# Patient Record
Sex: MALE | Race: WHITE | Employment: OTHER | URBAN - NONMETROPOLITAN AREA
[De-identification: names, ages, dates, MRNs, and addresses within clinical notes are randomized per-mention and may not be internally consistent; named-entity substitution may affect disease eponyms.]

---

## 2017-02-23 ENCOUNTER — OFFICE VISIT (OUTPATIENT)
Age: 82
End: 2017-02-23

## 2017-02-23 VITALS
RESPIRATION RATE: 20 BRPM | WEIGHT: 262 LBS | BODY MASS INDEX: 34.72 KG/M2 | OXYGEN SATURATION: 96 % | HEIGHT: 73 IN | SYSTOLIC BLOOD PRESSURE: 130 MMHG | HEART RATE: 84 BPM | DIASTOLIC BLOOD PRESSURE: 80 MMHG | TEMPERATURE: 97.8 F

## 2017-02-23 DIAGNOSIS — R19.05 PERIUMBILICAL MASS: Primary | ICD-10-CM

## 2017-02-23 PROCEDURE — 99214 OFFICE O/P EST MOD 30 MIN: CPT | Performed by: SURGERY

## 2017-02-23 RX ORDER — PANTOPRAZOLE SODIUM 40 MG/1
40 TABLET, DELAYED RELEASE ORAL DAILY
COMMUNITY

## 2017-02-23 ASSESSMENT — ENCOUNTER SYMPTOMS
ABDOMINAL PAIN: 1
RECTAL PAIN: 0
WHEEZING: 0
STRIDOR: 0
EYE DISCHARGE: 0
ABDOMINAL DISTENTION: 1
PHOTOPHOBIA: 0
CHOKING: 0
DIARRHEA: 0
TROUBLE SWALLOWING: 0
APNEA: 0
BLOOD IN STOOL: 0
FACIAL SWELLING: 0
SORE THROAT: 0
CHEST TIGHTNESS: 0
VOICE CHANGE: 0
COUGH: 0
NAUSEA: 0
RHINORRHEA: 0
COLOR CHANGE: 0
EYE ITCHING: 0
CONSTIPATION: 0
SINUS PRESSURE: 0
BACK PAIN: 0
EYE PAIN: 0
SHORTNESS OF BREATH: 0
VOMITING: 0
EYE REDNESS: 0
ANAL BLEEDING: 0

## 2021-07-07 ENCOUNTER — HOSPITAL ENCOUNTER (INPATIENT)
Age: 86
LOS: 6 days | Discharge: HOME HEALTH | DRG: 571 | End: 2021-07-13
Attending: EMERGENCY MEDICINE | Admitting: INTERNAL MEDICINE
Payer: MEDICARE

## 2021-07-07 DIAGNOSIS — L02.211 ABDOMINAL WALL ABSCESS: Primary | ICD-10-CM

## 2021-07-07 DIAGNOSIS — I10 ESSENTIAL HYPERTENSION: ICD-10-CM

## 2021-07-07 DIAGNOSIS — E87.1 HYPONATREMIA: ICD-10-CM

## 2021-07-07 LAB
ANION GAP SERPL CALCULATED.3IONS-SCNC: 13 MEQ/L (ref 8–16)
BASOPHILS # BLD: 0.5 %
BASOPHILS ABSOLUTE: 0.1 THOU/MM3 (ref 0–0.1)
BUN BLDV-MCNC: 11 MG/DL (ref 7–22)
CALCIUM SERPL-MCNC: 9.3 MG/DL (ref 8.5–10.5)
CHLORIDE BLD-SCNC: 89 MEQ/L (ref 98–111)
CO2: 22 MEQ/L (ref 23–33)
CREAT SERPL-MCNC: 0.8 MG/DL (ref 0.4–1.2)
CREATININE URINE: 19.2 MG/DL
EOSINOPHIL # BLD: 3.2 %
EOSINOPHILS ABSOLUTE: 0.4 THOU/MM3 (ref 0–0.4)
ERYTHROCYTE [DISTWIDTH] IN BLOOD BY AUTOMATED COUNT: 13.2 % (ref 11.5–14.5)
ERYTHROCYTE [DISTWIDTH] IN BLOOD BY AUTOMATED COUNT: 44 FL (ref 35–45)
GFR SERPL CREATININE-BSD FRML MDRD: > 90 ML/MIN/1.73M2
GLUCOSE BLD-MCNC: 103 MG/DL (ref 70–108)
HCT VFR BLD CALC: 30.3 % (ref 42–52)
HEMOGLOBIN: 10.4 GM/DL (ref 14–18)
IMMATURE GRANS (ABS): 0.09 THOU/MM3 (ref 0–0.07)
IMMATURE GRANULOCYTES: 0.7 %
LYMPHOCYTES # BLD: 4.3 %
LYMPHOCYTES ABSOLUTE: 0.5 THOU/MM3 (ref 1–4.8)
MCH RBC QN AUTO: 31.3 PG (ref 26–33)
MCHC RBC AUTO-ENTMCNC: 34.3 GM/DL (ref 32.2–35.5)
MCV RBC AUTO: 91.3 FL (ref 80–94)
MONOCYTES # BLD: 11 %
MONOCYTES ABSOLUTE: 1.4 THOU/MM3 (ref 0.4–1.3)
NUCLEATED RED BLOOD CELLS: 0 /100 WBC
OSMOLALITY: 266 MOSMOL/KG (ref 275–295)
PLATELET # BLD: 248 THOU/MM3 (ref 130–400)
PMV BLD AUTO: 10.7 FL (ref 9.4–12.4)
POTASSIUM REFLEX MAGNESIUM: 4.8 MEQ/L (ref 3.5–5.2)
PRO-BNP: 2378 PG/ML (ref 0–1800)
RBC # BLD: 3.32 MILL/MM3 (ref 4.7–6.1)
SEG NEUTROPHILS: 80.3 %
SEGMENTED NEUTROPHILS ABSOLUTE COUNT: 10.2 THOU/MM3 (ref 1.8–7.7)
SODIUM BLD-SCNC: 124 MEQ/L (ref 135–145)
SODIUM BLD-SCNC: 127 MEQ/L (ref 135–145)
SODIUM URINE: 31 MEQ/L
TSH SERPL DL<=0.05 MIU/L-ACNC: 1.54 UIU/ML (ref 0.4–4.2)
WBC # BLD: 12.7 THOU/MM3 (ref 4.8–10.8)

## 2021-07-07 PROCEDURE — 84300 ASSAY OF URINE SODIUM: CPT

## 2021-07-07 PROCEDURE — 85025 COMPLETE CBC W/AUTO DIFF WBC: CPT

## 2021-07-07 PROCEDURE — 87077 CULTURE AEROBIC IDENTIFY: CPT

## 2021-07-07 PROCEDURE — 87075 CULTR BACTERIA EXCEPT BLOOD: CPT

## 2021-07-07 PROCEDURE — 83880 ASSAY OF NATRIURETIC PEPTIDE: CPT

## 2021-07-07 PROCEDURE — 87186 SC STD MICRODIL/AGAR DIL: CPT

## 2021-07-07 PROCEDURE — 83930 ASSAY OF BLOOD OSMOLALITY: CPT

## 2021-07-07 PROCEDURE — 84295 ASSAY OF SERUM SODIUM: CPT

## 2021-07-07 PROCEDURE — 87205 SMEAR GRAM STAIN: CPT

## 2021-07-07 PROCEDURE — 1200000003 HC TELEMETRY R&B

## 2021-07-07 PROCEDURE — 10060 I&D ABSCESS SIMPLE/SINGLE: CPT

## 2021-07-07 PROCEDURE — 80048 BASIC METABOLIC PNL TOTAL CA: CPT

## 2021-07-07 PROCEDURE — 83935 ASSAY OF URINE OSMOLALITY: CPT

## 2021-07-07 PROCEDURE — 87070 CULTURE OTHR SPECIMN AEROBIC: CPT

## 2021-07-07 PROCEDURE — 99282 EMERGENCY DEPT VISIT SF MDM: CPT

## 2021-07-07 PROCEDURE — 36415 COLL VENOUS BLD VENIPUNCTURE: CPT

## 2021-07-07 PROCEDURE — 87147 CULTURE TYPE IMMUNOLOGIC: CPT

## 2021-07-07 PROCEDURE — 84443 ASSAY THYROID STIM HORMONE: CPT

## 2021-07-07 PROCEDURE — 82570 ASSAY OF URINE CREATININE: CPT

## 2021-07-07 PROCEDURE — 99203 OFFICE O/P NEW LOW 30 MIN: CPT | Performed by: EMERGENCY MEDICINE

## 2021-07-07 PROCEDURE — 0H97XZZ DRAINAGE OF ABDOMEN SKIN, EXTERNAL APPROACH: ICD-10-PCS | Performed by: STUDENT IN AN ORGANIZED HEALTH CARE EDUCATION/TRAINING PROGRAM

## 2021-07-07 PROCEDURE — 99205 OFFICE O/P NEW HI 60 MIN: CPT

## 2021-07-07 RX ORDER — LOSARTAN POTASSIUM 50 MG/1
50 TABLET ORAL NIGHTLY
Status: DISCONTINUED | OUTPATIENT
Start: 2021-07-07 | End: 2021-07-13 | Stop reason: HOSPADM

## 2021-07-07 RX ORDER — ONDANSETRON 2 MG/ML
4 INJECTION INTRAMUSCULAR; INTRAVENOUS EVERY 6 HOURS PRN
Status: DISCONTINUED | OUTPATIENT
Start: 2021-07-07 | End: 2021-07-13 | Stop reason: HOSPADM

## 2021-07-07 RX ORDER — DOCUSATE SODIUM 100 MG/1
100 CAPSULE, LIQUID FILLED ORAL DAILY
Status: DISCONTINUED | OUTPATIENT
Start: 2021-07-07 | End: 2021-07-09 | Stop reason: DRUGHIGH

## 2021-07-07 RX ORDER — DILTIAZEM HYDROCHLORIDE 120 MG/1
120 CAPSULE, COATED, EXTENDED RELEASE ORAL DAILY
Status: DISCONTINUED | OUTPATIENT
Start: 2021-07-08 | End: 2021-07-13 | Stop reason: HOSPADM

## 2021-07-07 RX ORDER — BUDESONIDE AND FORMOTEROL FUMARATE DIHYDRATE 160; 4.5 UG/1; UG/1
2 AEROSOL RESPIRATORY (INHALATION) 2 TIMES DAILY
Status: DISCONTINUED | OUTPATIENT
Start: 2021-07-07 | End: 2021-07-13 | Stop reason: HOSPADM

## 2021-07-07 RX ORDER — SODIUM CHLORIDE 9 MG/ML
1000 INJECTION, SOLUTION INTRAVENOUS CONTINUOUS
Status: DISCONTINUED | OUTPATIENT
Start: 2021-07-07 | End: 2021-07-07

## 2021-07-07 RX ORDER — ACETAMINOPHEN 325 MG/1
650 TABLET ORAL EVERY 4 HOURS PRN
Status: DISCONTINUED | OUTPATIENT
Start: 2021-07-07 | End: 2021-07-13 | Stop reason: HOSPADM

## 2021-07-07 RX ORDER — LATANOPROST 50 UG/ML
1 SOLUTION/ DROPS OPHTHALMIC NIGHTLY
Status: DISCONTINUED | OUTPATIENT
Start: 2021-07-07 | End: 2021-07-13 | Stop reason: HOSPADM

## 2021-07-07 RX ORDER — IPRATROPIUM BROMIDE AND ALBUTEROL SULFATE 2.5; .5 MG/3ML; MG/3ML
1 SOLUTION RESPIRATORY (INHALATION) EVERY 4 HOURS PRN
Status: DISCONTINUED | OUTPATIENT
Start: 2021-07-07 | End: 2021-07-13 | Stop reason: HOSPADM

## 2021-07-07 RX ORDER — DIGOXIN 250 MCG
125 TABLET ORAL DAILY
Status: DISCONTINUED | OUTPATIENT
Start: 2021-07-08 | End: 2021-07-13 | Stop reason: HOSPADM

## 2021-07-07 RX ORDER — ALBUTEROL SULFATE 2.5 MG/3ML
2.5 SOLUTION RESPIRATORY (INHALATION) EVERY 6 HOURS PRN
Status: DISCONTINUED | OUTPATIENT
Start: 2021-07-07 | End: 2021-07-13 | Stop reason: HOSPADM

## 2021-07-07 RX ORDER — CLONIDINE HYDROCHLORIDE 0.2 MG/1
0.2 TABLET ORAL 3 TIMES DAILY
Status: DISCONTINUED | OUTPATIENT
Start: 2021-07-07 | End: 2021-07-08

## 2021-07-07 RX ORDER — LIDOCAINE HYDROCHLORIDE AND EPINEPHRINE 10; 10 MG/ML; UG/ML
INJECTION, SOLUTION INFILTRATION; PERINEURAL
Status: DISPENSED
Start: 2021-07-07 | End: 2021-07-08

## 2021-07-07 RX ORDER — SODIUM CHLORIDE 9 MG/ML
INJECTION, SOLUTION INTRAVENOUS CONTINUOUS
Status: DISCONTINUED | OUTPATIENT
Start: 2021-07-07 | End: 2021-07-07

## 2021-07-07 ASSESSMENT — ENCOUNTER SYMPTOMS
EYE REDNESS: 0
SORE THROAT: 0
COLOR CHANGE: 1
VOICE CHANGE: 0
TROUBLE SWALLOWING: 0
COUGH: 0
EYE ITCHING: 0
PHOTOPHOBIA: 0
VOMITING: 0
DIARRHEA: 0
WHEEZING: 0
STRIDOR: 0
ABDOMINAL PAIN: 0
SINUS PRESSURE: 0
EYE DISCHARGE: 0
SHORTNESS OF BREATH: 0
BACK PAIN: 0
NAUSEA: 0
EYE PAIN: 0

## 2021-07-07 ASSESSMENT — VISUAL ACUITY: OU: 1

## 2021-07-07 ASSESSMENT — PAIN DESCRIPTION - LOCATION: LOCATION: ABDOMEN

## 2021-07-07 NOTE — ED NOTES
Pt up in bed with blankets over bottom half. Patient updated on plan of care at this time and expresses no concerns regarding treatment. Patient VSS. Respirations are regular and unlabored, patient is alert and oriented x4. Patient bed rails up x2 and call light within reach. Will continue to monitor.        Gale Gutierrez RN  07/07/21 3530

## 2021-07-07 NOTE — ED PROVIDER NOTES
[facility] ENCOUNTER          Pt Name: Jo Prescott  MRN: [id number]  [place]way[place] 1935  Date of evaluation: 7/7/2021  Treating Resident Physician: Mode Morton MD  Supervising Physician: Stephanie Blanca       Chief Complaint   Patient presents with    Abscess     left lower abd. Started as an infected hair or something.  Wound Infection     History obtained from the patient. HISTORY OF PRESENT ILLNESS    The history is provided by the patient. No  was used. Estelita Palmer is a 82 y.o. male who presents to the emergency department for evaluation of a wound in the left lower abdomen since one week ago that has progressively enlarged. He reports pain 3/10 in severity located around the wound, purulent drainage, swelling, and redness. He denies any other new rashes. Denies subjective fever, c/p, SOB, abd pain, N/V, night sweats. He denies history of prior Staph infection. He is taking Xarelto and reports that he may have bumped his abdomen on his walker recently prior to the abscess formation. The patient has no other acute complaints at this time. REVIEW OF SYSTEMS   Review of Systems   Constitutional: Negative for chills, diaphoresis and fever. HENT: Negative for sore throat. Eyes: Negative for photophobia, pain, itching and visual disturbance. Respiratory: Negative for cough and shortness of breath. Cardiovascular: Negative for chest pain and leg swelling. Gastrointestinal: Negative for nausea and vomiting. Genitourinary: Negative for decreased urine volume, difficulty urinating, dysuria, hematuria and urgency. Musculoskeletal: Negative for arthralgias and neck pain. Skin: Positive for rash and wound. Neurological: Negative for dizziness, seizures, facial asymmetry, light-headedness and headaches. Psychiatric/Behavioral: Negative for agitation and confusion.          PAST MEDICAL AND SURGICAL HISTORY     Past Medical History:   Diagnosis Date    Arthritis     Asthma     Atrial fibrillation ([facility] [address]     [facility]     COPD (chronic obstructive pulmonary disease) ([facility] [address]     Encephalopathy acute 11/20/2013    Hyperlipidemia     Hypertension     Muscular deconditioning 12/23/2013    Pneumonia      Past Surgical History:   Procedure Laterality Date    ABDOMEN SURGERY      EKG 12-LEAD  8/5/2015         FRACTURE SURGERY  unsure    HERNIA REPAIR  2013    [place]    OTHER SURGICAL HISTORY  10-30-13    abdominal exploration, closure of evisceration with retention sutures-Dr. Aide Self     OTHER SURGICAL HISTORY  10-25-13    Placement of gastrostomy tube-Dr. Aide Self          MEDICATIONS     Current Facility-Administered Medications:     lidocaine-EPINEPHrine 1 %-1:564577 injection, , , ,     Current Outpatient Medications:     pantoprazole (PROTONIX) 40 MG tablet, Take 40 mg by mouth daily, Disp: , Rfl:     SPIRIVA HANDIHALER 18 MCG inhalation capsule, INHALE THE CONTENTS OF ONE CAPSULE BY MOUTH ONCE DAILY AS DIRECTED, Disp: 30 capsule, Rfl: 5    docusate sodium (COLACE) 100 MG capsule, TAKE ONE (1) CAPSULE BY MOUTH TWICE DAILY, Disp: 60 capsule, Rfl: 5    XARELTO 20 MG TABS tablet, TAKE ONE (1) TABLET BY MOUTH EACH EVENING WITH MEAL, Disp: 30 tablet, Rfl: 5    albuterol sulfate HFA (PROAIR HFA) 108 (90 BASE) MCG/ACT inhaler, INHALE 2 PUFFS BY MOUTH EVERY 4 HOURS AS NEEDED, Disp: 1 Inhaler, Rfl: 5    LANOXIN 125 MCG tablet, Take 1 tablet by mouth daily, Disp: 30 tablet, Rfl: 5    losartan (COZAAR) 50 MG tablet, TAKE 1 TABLET BY MOUTH NIGHTLY, Disp: 90 tablet, Rfl: 11    traZODone (DESYREL) 150 MG tablet, TAKE (1) TABLET BY MOUTH NIGHTLY AS NEEDED FOR SLEEP, Disp: 30 tablet, Rfl: 5    fluticasone (FLONASE) 50 MCG/ACT nasal spray, INSTILL 1 SPRAY INTRANASALLY DAILY, Disp: 16 g, Rfl: 11    clotrimazole (LOTRIMIN) 1 % cream, APPLY TOPICALLY AS DIRECTED, Disp: 45 g, Rfl: 5    ciclopirox (LOPROX) 0.77 % cream, APPLY TOPICALLY 2 TIMES DAILY, Disp: 15 g, Rfl: 11    spironolactone (ALDACTONE) 25 MG tablet, TAKE 1 TABLET BY MOUTH ONCE DAILY, Disp: 30 tablet, Rfl: 5    cloNIDine (CATAPRES) 0.2 MG tablet, Take 1 tablet by mouth three times daily, Disp: 180 tablet, Rfl: 3    SYMBICORT 160-4.5 MCG/ACT AERO, INHALE 2 (TWO) PUFFS BY MOUTH TWICE DAILY, Disp: 10.2 g, Rfl: 5    ipratropium-albuterol (DUONEB) 0.5-2.5 (3) MG/3ML SOLN nebulizer solution, INHALE THE CONTENTS OF 1 VIAL VIA NEBULIZER EVERY 6 HOURS, Disp: 360 mL, Rfl: 2    diltiazem (CARDIZEM CD) 120 MG ER capsule, Take 1 capsule by mouth daily, Disp: 90 capsule, Rfl: 3    furosemide (LASIX) 20 MG tablet, Take 1 tablet by mouth daily, Disp: 90 tablet, Rfl: 3    losartan (COZAAR) 50 MG tablet, Take 1 tablet by mouth nightly, Disp: 90 tablet, Rfl: 3    metoprolol (LOPRESSOR) 25 MG tablet, Take 1 tablet by mouth every 12 hours, Disp: 180 tablet, Rfl: 3    FLUoxetine (PROZAC) 10 MG capsule, TAKE ONE (1) CAPSULE BY MOUTH ONCE DAILY, Disp: 30 capsule, Rfl: 11    Lift Chair MISC, by Does not apply route, Disp: 1 each, Rfl: 0    AMITIZA 24 MCG capsule, TAKE ONE (1) CAPSULE BY MOUTH ONCE DAILY WITH FOOD, Disp: 30 capsule, Rfl: 11    Multiple Vitamins-Minerals (CERTAVITE SENIOR/ANTIOXIDANT PO), Take by mouth, Disp: , Rfl:     polyvinyl alcohol (LIQUIFILM TEARS) 1.4 % ophthalmic solution, 1 drop as needed, Disp: , Rfl:     latanoprost (XALATAN) 0.005 % ophthalmic solution, 1 drop nightly, Disp: , Rfl:     magnesium hydroxide (MILK OF MAGNESIA CONCENTRATE) 2400 MG/10ML SUSP, Take 2,400 mg by mouth once as needed, Disp: , Rfl:     acetaminophen 650 MG TABS, Take 650 mg by mouth every 4 hours as needed, Disp: 120 tablet, Rfl: 3    albuterol (PROVENTIL) (2.5 MG/3ML) 0.083% nebulizer solution, Take 3 mLs by nebulization every 6 hours as needed for Wheezing, Disp: 1 Package, Rfl: 3    guaiFENesin (ROBITUSSIN) 100 MG/5ML syrup, Take 200 mg by mouth 4 times daily as needed for Cough or Congestion. , Disp: , Rfl:     Nutritional Supplements (ENSURE COMPLETE) LIQD, Take 1 Can by mouth daily. , Disp: , Rfl:       SOCIAL HISTORY     Social History     Social History Narrative    Not on file     Social History     Tobacco Use    Smoking status: Former Smoker     Packs/day: 2.00     Years: 30.00     Pack years: 60.00     Types: Cigarettes     Start date: 12/17/1978    Smokeless tobacco: Never Used   Substance Use Topics    Alcohol use: No     Comment: 1 time a week    Drug use: No         ALLERGIES     Allergies   Allergen Reactions    Metolazone      Causes hyponatremia (with high urine sodium)    Thiazide-Type Diuretics      Causes hyponatremia (with high urine sodium)         FAMILY HISTORY     Family History   Problem Relation Age of Onset    Arthritis Father     Asthma Father     Heart Disease Father     High Blood Pressure Father     Cancer Mother         ovarian cancer    Diabetes Mother     Cancer Sister     Heart Disease Sister     Diabetes Brother     Cancer Sister     Heart Disease Sister     Heart Disease Brother     Diabetes Brother     Heart Disease Brother          PREVIOUS RECORDS   Previous records reviewed. PHYSICAL EXAM     ED Triage Vitals [07/07/21 1200]   BP Temp Temp Source Pulse Resp SpO2 Height Weight   (!) 146/62 97.6 °F (36.4 °C) Temporal 69 16 97 % 6' 1\" (1.854 m) 230 lb (104.3 kg)     Initial vital signs and nursing assessment reviewed and normal. Body mass index is 30.34 kg/m². Pulsoximetry is normal per my interpretation. Additional Vital Signs:  Vitals:    07/07/21 1200   BP: (!) 146/62   Pulse: 69   Resp: 16   Temp: 97.6 °F (36.4 °C)   SpO2: 97%       Physical Exam  Exam conducted with a chaperone present. Constitutional:       General: He is awake. Appearance: Normal appearance. He is well-developed.  He is not ill-appearing, toxic-appearing or diaphoretic. HENT:      Head: Atraumatic. Eyes:      General: Lids are normal. Vision grossly intact. Gaze aligned appropriately. Extraocular Movements: Extraocular movements intact. Conjunctiva/sclera: Conjunctivae normal.      Pupils: Pupils are equal, round, and reactive to light. Neck:      Trachea: Trachea normal.   Cardiovascular:      Rate and Rhythm: Normal rate and regular rhythm. Heart sounds: Heart sounds are distant. No murmur heard. No friction rub. No gallop. Pulmonary:      Effort: Pulmonary effort is normal.      Breath sounds: Normal breath sounds and air entry. Chest:      Chest wall: No mass, lacerations, deformity, swelling, tenderness, crepitus or edema. Breasts: Breasts are symmetrical.     Abdominal:      General: Abdomen is flat. A surgical scar is present. There is no distension. Palpations: Abdomen is soft. Tenderness: There is no abdominal tenderness. There is no guarding or rebound. Genitourinary:     Penis: Normal.    Musculoskeletal:      Cervical back: Full passive range of motion without pain, normal range of motion and neck supple. Right lower leg: No edema. Left lower leg: No edema. Feet:      Right foot:      Skin integrity: Skin integrity normal.      Toenail Condition: Right toenails are normal.      Left foot:      Skin integrity: Skin integrity normal.      Toenail Condition: Left toenails are normal.   Skin:     General: Skin is warm and dry. Findings: Abscess and bruising present. Comments: Ecchymoses to b/l forearms   Neurological:      Mental Status: He is alert and oriented to person, place, and time. Mental status is at baseline. GCS: GCS eye subscore is 4. GCS verbal subscore is 5. GCS motor subscore is 6. Cranial Nerves: Cranial nerves are intact.    Psychiatric:         Attention and Perception: Attention and perception normal.         Mood and Affect: Mood and affect normal. Speech: Speech normal.         Behavior: Behavior normal. Behavior is cooperative. Thought Content: Thought content normal.         Incision/Drainage    Date/Time: 7/7/2021 2:48 PM  Performed by: Muriel Hodgkins, MD  Authorized by: Coleen Graham DO     Consent:     Consent obtained:  Verbal    Consent given by:  Patient    Risks discussed:  Bleeding and pain    Alternatives discussed:  No treatment  Location:     Type:  Abscess    Size:  4cm x 5cm    Location:  Trunk    Trunk location:  Abdomen  Pre-procedure details:     Skin preparation:  Antiseptic wash  Anesthesia (see MAR for exact dosages): Anesthesia method:  Local infiltration    Local anesthetic:  Lidocaine 1% WITH epi  Procedure type:     Complexity:  Complex  Procedure details:     Needle aspiration: no      Incision types:  Single straight    Incision depth:  Dermal    Wound management:  Irrigated with saline and probed and deloculated    Drainage:  Bloody and purulent    Drainage amount: Moderate    Packing materials:  1/2 in gauze  Post-procedure details:     Patient tolerance of procedure: Tolerated well, no immediate complications          MEDICAL DECISION MAKING   Initial Assessment:   1. Cutaneous abdominal abscess. Plan:    Afebrile   WBC 12.7   POCUS confirmed dx   I&D performed    2.  Hyponatremia  Plan  Stable, pt mentating appropriately  Na 124 today, last Na 133 in 2017  Home meds: Prozac, Lasix (probable cause of hyponatremia)  Will admit to Dr. Charlene Moya        ED RESULTS   Laboratory results:  Labs Reviewed   BASIC METABOLIC PANEL W/ REFLEX TO MG FOR LOW K - Abnormal; Notable for the following components:       Result Value    Sodium 124 (*)     Chloride 89 (*)     CO2 22 (*)     All other components within normal limits   CBC WITH AUTO DIFFERENTIAL - Abnormal; Notable for the following components:    WBC 12.7 (*)     RBC 3.32 (*)     Hemoglobin 10.4 (*)     Hematocrit 30.3 (*)     Segs Absolute 10.2 (*)     Lymphocytes Absolute 0.5 (*)     Monocytes Absolute 1.4 (*)     Immature Grans (Abs) 0.09 (*)     All other components within normal limits   CULTURE, ANAEROBIC AND AEROBIC   ANION GAP   GLOMERULAR FILTRATION RATE, ESTIMATED       Radiologic studies results:  No orders to display       ED Medications administered this visit:   Medications   lidocaine-EPINEPHrine 1 %-1:587597 injection (has no administration in time range)         ED COURSE     ED Course as of Jul 07 1610   Wed Jul 07, 2021   1528 BMP showed hyponatremia of 124. Will to admit to Dr. Arnaldo Sal. Discussed with Dr. Arnaldo Sal and he is in agreement. [JT]      ED Course User Index  [JT] Christiane Ireland MD             MEDICATION CHANGES     Current Discharge Medication List            FINAL DISPOSITION     Final diagnoses:   Abdominal wall abscess   Hyponatremia     Condition: condition: stable  Dispo: Admit to hospital      This transcription was electronically signed. Parts of this transcriptions may have been dictated by use of voice recognition software and electronically transcribed, and parts may have been transcribed with the assistance of an ED scribe. The transcription may contain errors not detected in proofreading. Please refer to my supervising physician's documentation if my documentation differs.     Electronically Signed: Christiane Ireland MD, 07/07/21, 4:10 PM          Christiane Ireland MD  Resident  07/07/21 1612       Christiane Ireland MD  Resident  07/07/21 1731       Christiane Ireland MD  Resident  07/07/21 2403

## 2021-07-07 NOTE — ED NOTES
To [facility] with complaints of wanting a wound check. States it started about a week ago as an ingrown hair. States it has gotten worse over the past week. States that it is draining and redness is spreading. Large area of purple/redness with drainage from area. With large area of redness around area. Is painful.       Jean Pierre Rollins RN  07/07/21 6375

## 2021-07-07 NOTE — ED NOTES
Dr. Grimaldo Converse and student at the bedside performing US of abscess. Patient VSS. Respirations are regular and unlabored, patient is alert and oriented x4. Patient bed rails up x2 and call light within reach. Will continue to monitor.        Carmina Marcus RN  07/07/21 6944

## 2021-07-07 NOTE — ED PROVIDER NOTES
[id number]       Chief Complaint   Patient presents with    Abscess     left lower abd. Started as an infected hair or something.  Wound Infection       Nurses Notes reviewed and I agree except as noted in the HPI. HISTORY OF PRESENT ILLNESS   Dahlia Prescott is a 82 y.o. male who presents with 1 week history of redness pain swelling and purulent drainage from abscess of left lower abdominal wall. He rates pain at 4-10 in severity. No fever, vomiting, chest pain, shortness of breath, dizziness, syncope. No history of diabetes or MRSA. History of CHF, COPD, atrial fib, hypertension. REVIEW OF SYSTEMS     Review of Systems   Constitutional: Negative for appetite change, chills, fatigue, fever and unexpected weight change. HENT: Negative for congestion, ear discharge, ear pain, sinus pressure, sneezing, sore throat, trouble swallowing and voice change. Eyes: Negative for pain, discharge and redness. Respiratory: Negative for cough, shortness of breath, wheezing and stridor. Cardiovascular: Negative for chest pain and leg swelling. Gastrointestinal: Negative for abdominal pain, diarrhea, nausea and vomiting. Genitourinary: Negative for dysuria, frequency, hematuria and urgency. Musculoskeletal: Negative for arthralgias, back pain, myalgias and neck pain. Skin: Positive for color change. Negative for rash. Redness pain and swelling left lower abdominal wall   Neurological: Negative for dizziness, syncope, weakness and headaches. Hematological: Negative for adenopathy. Psychiatric/Behavioral: Negative for behavioral problems, confusion, sleep disturbance and suicidal ideas. The patient is not nervous/anxious. All other systems reviewed and are negative.       PAST MEDICAL HISTORY         Diagnosis Date    Arthritis     Asthma     Atrial fibrillation (HCC)     CHF (congestive heart failure) (HCC)     COPD (chronic obstructive pulmonary disease) ([facility] [address]     Encephalopathy acute 11/20/2013    Hyperlipidemia     Hypertension     Muscular deconditioning 12/23/2013    Pneumonia        SURGICAL HISTORY     Patient  has a past surgical history that includes fracture surgery (unsure); hernia repair (2013); other surgical history (10-30-13); other surgical history (10-25-13); Abdomen surgery; and EKG 12 Lead (8/5/2015). CURRENT MEDICATIONS       Previous Medications    ACETAMINOPHEN 650 MG TABS    Take 650 mg by mouth every 4 hours as needed    ALBUTEROL (PROVENTIL) (2.5 MG/3ML) 0.083% NEBULIZER SOLUTION    Take 3 mLs by nebulization every 6 hours as needed for Wheezing    ALBUTEROL SULFATE HFA (PROAIR HFA) 108 (90 BASE) MCG/ACT INHALER    INHALE 2 PUFFS BY MOUTH EVERY 4 HOURS AS NEEDED    AMITIZA 24 MCG CAPSULE    TAKE ONE (1) CAPSULE BY MOUTH ONCE DAILY WITH FOOD    CICLOPIROX (LOPROX) 0.77 % CREAM    APPLY TOPICALLY 2 TIMES DAILY    CLONIDINE (CATAPRES) 0.2 MG TABLET    Take 1 tablet by mouth three times daily    CLOTRIMAZOLE (LOTRIMIN) 1 % CREAM    APPLY TOPICALLY AS DIRECTED    DILTIAZEM (CARDIZEM CD) 120 MG ER CAPSULE    Take 1 capsule by mouth daily    DOCUSATE SODIUM (COLACE) 100 MG CAPSULE    TAKE ONE (1) CAPSULE BY MOUTH TWICE DAILY    FLUOXETINE (PROZAC) 10 MG CAPSULE    TAKE ONE (1) CAPSULE BY MOUTH ONCE DAILY    FLUTICASONE (FLONASE) 50 MCG/ACT NASAL SPRAY    INSTILL 1 SPRAY INTRANASALLY DAILY    FUROSEMIDE (LASIX) 20 MG TABLET    Take 1 tablet by mouth daily    GUAIFENESIN (ROBITUSSIN) 100 MG/5ML SYRUP    Take 200 mg by mouth 4 times daily as needed for Cough or Congestion.     IPRATROPIUM-ALBUTEROL (DUONEB) 0.5-2.5 (3) MG/3ML SOLN NEBULIZER SOLUTION    INHALE THE CONTENTS OF 1 VIAL VIA NEBULIZER EVERY 6 HOURS    LANOXIN 125 MCG TABLET    Take 1 tablet by mouth daily    LATANOPROST (XALATAN) 0.005 % OPHTHALMIC SOLUTION    1 drop nightly    LIFT CHAIR MISC    by Does not apply route    LOSARTAN (COZAAR) 50 MG TABLET    Take 1 tablet by mouth nightly    LOSARTAN (COZAAR) 50 MG TABLET    TAKE 1 TABLET BY MOUTH NIGHTLY    MAGNESIUM HYDROXIDE (MILK OF MAGNESIA CONCENTRATE) 2400 MG/10ML SUSP    Take 2,400 mg by mouth once as needed    METOPROLOL (LOPRESSOR) 25 MG TABLET    Take 1 tablet by mouth every 12 hours    MULTIPLE VITAMINS-MINERALS (CERTAVITE SENIOR/ANTIOXIDANT PO)    Take by mouth    NUTRITIONAL SUPPLEMENTS (ENSURE COMPLETE) LIQD    Take 1 Can by mouth daily. PANTOPRAZOLE (PROTONIX) 40 MG TABLET    Take 40 mg by mouth daily    POLYVINYL ALCOHOL (LIQUIFILM TEARS) 1.4 % OPHTHALMIC SOLUTION    1 drop as needed    SPIRIVA HANDIHALER 18 MCG INHALATION CAPSULE    INHALE THE CONTENTS OF ONE CAPSULE BY MOUTH ONCE DAILY AS DIRECTED    SPIRONOLACTONE (ALDACTONE) 25 MG TABLET    TAKE 1 TABLET BY MOUTH ONCE DAILY    SYMBICORT 160-4.5 MCG/ACT AERO    INHALE 2 (TWO) PUFFS BY MOUTH TWICE DAILY    TRAZODONE (DESYREL) 150 MG TABLET    TAKE (1) TABLET BY MOUTH NIGHTLY AS NEEDED FOR SLEEP    XARELTO 20 MG TABS TABLET    TAKE ONE (1) TABLET BY MOUTH EACH EVENING WITH MEAL       ALLERGIES     Patient is is allergic to metolazone and thiazide-type diuretics. FAMILY HISTORY     Patient'sfamily history includes Arthritis in his father; Asthma in his father; Cancer in his mother, sister, and sister; Diabetes in his brother, brother, and mother; Heart Disease in his brother, brother, father, sister, and sister; High Blood Pressure in his father. SOCIAL HISTORY     Patient  reports that he has quit smoking. His smoking use included cigarettes. He started smoking about 42 years ago. He has a 60.00 pack-year smoking history. He has never used smokeless tobacco. He reports that he does not drink alcohol and does not use drugs. PHYSICAL EXAM     ED TRIAGE VITALS  BP: (!) 146/62, Temp: 97.6 °F (36.4 °C), Pulse: 69, Resp: 16, SpO2: 97 %  Physical Exam  Vitals and nursing note reviewed.    Constitutional:       Appearance: He is well-developed. Comments: Moist membranes, normal airway   HENT:      Head: Normocephalic and atraumatic. Right Ear: External ear normal.      Left Ear: External ear normal.      Nose: Nose normal.      Mouth/Throat:      Pharynx: No oropharyngeal exudate. Comments: Oropharynx normal  Eyes:      General: No scleral icterus. Right eye: No discharge. Left eye: No discharge. Extraocular Movements:      Right eye: Normal extraocular motion. Left eye: Normal extraocular motion. Conjunctiva/sclera: Conjunctivae normal.      Pupils: Pupils are equal, round, and reactive to light. Comments: Conjunctiva clear   Neck:      Thyroid: No thyromegaly. Vascular: No JVD. Comments: No meningismus  Cardiovascular:      Rate and Rhythm: Normal rate and regular rhythm. Pulses: Normal pulses. Heart sounds: Normal heart sounds, S1 normal and S2 normal. No murmur heard. No friction rub. No gallop. Pulmonary:      Effort: Pulmonary effort is normal. No tachypnea or respiratory distress. Breath sounds: Normal breath sounds. No stridor. No decreased breath sounds, wheezing, rhonchi or rales. Comments: Sounds equal  Chest:      Chest wall: No tenderness. Abdominal:      General: Bowel sounds are normal. There is no distension. Palpations: Abdomen is soft. There is no mass. Tenderness: There is no abdominal tenderness. There is no guarding or rebound. Comments: Extensive erythema, tenderness, swelling left lower abdominal wall with open ulcer draining purulent material   Musculoskeletal:         General: No tenderness. Normal range of motion. Cervical back: Normal range of motion. Right lower leg: Normal.      Left lower leg: Normal.   Lymphadenopathy:      Cervical: Cervical adenopathy present. Right cervical: Superficial cervical adenopathy present. No deep cervical adenopathy.      Left cervical: Superficial cervical adenopathy present. No deep cervical adenopathy. Skin:     General: Skin is warm and dry. Findings: No erythema or rash. Comments: Extensive cellulitis and abscess left lower abdominal wall   Neurological:      Mental Status: He is alert and oriented to person, place, and time. Cranial Nerves: No cranial nerve deficit. Motor: No abnormal muscle tone. Coordination: Coordination normal.      Deep Tendon Reflexes: Reflexes are normal and symmetric. Reflexes normal.      Comments: Appropriate, no focal finding   Psychiatric:         Behavior: Behavior normal.         Thought Content: Thought content normal.         Judgment: Judgment normal.         DIAGNOSTIC RESULTS   Labs: No results found for this visit on 07/07/21. IMAGING:  No orders to display     URGENT CARE COURSE:     Vitals:    07/07/21 1200   BP: (!) 146/62   Pulse: 69   Resp: 16   Temp: 97.6 °F (36.4 °C)   TempSrc: Temporal   SpO2: 97%   Weight: 230 lb (104.3 kg)   Height: 6' 1\" (1.854 m)       Medications - No data to display  PROCEDURES:  None  FINALIMPRESSION      1. Abdominal wall abscess    2. Abdominal wall cellulitis        DISPOSITION/PLAN   DISPOSITION Decision To Transfer 07/07/2021 12:21:21 PM  Patient transferred to [facility] ED per patient and caregiver request.  Patient stable for private vehicle transfer with caregiver to drive.   Patient accepted in transfer by Ginny Calzada [facility] ED charge nurse at [facility]:  to [facility] ED      to [facility] ED    DISCHARGE MEDICATIONS:  New Prescriptions    No medications on file     Current Discharge Medication List          MD Kiran Gonzalez MD  07/07/21 [id number]

## 2021-07-07 NOTE — ED NOTES
Patient lying in bed with blankets watching tv at this time. Patient respirations are regular and unlabored. Patient appears to be in no current distress. Patient VSS. Call light is within reach. Patient expresses no needs at this time.        Eloy Sanchez RN  07/07/21 8875

## 2021-07-07 NOTE — ED NOTES
Patient expresses concerns regarding admission at this time. Dr. Bhavna Smith notified of concerns. Patient VSS. Respirations are regular and unlabored, patient is alert and oriented x4. Patient bed rails up x2 and call light within reach. Will continue to monitor.      Jamar Rabago RN  07/07/21 4720

## 2021-07-07 NOTE — ACP (ADVANCE CARE PLANNING)
Advance Care Planning     Advance Care Planning Activator (Inpatient)  Conversation Note      Date of ACP Conversation: 7/7/2021     Conversation Conducted with: Patient with Decision Making Capacity    ACP Activator: Hector Wilson RN    Care Preferences    Ventilation: \"If you were in your present state of health and suddenly became very ill and were unable to breathe on your own, what would your preference be about the use of a ventilator (breathing machine) if it were available to you? \"      Would the patient desire the use of ventilator (breathing machine)?: no    \"If your health worsens and it becomes clear that your chance of recovery is unlikely, what would your preference be about the use of a ventilator (breathing machine) if it were available to you? \"     Would the patient desire the use of ventilator (breathing machine)?: No      Resuscitation  \"CPR works best to restart the heart when there is a sudden event, like a heart attack, in someone who is otherwise healthy. Unfortunately, CPR does not typically restart the heart for people who have serious health conditions or who are very sick. \"    \"In the event your heart stopped as a result of an underlying serious health condition, would you want attempts to be made to restart your heart (answer \"yes\" for attempt to resuscitate) or would you prefer a natural death (answer \"no\" for do not attempt to resuscitate)? \" no       [x] Yes   [] No   Educated Patient / Kiesha Calix regarding differences between Advance Directives and portable DNR orders.     Length of ACP Conversation in minutes:  25  Conversation Outcomes:  [x] ACP discussion completed  [] Existing advance directive reviewed with patient; no changes to patient's previously recorded wishes  [] New Advance Directive completed  [] Portable Do Not Rescitate prepared for Provider review and signature  [] POLST/POST/MOLST/MOST prepared for Provider review and signature      Follow-up plan:    [] Schedule follow-up conversation to continue planning  [] Referred individual to Provider for additional questions/concerns   [] Advised patient/agent/surrogate to review completed ACP document and update if needed with changes in condition, patient preferences or care setting    [x] This note routed to one or more involved healthcare providers     Britton Tan is in room with his son Pancho Carlos. [facility]. Pt also hyponatremic on this admission. Pt is from [facility], states his wife lives on the other side at [facility]. Pt has a Living Will on file from [address] [place] [address], with son Pancho Carlos as his first contact. I educated both Britton Tan and Pancho Carlos on the difference between a Living Will & an Advanced Directive. Britton Tan states he is fine with keeping his Living Will, & does not feel like he needs to fill out an AD. Pt did state that he is a [facility], and has the portable paper hanging on his apartment door. Son Pancho Carlos also confirms this. I called admitting physician Dr. Karyn Babcock, and received a telephone order for [facility] here in the hospital. Educated to bring copy back to hospital should he return so we file it in chart here also. No other needs identified.

## 2021-07-08 LAB
ANION GAP SERPL CALCULATED.3IONS-SCNC: 9 MEQ/L (ref 8–16)
BUN BLDV-MCNC: 10 MG/DL (ref 7–22)
CALCIUM SERPL-MCNC: 9.1 MG/DL (ref 8.5–10.5)
CHLORIDE BLD-SCNC: 93 MEQ/L (ref 98–111)
CO2: 26 MEQ/L (ref 23–33)
CREAT SERPL-MCNC: 0.8 MG/DL (ref 0.4–1.2)
ERYTHROCYTE [DISTWIDTH] IN BLOOD BY AUTOMATED COUNT: 13.2 % (ref 11.5–14.5)
ERYTHROCYTE [DISTWIDTH] IN BLOOD BY AUTOMATED COUNT: 44.1 FL (ref 35–45)
GFR SERPL CREATININE-BSD FRML MDRD: > 90 ML/MIN/1.73M2
GLUCOSE BLD-MCNC: 134 MG/DL (ref 70–108)
HCT VFR BLD CALC: 29.6 % (ref 42–52)
HEMOGLOBIN: 10.1 GM/DL (ref 14–18)
MCH RBC QN AUTO: 31.3 PG (ref 26–33)
MCHC RBC AUTO-ENTMCNC: 34.1 GM/DL (ref 32.2–35.5)
MCV RBC AUTO: 91.6 FL (ref 80–94)
OSMOLALITY URINE: 136 MOSMOL/KG (ref 250–750)
PLATELET # BLD: 236 THOU/MM3 (ref 130–400)
PMV BLD AUTO: 9.9 FL (ref 9.4–12.4)
POTASSIUM SERPL-SCNC: 4.4 MEQ/L (ref 3.5–5.2)
RBC # BLD: 3.23 MILL/MM3 (ref 4.7–6.1)
SODIUM BLD-SCNC: 128 MEQ/L (ref 135–145)
WBC # BLD: 11.3 THOU/MM3 (ref 4.8–10.8)

## 2021-07-08 PROCEDURE — 2580000003 HC RX 258: Performed by: INTERNAL MEDICINE

## 2021-07-08 PROCEDURE — 6360000002 HC RX W HCPCS: Performed by: INTERNAL MEDICINE

## 2021-07-08 PROCEDURE — 6370000000 HC RX 637 (ALT 250 FOR IP): Performed by: INTERNAL MEDICINE

## 2021-07-08 PROCEDURE — 85027 COMPLETE CBC AUTOMATED: CPT

## 2021-07-08 PROCEDURE — 80048 BASIC METABOLIC PNL TOTAL CA: CPT

## 2021-07-08 PROCEDURE — 36415 COLL VENOUS BLD VENIPUNCTURE: CPT

## 2021-07-08 PROCEDURE — 94640 AIRWAY INHALATION TREATMENT: CPT

## 2021-07-08 PROCEDURE — 1200000003 HC TELEMETRY R&B

## 2021-07-08 PROCEDURE — 94760 N-INVAS EAR/PLS OXIMETRY 1: CPT

## 2021-07-08 RX ORDER — TRIAMCINOLONE ACETONIDE 1 MG/G
1 CREAM TOPICAL EVERY 12 HOURS PRN
COMMUNITY

## 2021-07-08 RX ORDER — CANDESARTAN 16 MG/1
16 TABLET ORAL 2 TIMES DAILY
COMMUNITY

## 2021-07-08 RX ORDER — UBIDECARENONE 75 MG
1000 CAPSULE ORAL DAILY
COMMUNITY

## 2021-07-08 RX ORDER — POLYETHYLENE GLYCOL 3350 17 G/17G
17 POWDER, FOR SOLUTION ORAL DAILY PRN
Status: ON HOLD | COMMUNITY
End: 2022-03-08 | Stop reason: HOSPADM

## 2021-07-08 RX ORDER — CLONIDINE HYDROCHLORIDE 0.2 MG/1
0.2 TABLET ORAL 2 TIMES DAILY
Status: DISCONTINUED | OUTPATIENT
Start: 2021-07-08 | End: 2021-07-13 | Stop reason: HOSPADM

## 2021-07-08 RX ORDER — MULTIVIT-MIN/IRON/FOLIC ACID/K 18-600-40
2000 CAPSULE ORAL DAILY
COMMUNITY

## 2021-07-08 RX ORDER — PANTOPRAZOLE SODIUM 40 MG/1
40 TABLET, DELAYED RELEASE ORAL DAILY
Status: DISCONTINUED | OUTPATIENT
Start: 2021-07-08 | End: 2021-07-13 | Stop reason: HOSPADM

## 2021-07-08 RX ORDER — METOPROLOL TARTRATE 50 MG/1
50 TABLET, FILM COATED ORAL EVERY 12 HOURS
Status: DISCONTINUED | OUTPATIENT
Start: 2021-07-08 | End: 2021-07-13 | Stop reason: HOSPADM

## 2021-07-08 RX ORDER — DOCUSATE SODIUM 50 MG/5ML
50 LIQUID ORAL EVERY 8 HOURS PRN
Status: DISCONTINUED | OUTPATIENT
Start: 2021-07-08 | End: 2021-07-13 | Stop reason: HOSPADM

## 2021-07-08 RX ADMIN — ACETAMINOPHEN 650 MG: 325 TABLET ORAL at 18:50

## 2021-07-08 RX ADMIN — LATANOPROST 1 DROP: 50 SOLUTION OPHTHALMIC at 00:49

## 2021-07-08 RX ADMIN — BUDESONIDE AND FORMOTEROL FUMARATE DIHYDRATE 2 PUFF: 160; 4.5 AEROSOL RESPIRATORY (INHALATION) at 07:27

## 2021-07-08 RX ADMIN — DOCUSATE SODIUM 100 MG: 100 CAPSULE, LIQUID FILLED ORAL at 09:57

## 2021-07-08 RX ADMIN — CLONIDINE HYDROCHLORIDE 0.2 MG: 0.2 TABLET ORAL at 21:46

## 2021-07-08 RX ADMIN — DILTIAZEM HYDROCHLORIDE 120 MG: 120 CAPSULE, EXTENDED RELEASE ORAL at 00:49

## 2021-07-08 RX ADMIN — CEFTRIAXONE SODIUM 1000 MG: 1 INJECTION, POWDER, FOR SOLUTION INTRAMUSCULAR; INTRAVENOUS at 02:09

## 2021-07-08 RX ADMIN — PANTOPRAZOLE SODIUM 40 MG: 40 TABLET, DELAYED RELEASE ORAL at 21:46

## 2021-07-08 RX ADMIN — BUDESONIDE AND FORMOTEROL FUMARATE DIHYDRATE 2 PUFF: 160; 4.5 AEROSOL RESPIRATORY (INHALATION) at 17:37

## 2021-07-08 RX ADMIN — MAGNESIUM HYDROXIDE 30 ML: 2400 SUSPENSION ORAL at 21:48

## 2021-07-08 RX ADMIN — RIVAROXABAN 20 MG: 20 TABLET, FILM COATED ORAL at 18:50

## 2021-07-08 RX ADMIN — METOPROLOL TARTRATE 50 MG: 50 TABLET, FILM COATED ORAL at 21:46

## 2021-07-08 RX ADMIN — DOCUSATE SODIUM 100 MG: 100 CAPSULE, LIQUID FILLED ORAL at 00:52

## 2021-07-08 RX ADMIN — CLONIDINE HYDROCHLORIDE 0.2 MG: 0.2 TABLET ORAL at 09:57

## 2021-07-08 RX ADMIN — CLONIDINE HYDROCHLORIDE 0.2 MG: 0.2 TABLET ORAL at 00:49

## 2021-07-08 RX ADMIN — LOSARTAN POTASSIUM 50 MG: 50 TABLET, FILM COATED ORAL at 21:46

## 2021-07-08 RX ADMIN — LATANOPROST 1 DROP: 50 SOLUTION OPHTHALMIC at 21:42

## 2021-07-08 RX ADMIN — CLONIDINE HYDROCHLORIDE 0.2 MG: 0.2 TABLET ORAL at 14:04

## 2021-07-08 RX ADMIN — LOSARTAN POTASSIUM 50 MG: 50 TABLET, FILM COATED ORAL at 00:49

## 2021-07-08 RX ADMIN — DIGOXIN 125 MCG: 250 TABLET ORAL at 09:58

## 2021-07-08 RX ADMIN — RIVAROXABAN 20 MG: 20 TABLET, FILM COATED ORAL at 02:09

## 2021-07-08 RX ADMIN — CEFTRIAXONE SODIUM 1000 MG: 1 INJECTION, POWDER, FOR SOLUTION INTRAMUSCULAR; INTRAVENOUS at 18:50

## 2021-07-08 ASSESSMENT — PAIN SCALES - GENERAL
PAINLEVEL_OUTOF10: 0
PAINLEVEL_OUTOF10: 4

## 2021-07-08 NOTE — H&P
Internal Medicine  History and Physical    Patient:  Enmanuel Prescott  MRN: [id number]      History Obtained From:  patient  PCP: Cloton Mccrary MD    CHIEF COMPLAINT:  abd pain/ wound x 1 week    HISTORY OF PRESENT ILLNESS:   The patient is a 82 y.o. male who presents with with a painful swelling in the left upper abdomen. Lesion started as a small pimple about a week ago. Subsequently got worse. No associated fever or chills. With increasing abdominal pain he came to emergency room last night. ER evaluation showed abscess involving the left upper abdominal wall. I&D performed. Started on antibiotics. Was also found to be hyponatremic with sodium of 124. Denies dizziness. No nausea no vomiting. Past Medical History:        Diagnosis Date    Arthritis     Asthma     [facility]rial fibrillation ([facility] [address]     CHF (congestive heart failure) (HCC)     [facility]) [facility])     Encephalopathy acute 11/20/2013    Hyperlipidemia     Hypertension     Muscular deconditioning 12/23/2013    Pneumonia        Past Surgical History:        Procedure Laterality Date    ABDOMEN SURGERY      EKG 12-LEAD  8/5/2015         FRACTURE SURGERY  unsure    [facility]ERN[facility]PAIR  2013    [facility]    OTHER SURGICAL HISTORY  10-30-13    abdominal exploration, closure of evisceration with retention sutures-Dr. Trevor Ray     OTHER SURGICAL HISTORY  10-25-13    Placement of gastrostomy tube-Dr. Trevor Ray        Medications Prior to Admission:    Prior to Admission medications    Medication Sig Start Date End Date Taking?  Authorizing Provider   Marco A Tolentino 18 MCG inhalation capsule INHALE THE CONTENTS OF ONE CAPSULE BY MOUTH ONCE DAILY AS DIRECTED 12/15/16  Yes Antoinette Hodgkins, APRN - CNP   docusate sodium (COLACE) 100 MG capsule TAKE ONE (1) CAPSULE BY MOUTH TWICE DAILY  Patient taking differently: Take 50 mg by mouth every 8 hours as needed for Constipation  10/20/16  Yes [facility] Rosy Clrak MD   XARELTO 20 MG TABS tablet TAKE ONE (1) TABLET BY MOUTH EACH EVENING WITH MEAL 10/20/16  Yes Pb Wren MD   albuterol sulfate HFA (PROAIR HFA) 108 (90 BASE) MCG/ACT inhaler INHALE 2 PUFFS BY MOUTH EVERY 4 HOURS AS NEEDED 8/30/16  Yes TREVA Woodward CNP   LANOXIN 125 MCG tablet Take 1 tablet by mouth daily 8/30/16  Yes TREVA Woodward CNP   traZODone (DESYREL) 150 MG tablet TAKE (1) TABLET BY MOUTH NIGHTLY AS NEEDED FOR SLEEP  Patient taking differently: Take 150 mg by mouth nightly Indications: Depression  7/27/16  Yes Pb Wren MD   fluticasone Lisseth Fillers) 50 MCG/ACT nasal spray INSTILL 1 SPRAY INTRANASALLY DAILY  Patient taking differently: 2 times daily  7/26/16  Yes Pb Wren MD   clotrimazole (LOTRIMIN) 1 % cream APPLY TOPICALLY AS DIRECTED 7/20/16  Yes Pb Wren MD   spironolactone (ALDACTONE) 25 MG tablet TAKE 1 TABLET BY MOUTH ONCE DAILY 4/19/16  Yes Pb Wren MD   cloNIDine (CATAPRES) 0.2 MG tablet Take 1 tablet by mouth three times daily  Patient taking differently: Take 0.2 mg by mouth 2 times daily  4/19/16  Yes Pb Wren MD   SYMBICORT 160-4.5 MCG/ACT AERO INHALE 2 (TWO) PUFFS BY MOUTH TWICE DAILY 3/1/16  Yes Thom Montilla MD   diltiazem (CARDIZEM CD) 120 MG ER capsule Take 1 capsule by mouth daily 1/19/16  Yes Pb Wren MD   furosemide (LASIX) 20 MG tablet Take 1 tablet by mouth daily 1/19/16  Yes Pb Wren MD   metoprolol (LOPRESSOR) 25 MG tablet Take 1 tablet by mouth every 12 hours  Patient taking differently: Take 50 mg by mouth every 12 hours  1/19/16  Yes Pb Wren MD   FLUoxetine (PROZAC) 10 MG capsule TAKE ONE (1) CAPSULE BY MOUTH ONCE DAILY 1/18/16  Yes Pb Wren MD   AMITIZA 24 MCG capsule TAKE ONE (1) CAPSULE BY MOUTH ONCE DAILY WITH FOOD 1/7/16  Yes Queen Keas Fanta Barber MD   Multiple Vitamins-Minerals (CERTAVITE SENIOR/ANTIOXIDANT PO) Take by mouth   Yes Historical Provider, MD   polyvinyl alcohol (LIQUIFILM TEARS) 1.4 % ophthalmic solution 1 drop as needed   Yes Historical Provider, MD   latanoprost (XALATAN) 0.005 % ophthalmic solution 1 drop nightly   Yes Historical Provider, MD   magnesium hydroxide (MILK OF MAGNESIA CONCENTRATE) 2400 MG/10ML SUSP Take 2,400 mg by mouth once as needed   Yes Historical Provider, MD   acetaminophen 650 MG TABS Take 650 mg by mouth every 4 hours as needed 7/5/15  Yes Laura Pablo MD   albuterol (PROVENTIL) (2.5 MG/3ML) 0.083% nebulizer solution Take 3 mLs by nebulization every 6 hours as needed for Wheezing  Patient taking differently: Take 2.5 mg by nebulization 2 times daily  7/5/15  Yes Laura Pablo MD   guaiFENesin (ROBITUSSIN) 100 MG/5ML syrup Take 200 mg by mouth 4 times daily as needed for Cough or Congestion. Yes Historical Provider, MD   pantoprazole (PROTONIX) 40 MG tablet Take 40 mg by mouth daily    Historical Provider, MD   Lift Chair MISC by Does not apply route 1/11/16   Adan Monaco MD   Nutritional Supplements (ENSURE COMPLETE) LIQD Take 1 Can by mouth daily. Historical Provider, MD       Allergies:  Metolazone and Thiazide-type diuretics    Social History:   TOBACCO:   reports that he has quit smoking. His smoking use included cigarettes. He started smoking about 42 years ago. He has a 60.00 pack-year smoking history. He has never used smokeless tobacco.  ETOH:   reports no history of alcohol use.       Family History:       Problem Relation Age of Onset    Arthritis Father     Asthma Father     Heart Disease Father     High Blood Pressure Father     Cancer Mother         ovarian cancer    Diabetes Mother     Cancer Sister     Heart Disease Sister     Diabetes Brother     Cancer Sister     Heart Disease Sister     Heart Disease Brother     Diabetes Brother     Heart Disease Brother REVIEW OF SYSTEMS:  CONSTITUTIONAL:  positive for  fatigue and malaise  negative for  fevers and chills  EYES:  negative for  irritation, redness and icterus  HEENT:  negative for  sore mouth, sore throat and hoarseness  RESPIRATORY:  negative for  dry cough, dyspnea and wheezing  CARDIOVASCULAR:  negative for  chest pain, dyspnea, palpitations, orthopnea  GASTROINTESTINAL:  positive for abdominal pain  negative for nausea, vomiting, abdominal mass and abdominal distention  GENITOURINARY:  negative for frequency and dysuria  HEMATOLOGIC/LYMPHATIC:  negative for easy bruising, bleeding and lymphadenopathy  ENDOCRINE:  negative for heat intolerance and cold intolerance  MUSCULOSKELETAL:  negative for  myalgias and arthralgias  NEUROLOGICAL:  positive for weakness  negative for headaches, dizziness and seizures  BEHAVIOR/PSYCH:  negative for increased agitation and anxiety    Physical Exam:    Vitals: /77   Pulse 65   Temp 97.8 °F (36.6 °C) (Oral)   Resp 20   Ht 6' 1\" (1.854 m)   Wt 233 lb 7.5 oz (105.9 kg)   SpO2 96%   BMI 30.80 kg/m²   CONSTITUTIONAL:  awake, alert, cooperative, no apparent distress, and appears stated age  EYES:  extra-ocular muscles intact  ENT:  normocepalic, without obvious abnormality  NECK:  supple, symmetrical, trachea midline  BACK:  symmetric  LUNGS:  No increased work of breathing, good air exchange, clear to auscultation bilaterally, no crackles or wheezing  CARDIOVASCULAR:  normal S1 and S2 and no edema  ABDOMEN:  normal bowel sounds, soft, distended, tenderness noted in the left upper quadrant, wick to an ant abd wall abscess cavity and surrounding erythema  MUSCULOSKELETAL:  there is no redness, warmth, or swelling of the joints  NEUROLOGIC:  Awake, alert, oriented to name, place and time. Cranial nerves II-XII are grossly intact. Motor is 5 out of 5 bilaterally. Cerebellar finger to nose, heel to shin intact. Sensory is intact.   Babinski down going, Romberg negative, and gait is normal.  SKIN:  no swelling      CBC:   Recent Labs     07/07/21  1316 07/08/21  0937   WBC 12.7* 11.3*   HGB 10.4* 10.1*    236     BMP:    Recent Labs     07/07/21  1316 07/07/21 2022 07/08/21  0937   * 127* 128*   K 4.8  --  4.4   CL 89*  --  93*   CO2 22*  --  26   BUN 11  --  10   CREATININE 0.8  --  0.8   GLUCOSE 103  --  134*     --  07/08/21 1338    Specimen Source: Abdomen     Gram Stain Result Few segmented neutrophils observed. No epithelial cells observed. Moderate gram positive cocci in pairs and clusters. Organism Staphylococcus (coagulase positive)Abnormal     Aerobic Culture heavy growth       Ref. Range 7/7/2021 22:20   Creatinine, Urine Latest Units: mg/dl 19.2   Osmolality, Ur Latest Ref Range: 250 - 750 mosmol/kg 136 (L)   Sodium, Ur Latest Units: meq/l 31       Assessment and Plan   1. Abscess left abd wall s/p I/D  2. Cellulitis abd wall  3. Hyponatremia  4. HTN  5. A fib  6. COPD    Cont wound care dly  Cont IV rocephin  Hyponatremia w/up, hold prozac, restrict free water 1L per day  Resume other home meds  Am labs    Patient Active Problem List   Diagnosis Code    COPD (chronic obstructive pulmonary disease) with acute bronchitis ([facility]) J44.0, J20.9    Encephalopathy acute G93.40    Muscular deconditioning R29.898    Constipation, chronic K59.09    Dermatitis L30.9    COPD (chronic obstructive pulmonary disease) ([facility] [address] J44.9    COPD with exacerbation ([facility] [address] J44.1    Obesity (BMI 30-39. 9) E66.9    COPD with exacerbation ([facility]) J44.1    Acute bronchitis J20.9    Hypertension I10    Atrial fibrillation ([facility]) I48.91    SSS (sick sinus syndrome) ([facility]) I49.5    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[facility]) I49.5    Atrial fibrillation, chronic ([facility]) I48.20    S/P cardiac pacemaker procedure Z95.0    NSTEMI (non-ST elevated myocardial infarction) ([facility]) I21.4    Respiratory failure with hypercapnia ([facility]) J96.92    Pneumonia J18.9    [facility] I50.1    Respiratory distress R06.03    COPD exacerbation ([facility]) J44.1    CHF (congestive heart failure) ([facility]) F83.3    Systolic CHF, acute on chronic ([facility]) I50.23    Hyperglycemia R73.9    Hyponatremia E87.1       Abraham Banda MD, MD  Admitting Internist

## 2021-07-08 NOTE — CARE COORDINATION
7/8/21, 7:32 AM EDT  DISCHARGE PLANNING EVALUATION:    Anibal Prescott       Admitted: 7/7/2021/ [address] day: 1   Location: [facility]16/016-A Reason for admit: Hyponatremia [E87.1]   PMH:  has a past medical history of Arthritis, Asthma, Atrial fibrillation ([facility] [address] CHF (congestive heart failure) ([facility] [address] COPD (chronic obstructive pulmonary disease) ([facility] [address] Encephalopathy acute, Hyperlipidemia, Hypertension, Muscular deconditioning, and Pneumonia. Procedure:7/7 I&D in ED   Barriers to Discharge:  Presented to ED for eval of abdominal wound. Drainage, swelling, redness. Reports bumping abdomen on walker prior. Internal med following. Na+ 124 upon arrival, currently 127. BNP 2,378. IV rocephin. Nebs. Inhaler. Wound culture pending. PCP: Mesha Isbell MD  Readmission Risk Score: 11%    Patient Goals/Plan/Treatment Preferences: [facility] SW consulted. Transportation/Food Security/Housekeeping Addressed:  No issues identified.

## 2021-07-08 NOTE — FLOWSHEET NOTE
8769   Call placed to Dr Berta Akers for diet and activity order    Received order for regular diet and patient can be up with assistance

## 2021-07-08 NOTE — CARE COORDINATION
DISCHARGE/PLANNING EVALUATION  7/8/21, 11:50 AM EDT    Reason for Referral:  From assisted living   Mental Status:  Alert, oriented   Decision Making:  Makes own decisions   Family/Social/Home Environment:  Vonna Gosselin lives in assisted living at [facility]. He has a medicaid waiver for his assisted living costs. He plans to return to assisted living at discharge. He has family support, including his son. Current Services including food security, transportation and housekeeping:  Assisted living assists with meals, housekeeping, can arrange transportation   Current Equipment:   Payment Source: [employer], medicaid   Concerns or Barriers to Discharge:  Plans return to assisted living  Post acute provider list with quality measures, geographic area and applicable managed care information provided. Questions regarding selection process answered: declined     Teach Back Method used with patient  regarding care plan and discharge plan  Patient  verbalizes understanding of the plan of care and contributes to goal setting. Patient goals, treatment preferences and discharge plan:  Spoke with Vonna Gosselin about discharge plan. He plans to return to assisted living at [facility] when ready for discharge.       Electronically signed by JESICA Arrieta on 7/8/2021 at 11:50 AM

## 2021-07-08 NOTE — PROGRESS NOTES
2130    Patient arrived from ED    Awake and alert    Denies pain  Nausea  Shortness of breath at               This time     Patients son in to visit    2145    Oriented to call light and surroundings     Patient asking for snack will have to Dr Arnaldo Sal as thers              Is no diet ordered

## 2021-07-09 LAB
ANION GAP SERPL CALCULATED.3IONS-SCNC: 12 MEQ/L (ref 8–16)
BUN BLDV-MCNC: 10 MG/DL (ref 7–22)
CALCIUM SERPL-MCNC: 9.6 MG/DL (ref 8.5–10.5)
CHLORIDE BLD-SCNC: 90 MEQ/L (ref 98–111)
CO2: 27 MEQ/L (ref 23–33)
CREAT SERPL-MCNC: 0.8 MG/DL (ref 0.4–1.2)
ERYTHROCYTE [DISTWIDTH] IN BLOOD BY AUTOMATED COUNT: 13.2 % (ref 11.5–14.5)
ERYTHROCYTE [DISTWIDTH] IN BLOOD BY AUTOMATED COUNT: 44 FL (ref 35–45)
GFR SERPL CREATININE-BSD FRML MDRD: > 90 ML/MIN/1.73M2
GLUCOSE BLD-MCNC: 120 MG/DL (ref 70–108)
HCT VFR BLD CALC: 32 % (ref 42–52)
HEMOGLOBIN: 10.7 GM/DL (ref 14–18)
MCH RBC QN AUTO: 30.9 PG (ref 26–33)
MCHC RBC AUTO-ENTMCNC: 33.4 GM/DL (ref 32.2–35.5)
MCV RBC AUTO: 92.5 FL (ref 80–94)
PLATELET # BLD: 270 THOU/MM3 (ref 130–400)
PMV BLD AUTO: 10.1 FL (ref 9.4–12.4)
POTASSIUM SERPL-SCNC: 4.5 MEQ/L (ref 3.5–5.2)
RBC # BLD: 3.46 MILL/MM3 (ref 4.7–6.1)
SODIUM BLD-SCNC: 129 MEQ/L (ref 135–145)
WBC # BLD: 11.8 THOU/MM3 (ref 4.8–10.8)

## 2021-07-09 PROCEDURE — 94640 AIRWAY INHALATION TREATMENT: CPT

## 2021-07-09 PROCEDURE — 80048 BASIC METABOLIC PNL TOTAL CA: CPT

## 2021-07-09 PROCEDURE — 36415 COLL VENOUS BLD VENIPUNCTURE: CPT

## 2021-07-09 PROCEDURE — 94760 N-INVAS EAR/PLS OXIMETRY 1: CPT

## 2021-07-09 PROCEDURE — 2500000003 HC RX 250 WO HCPCS: Performed by: INTERNAL MEDICINE

## 2021-07-09 PROCEDURE — 1200000003 HC TELEMETRY R&B

## 2021-07-09 PROCEDURE — 6370000000 HC RX 637 (ALT 250 FOR IP): Performed by: INTERNAL MEDICINE

## 2021-07-09 PROCEDURE — 85027 COMPLETE CBC AUTOMATED: CPT

## 2021-07-09 PROCEDURE — 99221 1ST HOSP IP/OBS SF/LOW 40: CPT | Performed by: SURGERY

## 2021-07-09 RX ORDER — BISACODYL 10 MG
10 SUPPOSITORY, RECTAL RECTAL DAILY PRN
Status: DISCONTINUED | OUTPATIENT
Start: 2021-07-09 | End: 2021-07-13 | Stop reason: HOSPADM

## 2021-07-09 RX ORDER — CLINDAMYCIN PHOSPHATE 600 MG/50ML
600 INJECTION INTRAVENOUS EVERY 8 HOURS
Status: DISCONTINUED | OUTPATIENT
Start: 2021-07-09 | End: 2021-07-13 | Stop reason: HOSPADM

## 2021-07-09 RX ORDER — DOCUSATE SODIUM 100 MG/1
100 CAPSULE, LIQUID FILLED ORAL 2 TIMES DAILY
Status: DISCONTINUED | OUTPATIENT
Start: 2021-07-09 | End: 2021-07-13 | Stop reason: HOSPADM

## 2021-07-09 RX ADMIN — DOCUSATE SODIUM 100 MG: 100 CAPSULE, LIQUID FILLED ORAL at 21:09

## 2021-07-09 RX ADMIN — CLINDAMYCIN PHOSPHATE 600 MG: 600 INJECTION, SOLUTION INTRAVENOUS at 17:51

## 2021-07-09 RX ADMIN — CLONIDINE HYDROCHLORIDE 0.2 MG: 0.2 TABLET ORAL at 08:11

## 2021-07-09 RX ADMIN — BUDESONIDE AND FORMOTEROL FUMARATE DIHYDRATE 2 PUFF: 160; 4.5 AEROSOL RESPIRATORY (INHALATION) at 17:31

## 2021-07-09 RX ADMIN — DILTIAZEM HYDROCHLORIDE 120 MG: 120 CAPSULE, EXTENDED RELEASE ORAL at 08:11

## 2021-07-09 RX ADMIN — LOSARTAN POTASSIUM 50 MG: 50 TABLET, FILM COATED ORAL at 21:09

## 2021-07-09 RX ADMIN — BISACODYL 10 MG: 10 SUPPOSITORY RECTAL at 15:41

## 2021-07-09 RX ADMIN — ACETAMINOPHEN 650 MG: 325 TABLET ORAL at 10:45

## 2021-07-09 RX ADMIN — METOPROLOL TARTRATE 50 MG: 50 TABLET, FILM COATED ORAL at 17:04

## 2021-07-09 RX ADMIN — METOPROLOL TARTRATE 50 MG: 50 TABLET, FILM COATED ORAL at 05:55

## 2021-07-09 RX ADMIN — PANTOPRAZOLE SODIUM 40 MG: 40 TABLET, DELAYED RELEASE ORAL at 08:14

## 2021-07-09 RX ADMIN — CLONIDINE HYDROCHLORIDE 0.2 MG: 0.2 TABLET ORAL at 21:09

## 2021-07-09 RX ADMIN — DOCUSATE SODIUM 100 MG: 100 CAPSULE, LIQUID FILLED ORAL at 08:14

## 2021-07-09 RX ADMIN — CLINDAMYCIN PHOSPHATE 600 MG: 600 INJECTION, SOLUTION INTRAVENOUS at 11:19

## 2021-07-09 RX ADMIN — MAGNESIUM HYDROXIDE 30 ML: 2400 SUSPENSION ORAL at 11:26

## 2021-07-09 RX ADMIN — LATANOPROST 1 DROP: 50 SOLUTION OPHTHALMIC at 21:10

## 2021-07-09 RX ADMIN — ACETAMINOPHEN 650 MG: 325 TABLET ORAL at 14:46

## 2021-07-09 RX ADMIN — BUDESONIDE AND FORMOTEROL FUMARATE DIHYDRATE 2 PUFF: 160; 4.5 AEROSOL RESPIRATORY (INHALATION) at 07:54

## 2021-07-09 RX ADMIN — DIGOXIN 125 MCG: 250 TABLET ORAL at 08:12

## 2021-07-09 ASSESSMENT — PAIN DESCRIPTION - LOCATION
LOCATION: ABDOMEN

## 2021-07-09 ASSESSMENT — PAIN SCALES - GENERAL
PAINLEVEL_OUTOF10: 4
PAINLEVEL_OUTOF10: 6
PAINLEVEL_OUTOF10: 0
PAINLEVEL_OUTOF10: 6
PAINLEVEL_OUTOF10: 3
PAINLEVEL_OUTOF10: 3

## 2021-07-09 NOTE — PROGRESS NOTES
INTERNAL MEDICINE Progress Note  7/9/2021 1:18 PM  Subjective:   Admit Date: 7/7/2021  PCP: Fabrizio Pina MD  Interval History: seen,   drainage from abd wound+, pain is better    Objective:   Vitals: BP (!) 115/57   Pulse 65   Temp 97.9 °F (36.6 °C) (Oral)   Resp 18   Ht 6' 1\" (1.854 m)   Wt 233 lb 7.5 oz (105.9 kg)   SpO2 97%   BMI 30.80 kg/m²   General appearance: alert and cooperative with exam  HEENT: Head: atraumatic  Neck: no adenopathy, no carotid bruit and no JVD  Lungs: clear to auscultation bilaterally  Heart: S1, S2 normal  Abdomen: normal findings: bowel sounds normal and symmetric and abnormal findings:  distended and left sided abscess-deroofed with wick  Extremities: no edema, redness or tenderness in the calves or thighs  Neurologic: Mental status: Alert, oriented, thought content appropriate      Medications:   Scheduled Meds:   clindamycin (CLEOCIN) IV  600 mg Intravenous Q8H    docusate sodium  100 mg Oral BID    cloNIDine  0.2 mg Oral BID    metoprolol tartrate  50 mg Oral Q12H    pantoprazole  40 mg Oral Daily    dilTIAZem  120 mg Oral Daily    latanoprost  1 drop Both Eyes Nightly    digoxin  125 mcg Oral Daily    losartan  50 mg Oral Nightly    budesonide-formoterol  2 puff Inhalation BID    rivaroxaban  20 mg Oral Daily     Continuous Infusions:    Lab Results:   CBC:   Recent Labs     07/07/21  1316 07/08/21  0937 07/09/21  0823   WBC 12.7* 11.3* 11.8*   HGB 10.4* 10.1* 10.7*    236 270     BMP:    Recent Labs     07/07/21  1316 07/07/21  1316 07/07/21 2022 07/08/21  0937 07/09/21  0823   *   < > 127* 128* 129*   K 4.8  --   --  4.4 4.5   CL 89*  --   --  93* 90*   CO2 22*  --   --  26 27   BUN 11  --   --  10 10   CREATININE 0.8  --   --  0.8 0.8   GLUCOSE 103  --   --  134* 120*    < > = values in this interval not displayed.      Magnesium:    Lab Results   Component Value Date    MG 1.9 11/13/2016     TSH:    Lab Results   Component Value Date    TSH 1.540 07/07/2021     Organism Abnormal  07/07/2021  4:30  [facility]   Staphylococcus aureus    Aerobic Culture 07/07/2021  4:30  [facility]   heavy growth This is a MRSA (Methicillin Resistant Staphylococcus aureus)isolate. Isolates of MRSA (ORSA) Methicillin (Oxacillin) Resistant Staphylococcus aureus (coagulase positive) require patient be placed in CONTACT isolation. Methicillin(Oxacillin)resistant strains of staphylococci (MRSA)or(MRSE)should be considered resistant to all classes of cephalosporins, penems and beta-lactams. In the treatment of gram positive infections, GENTAMICIN should be CONSIDERED a SYNERGYSTIC agent ONLY. Performed by: [address] [facility]  Source: abdomen       Site: swab left lower          Current Antibiotics: not stated   Susceptibility  Staphylococcus aureus (1)  Antibiotic Interpretation ROSA Status    gentamicin Sensitive <=0.5 mcg/mL Final    ciprofloxacin Resistant >=8 mcg/mL Final    oxacillin Resistant >=4 mcg/mL Final    ICR (D test) Negative Neg  mcg/mL Final     (Dtest) ICR- inducible clinda resistance    If +, then inducible erm gene       present. Clindamycin may be       effective in some patients.       trimethoprim-sulfamethoxazole Sensitive <=10 mcg/mL Final    clindamycin Sensitive <=0.25 mcg/mL Final    tetracycline Sensitive <=1 mcg/mL Final            Assessment and Plan:   1. Abscess left abd wall s/p I/D  2. Cellulitis abd wall  3. Hyponatremia  4. HTN  5. A fib  6.  COPD     Cont Gen surgery for exc debridement  consult ID service for antibiotic / wound care  Cont wound care dly  Cont IV clinda  Hyponatremia better, hold prozac, restrict free water 1L per day  Am labs    Fabrizio Pina MD, MD

## 2021-07-09 NOTE — PLAN OF CARE
Problem: Falls - Risk of:  Goal: Will remain free from falls  Description: Will remain free from falls  Outcome: Ongoing    No noted falls. Problem: DISCHARGE BARRIERS  Goal: Patient's continuum of care needs are met  Outcome: Ongoing    From [address] Renae WOODWARD Med list received today and med reconciliation complete. Dressing change to abdominal abscess with Dr. Esmer Collins.

## 2021-07-09 NOTE — CARE COORDINATION
7/9/21, 12:23 PM EDT    [address] day: 2  Location: [facility]16/016 Reason for admit: Hyponatremia [E87.1]   Procedure:   7/7 I&D in ED  Barriers to Discharge: Internal med following. Wound care. General surgery consult. Na+ 129. WBC 11.8. IV cleocin.    PCP: Monserrat Grove MD  Readmission Risk Score: 13%  Patient Goals/Plan/Treatment Preferences: From [facility]

## 2021-07-09 NOTE — PLAN OF CARE
Pt remains free from falls and reported injuries so far this shift. A&O X4. Utilizes call light to relay needs to staff as instructed. Call light in reach. Bed alarm activated.

## 2021-07-09 NOTE — DISCHARGE INSTR - COC
Continuity of Care Form    Patient Name: Trae Cordero   :  1935  MRN:  [id number]    Admit date:  2021  Discharge date:  2021    Code Status Order: DNR-CC   Advance Directives:     Admitting Physician:  Abraham Banda MD  PCP: Abraham Banda MD    Discharging Nurse: Jose Cruz RN  [address] Unit/Room#: 7K-16/016-A  Discharging Unit Phone Number: [phone]    Emergency Contact:   Extended Emergency Contact Information  Primary Emergency Contact: Juan Prescott   [address] Phone: [phone]  Relation: Child    Past Surgical History:  Past Surgical History:   Procedure Laterality Date    ABDOMEN SURGERY      EKG 12-LEAD  2015         FRACTURE SURGERY  unsure    [facility]    OTHER SURGICAL HISTORY  10-30-13    abdominal exploration, closure of evisceration with retention sutures-Dr. Nick Chopra     OTHER SURGICAL HISTORY  10-25-13    Placement of gastrostomy tube-Dr. Nick Chorpa        Immunization History:   Immunization History   Administered Date(s) Administered    Influenza Virus Vaccine 10/10/2013, 2014, 10/14/2015    Pneumococcal Conjugate 13-valent Afia Mink) 10/14/2015       Active Problems:  Patient Active Problem List   Diagnosis Code    COPD (chronic obstructive pulmonary disease) [facility] J44.0, J20.9    Encephalopathy acute G93.40    Muscular deconditioning R29.898    Constipation, chronic K59.09    Dermatitis L30.9    COPD (chronic obstructive pulmonary disease) [facility] J44.9    COPD with exacerbation ([facility] J44.1    Obesity (BMI 30-39. 9) E66.9    COPD with exacerbation ([facility]) J44.1    Acute bronchitis J20.9    Hypertension I10    Atrial fibrillation ([facility]) I48.91    SSS (sick sinus syndrome) ([facility]) I49.5    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[facility]) I49.5    Atrial fibrillation, chronic ([facility]) I48.20    S/P cardiac pacemaker procedure Z95.0    NSTEMI (non-ST elevated myocardial infarction) ([facility]) I21.4  Respiratory failure with hypercapnia ([facility]) J96.92    Pneumonia J18.9    Pulmonary edema cardiac cause ([facility]) I50.1    Respiratory distress R06.03    COPD exacerbation ([facility]) J44.1    CHF (congestive heart failure) ([facility]) N86.6    Systolic CHF, acute on chronic ([facility]) I50.23    Hyperglycemia R73.9    Hyponatremia E87.1       Isolation/Infection:   Isolation          Contact  Contact        Patient Infection Status     Infection Onset Added Last Indicated Last Indicated By Review Planned Expiration Resolved Resolved By    MRSA  10/31/13 07/07/21 Culture, Anaerobic and Aerobic        Abdomen 7/2021          Nurse Assessment:  Last Vital Signs: BP (!) 140/80   Pulse 78   Temp 98.1 °F (36.7 °C) (Oral)   Resp 16   Ht 6' 1\" (1.854 m)   Wt 233 lb 7.5 oz (105.9 kg)   SpO2 99%   BMI 30.80 kg/m²     Last documented pain score (0-10 scale): Pain Level: 3  Last Weight:   Wt Readings from Last 1 Encounters:   07/07/21 233 lb 7.5 oz (105.9 kg)     Mental Status:  stable    IV Access:  none    Nursing Mobility/ADLs:  Walking   stable  Transfer  sba assist  Bathing  some assistance  Dressing  some assistance  Toileting  some assistance  Feeding  independent  Med Admin  independent  Med Delivery  independenyt    Wound Care Documentation and Therapy:        Elimination:  Continence:   · Bowel: {YES   · Bladder: {YES   Urinary Catheter:   Colostomy/Ileostomy/Ileal Conduit       Date of Last BM: 7/12/2021    Intake/Output Summary (Last 24 hours) at 7/9/2021 1815  Last data filed at 7/9/2021 1710  Gross per 24 hour   Intake 1420 ml   Output 2300 ml   Net -880 ml     I/O last 3 completed shifts:   In: 900 [P.O.:900]  Out: 2300 [Urine:2300]    Safety Concerns:     stable    Impairments/Disabilities:          Nutrition Therapy:  Current Nutrition Therapy:   General diet     Routes of Feeding: oral  Liquids: {Slp liquid thickness:38165}  Daily Fluid Restriction: none  Last Modified Barium Swallow with Video (Video Swallowing Test):     Treatments at the Time of Hospital Discharge:   Respiratory Treatments: yes  Oxygen Therapy:  none  Ventilator:        Rehab Therapies:   Weight Bearing Status/Restrictions: none  Other Medical Equipment (for information only, NOT a DME order): Other Treatments: ***    Patient's personal belongings (please select all that are sent with patient):  Glasses, pants shirt socks footwear undergarments    RN SIGNATURE:  Electronically signed by Lucie Montes De Oca RN on 7/13/2021 at 12:24 PM-sign  CASE MANAGEMENT/SOCIAL WORK SECTION    Inpatient Status Date: ***    Readmission Risk Assessment Score:  Readmission Risk              Risk of Unplanned Readmission:  13           Discharging to Facility/ Agency   · Name:   · Address:  · Phone:  · Fax:    Dialysis Facility (if applicable)   · Name:  · Address:  · Dialysis Schedule:  · Phone:  · Fax:    / signature: {Esignature:[id number]}    PHYSICIAN SECTION    Prognosis: {Prognosis:5705804605}    Condition at Discharge: [address] [place] [address] Patient Condition:504264968}    Rehab Potential (if transferring to Rehab): {Prognosis:8013033870}    Recommended Labs or Other Treatments After Discharge: ***    Physician Certification: I certify the above information and transfer of Dany Dubose  is necessary for the continuing treatment of the diagnosis listed and that he requires {Admit to Appropriate Level of Care:88939} for {GREATER/LESS:735236479} 30 days.      Update Admission H&P: {CHP DME Changes in ZMBAS:049142556}    PHYSICIAN SIGNATURE:  {Esignature:[id number]}

## 2021-07-09 NOTE — CONSULTS
CONSULTATION NOTE :ID       Patient - Orquidea Montalvo,  Age - 82 y.o.    - 1935      Room Number - 7K-16/016-A   N -  [id number]   [facility]  Date of Admission -  2021 11:58 AM  Patient's PCP: Angelo Rhodes MD     Requesting Physician: Angelo Rhodes MD    REASON FOR CONSULTATION   Abdominal wall abscess  CHIEF COMPLAINT   Redness and swelling on his left lower abdomen of 4 days duration. HISTORY OF PRESENT ILLNESS       This is a very pleasant 82 y.o. male who was admitted to the hospital with a chief complaints of pain and swelling on his left lower abdomen. It started as a boil 4 days ago. It started to get bigger for which reason he was brought to the hospital.  He had incision and drainage of the wound. The culture is growing MRSA. He has been started on IV antibiotics. Has history of COPD congestive heart failure history of sick sinus syndrome requiring pacemaker placement. Currently getting IV clindamycin.   He has history of atrial fibrillation for which he is on Xarelto    PAST MEDICAL  HISTORY       Past Medical History:   Diagnosis Date    Arthritis     Asthma     Atrial fibrillation ([address]     [facility]est[facility]     COPD (chronic obstructive [facility] disease) [facility]     Encephalopathy acute 2013    Hyperlipidemia     Hypertension     Muscular deconditioning 2013    Pneumonia        PAST SURGICAL HISTORY     Past Surgical History:   Procedure Laterality Date    ABDOMEN SURGERY      EKG 12-LEAD  2015         FRACTURE SURGERY  unsure    [facility]    OTHER SURGICAL HISTORY  10-30-13    abdominal exploration, closure of evisceration with retention sutures-Dr. Aide Self     OTHER SURGICAL HISTORY  10-25-13    Placement of gastrostomy tube-Dr. Aide Self          MEDICATIONS:       Scheduled Meds:   clindamycin (CLEOCIN) IV  600 mg Intravenous Q8H    docusate sodium  100 mg Oral BID    cloNIDine  0.2 mg Oral BID    metoprolol tartrate  50 mg Oral Q12H    pantoprazole  40 mg Oral Daily    dilTIAZem  120 mg Oral Daily    latanoprost  1 drop Both Eyes Nightly    digoxin  125 mcg Oral Daily    losartan  50 mg Oral Nightly    budesonide-formoterol  2 puff Inhalation BID    [Held by provider] rivaroxaban  20 mg Oral Daily     Continuous Infusions:  PRN Meds:bisacodyl, docusate sodium, magnesium hydroxide, acetaminophen, ondansetron, albuterol, ipratropium-albuterol  Allergies:   ALLERGIES:    Metolazone and Thiazide-type diuretics        SOCIAL HISTORY:     TOBACCO:   reports that he has quit smoking. His smoking use included cigarettes. He started smoking about 42 years ago. He has a 60.00 pack-year smoking history. He has never used smokeless tobacco.     ETOH:   reports no history of alcohol use. Patient currently lives with family       FAMILY HISTORY:         Problem Relation Age of Onset    Arthritis Father     Asthma Father     Heart Disease Father     High Blood Pressure Father     Cancer Mother         ovarian cancer    Diabetes Mother     Cancer Sister     Heart Disease Sister     Diabetes Brother     Cancer Sister     Heart Disease Sister     Heart Disease Brother     Diabetes Brother     Heart Disease Brother        REVIEW OF SYSTEMS:     Constitutional: no fever, no night sweats, no fatigue, no weight loss. Head: no head ache , no head injury, no migranes. Eye: no eye discharge, blurring of vision, no double vision,no eye pain. Ears: no hearing difficulty, no tinnitus  Mouth/throat: no ulceration, dental caries , dysphagia, no hoarseness and voice change  Respiratory: + Cough no chest pain, + shortness of breath,no wheezing  CVS: no palpitation, no chest pain,   GI: no abdominal pain, no nausea , no vomiting, no constipation,no diarrhea.   LAURA: no dysuria, frequency and urgency, no hematuria, no kidney stones  Musculoskeletal: no joint pain, swelling obstructive pulmonary disease) ([facility]) J44.9    COPD with exacerbation ([facility]) J44.1    Obesity (BMI 30-39. 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    Atrial fibrillation, chronic ([facility]) I48.20    S/P cardiac pacemaker procedure Z95.0    NSTEMI (non-ST elevated myocardial infarction) ([facility]) I21.4    Respiratory failure with hypercapnia ([facility]) J96.92    Pneumonia J18.9    Pulmonary edema cardiac cause ([facility]) I50.1    Respiratory distress R06.03    COPD exacerbation ([facility]) J44.1    CHF (congestive heart failure) ([facility]) S65.1    Systolic CHF, acute on chronic ([facility]) I50.23    Hyperglycemia R73.9    Hyponatremia E87.1           Impression and Recommendation:   Abdominal wall abscess with cellulitis related to MRSA infection: The wound was debrided at ED CT he has induration and redness. Patient reports less pain since he had the incision and drainage. We will continue current antibiotic we will transition him to oral on discharge. We will continue packing of the wound twice a day and as needed. Infection Control:   Contact isolation  Discharge Planning (Coordination of out patient care:   Home with oral antibiotic  Thank you Chaya May MD for allowing me to participate in this patient's care.     Unk Sandhoff, MD, MD,FACP 7/9/2021 3:00 PM

## 2021-07-09 NOTE — CARE COORDINATION
7/9/21, 1:04 PM EDT    DISCHARGE PLANNING EVALUATION      Call made to [address] [place] [address] to update, waiting call back from [facility] staff.

## 2021-07-09 NOTE — CONSULTS
[facility]  General Surgery Consult Note  Vladimir Wolfe MD MD FACS    Pt Name: Alice Prescott  MRN: [id number]  YOB: 1935  Date of evaluation: 7/9/2021  Primary Care Physician: Live Lewis MD  Patient evaluated at the request of Dr Benji Heller    Reason for evaluation: Abdominal Wall Wound with Necrosis  IMPRESSIONS:   1. Left lower abdominal wall cellulitis with abscess and necrosis  2. Hyponatremia  3. Hypertension  4. Atrial fibrillation  5. COPD  6. Obesity (BMI 30)  7. does not have any pertinent problems on file. PLANS:   1. IV antibiotics. Infectious disease consulted. Follow cultures and infectious disease recommendations. 2. Packing changes. 3. Patient with necrosis and is in need of excisional debridement of the wound in hopes to get appropriate healing. Patient on Xarelto. Hold Xarelto for 24 hours and most likely proceed with excisional debridement Blake morning if okay with admitting and consultants. Risks, benefits and alternatives discussed in detail with patient. All questions answered. He agrees to proceed with debridement. 4. N.p.o. at midnight tomorrow  5. Hyponatremia treatment per admitting  6. Home medications other than Xarelto  7. A.m. labs  8. Further care per admitting  SUBJECTIVE:   Chief complaint: Abdominal Wall Wound with Necrosis    History of present illness: Ifrah Culp is an 82-year-old male who was asked to see in consultation secondary to a left lower quadrant abdominal wall wound. He states it started about a week ago as a small pimple. Gradually grew in size. Became more tender. More swollen and red. Came to the emergency department due to the pain. Found to have an abscess. I&D performed. Currently has packing in place. Started on antibiotics. Infectious disease now following. Also found to have hyponatremia and being treated by admitting. Patient denies any trauma to the area. Has necrosis around the center of the wound.   Denies any current nausea or vomiting. Currently eating a regular diet for dinner and tolerating. Passing flatus. He states he has had normal bowel function. No known fever, chills or sweats per patient. Denies any trauma to the area. He states the pain is slightly better after it has been drained. Awaiting final cultures. Preliminary cultures show MRSA. Past Medical History:      Diagnosis Date    Arthritis     Asthma     [facility]     [facility]     COPD (chronic obstructive pulmonary disease) [facility]     Encephalopathy acute 11/20/2013    Hyperlipidemia     Hypertension     Muscular deconditioning 12/23/2013    Pneumonia      Past Surgical History:      Procedure Laterality Date    ABDOMEN SURGERY      EKG 12-LEAD  8/5/2015         FRACTURE SURGERY  unsure    [facility]    OTHER SURGICAL HISTORY  10-30-13    abdominal exploration, closure of evisceration with retention sutures-Dr. Margarita Ortega     OTHER SURGICAL HISTORY  10-25-13    Placement of gastrostomy tube-Dr. Margarita Ortega      Medications:  Prior to Admission medications    Medication Sig Start Date End Date Taking? Authorizing Provider   candesartan (ATACAND) 16 MG tablet Take 16 mg by mouth 2 times daily   Yes Historical Provider, MD   vitamin B-12 (CYANOCOBALAMIN) 100 MCG tablet Take 1,000 mcg by mouth daily   Yes Historical Provider, MD   polyethylene glycol (GLYCOLAX) 17 g packet Take 17 g by mouth daily as needed for Constipation   Yes Historical Provider, MD   triamcinolone (KENALOG) 0.1 % cream Apply 1 each topically every 12 hours as needed (rash) Apply topically 2 times daily.    Yes Historical Provider, MD   Cholecalciferol (VITAMIN D) 50 MCG (2000 UT) CAPS capsule Take 2,000 Units by mouth daily   Yes Historical Provider, MD Melina Dallas 18 MCG inhalation capsule INHALE THE CONTENTS OF ONE CAPSULE BY MOUTH ONCE DAILY AS DIRECTED 12/15/16  Yes Nancy Batista TREVA Colmenares CNP   docusate sodium (COLACE) 100 MG capsule TAKE ONE (1) CAPSULE BY MOUTH TWICE DAILY  Patient taking differently: Take 50 mg by mouth every 8 hours as needed for Constipation  10/20/16  Yes Josie Baxter MD   XARELTO 20 MG TABS tablet TAKE ONE (1) TABLET BY MOUTH EACH EVENING WITH MEAL 10/20/16  Yes Josie Baxter MD   albuterol sulfate HFA (PROAIR HFA) 108 (90 BASE) MCG/ACT inhaler INHALE 2 PUFFS BY MOUTH EVERY 4 HOURS AS NEEDED 8/30/16  Yes TREVA Arizmendi CNP   LANOXIN 125 MCG tablet Take 1 tablet by mouth daily 8/30/16  Yes TREVA Arizmendi CNP   traZODone (DESYREL) 150 MG tablet TAKE (1) TABLET BY MOUTH NIGHTLY AS NEEDED FOR SLEEP  Patient taking differently: Take 150 mg by mouth nightly Indications: Depression  7/27/16  Yes Josie Baxter MD   fluticasone (FLONASE) 50 MCG/ACT nasal spray INSTILL 1 SPRAY INTRANASALLY DAILY  Patient taking differently: 2 times daily  7/26/16  Yes Josie Baxter MD   clotrimazole (LOTRIMIN) 1 % cream APPLY TOPICALLY AS DIRECTED 7/20/16  Yes Josie Baxter MD   spironolactone (ALDACTONE) 25 MG tablet TAKE 1 TABLET BY MOUTH ONCE DAILY 4/19/16  Yes Josie Baxter MD   cloNIDine (CATAPRES) 0.2 MG tablet Take 1 tablet by mouth three times daily  Patient taking differently: Take 0.2 mg by mouth 2 times daily  4/19/16  Yes Josie Baxter MD   SYMBICORT 160-4.5 MCG/ACT AERO INHALE 2 (TWO) PUFFS BY MOUTH TWICE DAILY 3/1/16  Yes Rajat Godiwn MD   diltiazem (CARDIZEM CD) 120 MG ER capsule Take 1 capsule by mouth daily 1/19/16  Yes Josie Baxter MD   furosemide (LASIX) 20 MG tablet Take 1 tablet by mouth daily 1/19/16  Yes Josie Baxter MD   metoprolol (LOPRESSOR) 25 MG tablet Take 1 tablet by mouth every 12 hours  Patient taking differently: Take 50 mg by mouth every 12 hours  1/19/16  Yes Josie Baxter MD FLUoxetine (PROZAC) 10 MG capsule TAKE ONE (1) CAPSULE BY MOUTH ONCE DAILY 1/18/16  Yes Jen Dietz MD   AMITIZA 24 MCG capsule TAKE ONE (1) CAPSULE BY MOUTH ONCE DAILY WITH FOOD 1/7/16  Yes Jen Dietz MD   Multiple Vitamins-Minerals (CERTAVITE SENIOR/ANTIOXIDANT PO) Take by mouth   Yes Historical Provider, MD   polyvinyl alcohol (LIQUIFILM TEARS) 1.4 % ophthalmic solution 1 drop as needed   Yes Historical Provider, MD   latanoprost (XALATAN) 0.005 % ophthalmic solution 1 drop nightly   Yes Historical Provider, MD   magnesium hydroxide (MILK OF MAGNESIA CONCENTRATE) 2400 MG/10ML SUSP Take 2,400 mg by mouth once as needed   Yes Historical Provider, MD   acetaminophen 650 MG TABS Take 650 mg by mouth every 4 hours as needed 7/5/15  Yes Arline Cruz MD   albuterol (PROVENTIL) (2.5 MG/3ML) 0.083% nebulizer solution Take 3 mLs by nebulization every 6 hours as needed for Wheezing  Patient taking differently: Take 2.5 mg by nebulization 2 times daily  7/5/15  Yes Arline Cruz MD   guaiFENesin (ROBITUSSIN) 100 MG/5ML syrup Take 200 mg by mouth 4 times daily as needed for Cough or Congestion. Yes Historical Provider, MD   pantoprazole (PROTONIX) 40 MG tablet Take 40 mg by mouth daily    Historical Provider, MD   Lift Chair MISC by Does not apply route 1/11/16   Jen Dietz MD   Nutritional Supplements (ENSURE COMPLETE) LIQD Take 1 Can by mouth daily.     Historical Provider, MD    Scheduled Meds:   clindamycin (CLEOCIN) IV  600 mg Intravenous Q8H    docusate sodium  100 mg Oral BID    cloNIDine  0.2 mg Oral BID    metoprolol tartrate  50 mg Oral Q12H    pantoprazole  40 mg Oral Daily    dilTIAZem  120 mg Oral Daily    latanoprost  1 drop Both Eyes Nightly    digoxin  125 mcg Oral Daily    losartan  50 mg Oral Nightly    budesonide-formoterol  2 puff Inhalation BID    [Held by provider] rivaroxaban  20 mg Oral Daily     Continuous Infusions:  PRN Meds:.bisacodyl, Temp  Av °F (36.7 °C)  Min: 97.9 °F (36.6 °C)  Max: 98.1 °F (36.7 °C)  BP Range (72J): Systolic (13TAM), YNR:396 , Min:115 , XMM:909     Diastolic (99NYO), QJQ:42, Min:53, Max:80    Pulse Range (24h): Pulse  Av.3  Min: 61  Max: 78  Respiration Range (24h): Resp  Av.3  Min: 16  Max: 18  Current Pulse Ox (24h):  SpO2: 98 %  Pulse Ox Range (24h):  SpO2  Av %  Min: 96 %  Max: 98 %  Oxygen Amount and Delivery:    CONSTITUTIONAL: Alert and oriented times 3, no acute distress and cooperative to examination with proper mood and affect. SKIN: Skin color, texture, turgor normal. No rashes or lesions. LYMPH: no cervical nodes, no inguinal nodes  HEENT: Head is normocephalic, atraumatic. EOMI, PERRLA. NECK: Supple, symmetrical, trachea midline, no adenopathy, thyroid symmetric, not enlarged and no tenderness, skin normal.  CHEST/LUNGS: chest symmetric with normal A/P diameter, normal respiratory rate and rhythm, lungs clear to auscultation without wheezes, rales or rhonchi. No accessory muscle use. Scars None   CARDIOVASCULAR: Heart sounds are normal.  Regular rate and rhythm. Normal S1 and S2.  ABDOMEN: Normal shape. Normal bowel sounds. Soft, nondistended, no masses or organomegaly. no evidence of incarcerated/strangulated hernia. Percussion: Normal without hepatosplenomegally. Tenderness: absent. Abdominal wall wound left lower quadrant. Packing. Some necrosis. Erythema and induration. No crepitance. RECTAL: deferred, not clinically indicated  NEUROLOGIC: There are no focalizing motor or sensory deficits. CN II-XII are grossly intact. EXTREMITIES: no cyanosis, no clubbing and no edema.   LABS:     Recent Labs     21  1316 21  1316 21  0937 21  0823   WBC 12.7*  --   --  11.3* 11.8*   HGB 10.4*  --   --  10.1* 10.7*   HCT 30.3*  --   --  29.6* 32.0*     --   --  236 270   *   < > 127* 128* 129*   K 4.8  --   --  4.4 4.5   CL 89*  --   --  93*

## 2021-07-09 NOTE — PROGRESS NOTES
Wound ostomy present to room for consult regarding abd wound/abscess. Patient in bed upon arrival. Assessment and photo to follow. Patient was admitted with subcutaneous abdominal abscess. Incision and drainage of abscess was performed in ER. Old dressing removed from left left abdomen. Patient noted to have incisional wound with evidence of necrosis, induration, and undermining. Wound appears in need of excisional debridement by general surgery. Wound packed with Mes[place], covered with ABD pad, secured with tape. Staff to change twice daily. Patient in bed, call light in reach. Additional supplies in room. Spoke with primary RN to review findings and recommendation for general surgery consult. Recommend staff to follow dressing recommendations of general surgeon once wound is debrided. Will continue to follow. Call with concerns. Wound type: Nonhealing left abdominal wound  Wound size: 0.5cm x 2.4cm x 1.8cm  Undermining or Tunneling: Undermining from 12-12 of 1.2cm  Wound assessment/color: YAMILA due to depth  Drainage amount:  Moderate  Drainage description: Tan/purulent  Odor: Mild  Margins: Unattached  Peggy wound: Slough, indurated, erythema  Exposed structure: YAMILA

## 2021-07-09 NOTE — PLAN OF CARE
Care plan reviewed with patient and   Problem: Falls - Risk of:  Goal: Will remain free from falls  Description: Will remain free from falls  7/9/2021 1356 by Douglas Arizmendi RN  Outcome: Met This Shift  Note: USES CALL LIGHT  7/9/2021 0056 by Blanca Dumont RN  Outcome: Ongoing  Goal: Absence of physical injury  Description: Absence of physical injury  7/9/2021 1356 by Douglas Arizmendi RN  Outcome: Met This Shift  Note: NO INJURIES  7/9/2021 0056 by Blanca Dumont RN  Outcome: Ongoing     Problem: DISCHARGE BARRIERS  Goal: Patient's continuum of care needs are met  Outcome: Met This Shift  Note: TO BE DETERMINED    verbalize understanding of the plan of care and contribute to goal setting.

## 2021-07-09 NOTE — PROGRESS NOTES
Milk of magnesium given to facilitate a  bm  As pt. C/o constipation. Alevyns changed time two to left arm and one applied to small open pink area to left buttock along with protective alevyn to coccyx. Bilateral heel alevyns changed also by unit manager. Ostomy in and changed dressing to abdomen and otherwise dressing dry and intact. Per then notif. Via text to Dr. Rachel Peck of their recommendations of consult for general surgeon.

## 2021-07-10 LAB
ANION GAP SERPL CALCULATED.3IONS-SCNC: 12 MEQ/L (ref 8–16)
BUN BLDV-MCNC: 13 MG/DL (ref 7–22)
CALCIUM SERPL-MCNC: 9.3 MG/DL (ref 8.5–10.5)
CHLORIDE BLD-SCNC: 92 MEQ/L (ref 98–111)
CO2: 23 MEQ/L (ref 23–33)
CREAT SERPL-MCNC: 0.7 MG/DL (ref 0.4–1.2)
ERYTHROCYTE [DISTWIDTH] IN BLOOD BY AUTOMATED COUNT: 13.2 % (ref 11.5–14.5)
ERYTHROCYTE [DISTWIDTH] IN BLOOD BY AUTOMATED COUNT: 44.3 FL (ref 35–45)
GFR SERPL CREATININE-BSD FRML MDRD: > 90 ML/MIN/1.73M2
GLUCOSE BLD-MCNC: 93 MG/DL (ref 70–108)
HCT VFR BLD CALC: 31.5 % (ref 42–52)
HEMOGLOBIN: 10.5 GM/DL (ref 14–18)
MCH RBC QN AUTO: 30.8 PG (ref 26–33)
MCHC RBC AUTO-ENTMCNC: 33.3 GM/DL (ref 32.2–35.5)
MCV RBC AUTO: 92.4 FL (ref 80–94)
PLATELET # BLD: 260 THOU/MM3 (ref 130–400)
PMV BLD AUTO: 10.3 FL (ref 9.4–12.4)
POTASSIUM SERPL-SCNC: 4.6 MEQ/L (ref 3.5–5.2)
RBC # BLD: 3.41 MILL/MM3 (ref 4.7–6.1)
SODIUM BLD-SCNC: 127 MEQ/L (ref 135–145)
WBC # BLD: 11.6 THOU/MM3 (ref 4.8–10.8)

## 2021-07-10 PROCEDURE — 94640 AIRWAY INHALATION TREATMENT: CPT

## 2021-07-10 PROCEDURE — 6370000000 HC RX 637 (ALT 250 FOR IP): Performed by: INTERNAL MEDICINE

## 2021-07-10 PROCEDURE — 2500000003 HC RX 250 WO HCPCS: Performed by: INTERNAL MEDICINE

## 2021-07-10 PROCEDURE — 85027 COMPLETE CBC AUTOMATED: CPT

## 2021-07-10 PROCEDURE — 80048 BASIC METABOLIC PNL TOTAL CA: CPT

## 2021-07-10 PROCEDURE — 1200000003 HC TELEMETRY R&B

## 2021-07-10 PROCEDURE — 36415 COLL VENOUS BLD VENIPUNCTURE: CPT

## 2021-07-10 PROCEDURE — 99232 SBSQ HOSP IP/OBS MODERATE 35: CPT | Performed by: SURGERY

## 2021-07-10 RX ORDER — FLUTICASONE PROPIONATE 50 MCG
1 SPRAY, SUSPENSION (ML) NASAL DAILY PRN
Status: DISCONTINUED | OUTPATIENT
Start: 2021-07-10 | End: 2021-07-13 | Stop reason: HOSPADM

## 2021-07-10 RX ADMIN — CLINDAMYCIN PHOSPHATE 600 MG: 600 INJECTION, SOLUTION INTRAVENOUS at 20:55

## 2021-07-10 RX ADMIN — CLINDAMYCIN PHOSPHATE 600 MG: 600 INJECTION, SOLUTION INTRAVENOUS at 03:37

## 2021-07-10 RX ADMIN — DILTIAZEM HYDROCHLORIDE 120 MG: 120 CAPSULE, EXTENDED RELEASE ORAL at 09:25

## 2021-07-10 RX ADMIN — LOSARTAN POTASSIUM 50 MG: 50 TABLET, FILM COATED ORAL at 20:56

## 2021-07-10 RX ADMIN — DIGOXIN 125 MCG: 250 TABLET ORAL at 09:25

## 2021-07-10 RX ADMIN — ACETAMINOPHEN 650 MG: 325 TABLET ORAL at 08:21

## 2021-07-10 RX ADMIN — DOCUSATE SODIUM 100 MG: 100 CAPSULE, LIQUID FILLED ORAL at 20:55

## 2021-07-10 RX ADMIN — CLONIDINE HYDROCHLORIDE 0.2 MG: 0.2 TABLET ORAL at 20:56

## 2021-07-10 RX ADMIN — CLINDAMYCIN PHOSPHATE 600 MG: 600 INJECTION, SOLUTION INTRAVENOUS at 12:31

## 2021-07-10 RX ADMIN — CLONIDINE HYDROCHLORIDE 0.2 MG: 0.2 TABLET ORAL at 09:26

## 2021-07-10 RX ADMIN — ACETAMINOPHEN 650 MG: 325 TABLET ORAL at 23:01

## 2021-07-10 RX ADMIN — METOPROLOL TARTRATE 50 MG: 50 TABLET, FILM COATED ORAL at 16:34

## 2021-07-10 RX ADMIN — METOPROLOL TARTRATE 50 MG: 50 TABLET, FILM COATED ORAL at 06:13

## 2021-07-10 RX ADMIN — DOCUSATE SODIUM 100 MG: 100 CAPSULE, LIQUID FILLED ORAL at 08:21

## 2021-07-10 RX ADMIN — LATANOPROST 1 DROP: 50 SOLUTION OPHTHALMIC at 20:56

## 2021-07-10 RX ADMIN — MAGNESIUM HYDROXIDE 30 ML: 2400 SUSPENSION ORAL at 09:26

## 2021-07-10 RX ADMIN — BUDESONIDE AND FORMOTEROL FUMARATE DIHYDRATE 2 PUFF: 160; 4.5 AEROSOL RESPIRATORY (INHALATION) at 18:08

## 2021-07-10 RX ADMIN — BUDESONIDE AND FORMOTEROL FUMARATE DIHYDRATE 2 PUFF: 160; 4.5 AEROSOL RESPIRATORY (INHALATION) at 07:53

## 2021-07-10 RX ADMIN — BISACODYL 10 MG: 10 SUPPOSITORY RECTAL at 20:55

## 2021-07-10 RX ADMIN — PANTOPRAZOLE SODIUM 40 MG: 40 TABLET, DELAYED RELEASE ORAL at 08:21

## 2021-07-10 ASSESSMENT — PAIN DESCRIPTION - PROGRESSION: CLINICAL_PROGRESSION: GRADUALLY IMPROVING

## 2021-07-10 ASSESSMENT — PAIN DESCRIPTION - ONSET: ONSET: ON-GOING

## 2021-07-10 ASSESSMENT — PAIN SCALES - GENERAL
PAINLEVEL_OUTOF10: 0
PAINLEVEL_OUTOF10: 0
PAINLEVEL_OUTOF10: 5
PAINLEVEL_OUTOF10: 0
PAINLEVEL_OUTOF10: 2
PAINLEVEL_OUTOF10: 5
PAINLEVEL_OUTOF10: 3

## 2021-07-10 ASSESSMENT — PAIN DESCRIPTION - PAIN TYPE: TYPE: ACUTE PAIN

## 2021-07-10 ASSESSMENT — PAIN DESCRIPTION - LOCATION: LOCATION: HEAD

## 2021-07-10 ASSESSMENT — PAIN DESCRIPTION - FREQUENCY: FREQUENCY: INTERMITTENT

## 2021-07-10 ASSESSMENT — PAIN DESCRIPTION - ORIENTATION: ORIENTATION: ANTERIOR

## 2021-07-10 ASSESSMENT — PAIN DESCRIPTION - DESCRIPTORS: DESCRIPTORS: ACHING;HEADACHE

## 2021-07-10 NOTE — FLOWSHEET NOTE
[facility]. [facility] PROGRESS NOTE      Patient: Pina Meade Music  Room #: 2D-80/399-V            YOB: 1935  Age: 82 y.o. Gender: male            Admit Date & Time: 7/7/2021 11:58 AM    Assessment:  Patient reported several current health challenges of significance  Patient related stories of past accidents and difficulties. Patient is a strong  and enjoys storytelling greatly    Interventions:   mostly listened and engaged in positive feedback. Outcomes:  Prayer offered and well received. Affirmation given for visit. Plan:    1. Continue to visit. Electronically signed by Darryl Joseph on 7/10/2021 at [address]  [phone]               07/10/21 0910   Encounter Summary   Services provided to: Patient;Patient and family together   Referral/Consult From: [address] [facility] Children;Family members   Continue Visiting   (07/10/2021 P F)   Complexity of Encounter Moderate   Length of Encounter 30 minutes   Spiritual Assessment Completed Yes   Routine   Type Initial   Assessment Approachable;Grieving; Anxious; Hopeful;Coping   Intervention Explored feelings, thoughts, concerns;Nurtured hope;Prayer;Rice;Sustaining presence/ Ministry of presence   Outcome Acceptance;Expressed gratitude;Expressed feelings of dolly, peace, and/or awe;Engaged in conversation;Encouraged;Receptive

## 2021-07-10 NOTE — PROGRESS NOTES
[address] Author MD Cristopher  Daily Progress Note    Pt Name: Arnaldo León Record Number: [id number]  Date of Birth 1935   Today's Date: 7/10/2021    Hospital day # 3     ASSESSMENT   Left abdominal wall abscess with necrosis  Hyponatremia  Hypertension  Atrial fibrillation  COPD  Obesity (BMI 30)   has a past medical history of Arthritis, Asthma, Atrial fibrillation ([facility] [address] CHF (congestive heart failure) ([facility] [address] COPD (chronic obstructive pulmonary disease) ([facility] [address] Encephalopathy acute, Hyperlipidemia, Hypertension, Muscular deconditioning, and Pneumonia. PLAN   IV antibiotics per infectious disease. Wound care today with packing changes. Holding blood thinners and planning excisional debridement of the necrotic wound in operating room tomorrow. Risks, benefits and alternatives discussed in detail with patient. All questions answered. He agrees and wishes to proceed. Consent. NPO at midnight. IV fluid hydration after midnight per recommendations of admitting due to fluid restriction status. A.m. labs  Continue medical management per admitting. SUBJECTIVE   Chief complaint: Left abdominal wall open wound pain    Stable overnight. Chart reviewed. Updated by nursing staff. Afebrile. Vital signs stable. Packing wound. Moderate amount of pain in and around the wound. No crepitance. Decreasing drainage. Necrosis in the middle of the wound. Has tolerated diet so far. Denies chest pain or shortness of breath. No new urinary complaints. Holding Xarelto for debridement tomorrow. Asymptomatic from hyponatremia. No fever, chills or sweats. Patient in agreement and wishing to proceed with debridement tomorrow.   CURRENT MEDICATIONS   Scheduled Meds:   clindamycin (CLEOCIN) IV  600 mg Intravenous Q8H    docusate sodium  100 mg Oral BID    cloNIDine  0.2 mg Oral BID    metoprolol tartrate  50 mg Oral Q12H    pantoprazole  40 mg Oral Daily    dilTIAZem  120 mg Oral Daily    latanoprost  1 drop Both Eyes Nightly    digoxin  125 mcg Oral Daily    losartan  50 mg Oral Nightly    budesonide-formoterol  2 puff Inhalation BID    [Held by provider] rivaroxaban  20 mg Oral Daily     Continuous Infusions:  PRN Meds:.bisacodyl, docusate sodium, magnesium hydroxide, acetaminophen, ondansetron, albuterol, ipratropium-albuterol  OBJECTIVE   CURRENT VITALS:  height is 6' 1\" (1.854 m) and weight is 233 lb 7.5 oz (105.9 kg). His oral temperature is 97.8 °F (36.6 °C). His blood pressure is 133/64 and his pulse is 62. His respiration is 16 and oxygen saturation is 98%. Temperature Range (24h):Temp: 97.8 °F (36.6 °C) Temp  Av °F (36.7 °C)  Min: 97.8 °F (36.6 °C)  Max: 98.1 °F (36.7 °C)  BP Range (77O): Systolic (27LYU), PWR:500 , Min:124 , HZX:360     Diastolic (02VRY), GEQ:13, Min:61, Max:80    Pulse Range (24h): Pulse  Av.5  Min: 62  Max: 78  Respiration Range (24h): Resp  Av.5  Min: 16  Max: 18  Current Pulse Ox (24h):  SpO2: 98 %  Pulse Ox Range (24h):  SpO2  Av %  Min: 97 %  Max: 99 %  Oxygen Amount and Delivery:    Incentive Spirometry Tx:            GENERAL: alert, cooperative, no distress  SKIN: Left abdominal wall open necrotic wound. Packing in place. No crepitance. HEENT: Head is normocephalic, atraumatic. EOMI, PERRLA. NECK: Supple, symmetrical, trachea midline, no adenopathy, thyroid symmetric, not enlarged and no tenderness, skin normal.  LUNGS: clear to ausculation, without wheezes, rales or rhonci  HEART: normal rate and regular rhythm  ABDOMEN: soft, moderate tenderness around necrotic open wound, non-distended, bowel sounds present - no generalized guarding or peritoneal signs. Packing currently in place and left-sided abdominal wound with dressing overlying it. No bleeding. Minimal drainage. Necrosis unchanged. No crepitance. NEUROLOGIC: There are no focalizing motor or sensory deficits. CN II-XII are grossly intact. EXTREMITIES: no cyanosis, no clubbing and no edema. In: 1375.2 [P.O.:1220; I.V.:155.2]  Out: 600 [Urine:600]  Date 07/10/21 0000 - 07/10/21 2359   Shift 0891-7085 4019-8623 8773-2281 24 Hour Total   INTAKE   P.O.(mL/kg/hr) 200(0.2)   200   I. V.(mL/kg) 77.6(0.7)   77.6(0.7)   Shift Total(mL/kg) 277.6(2.6)   277.6(2.6)   OUTPUT   Urine(mL/kg/hr) 600(0.7)   600   Shift Total(mL/kg) 600(5.7)   600(5.7)   Weight (kg) 105.9 105.9 105.9 105.9     LABS     Recent Labs     07/08/21  0937 07/09/21  0823 07/10/21  0713   WBC 11.3* 11.8* 11.6*   HGB 10.1* 10.7* 10.5*   HCT 29.6* 32.0* 31.5*    270 260   * 129* 127*   K 4.4 4.5 4.6   CL 93* 90* 92*   CO2 26 27 23   BUN 10 10 13   CREATININE 0.8 0.8 0.7   CALCIUM 9.1 9.6 9.3      No results for input(s): PTT, INR in the last 72 hours. Invalid input(s): PT  No results for input(s): AST, ALT, BILITOT, BILIDIR, AMYLASE, LIPASE, LDH, LACTA in the last 72 hours. No results for input(s): TROPONINT in the last 72 hours.     RADIOLOGY   none    Electronically signed by Louisa Rodriguez MD on 7/10/2021 at 8:35 AM

## 2021-07-10 NOTE — PLAN OF CARE
Problem: Falls - Risk of:  Goal: Will remain free from falls  Description: Will remain free from falls  Outcome: Ongoing  Note: No falls noted this shift, patient uses call light appropriately and waits for staff prior to transferring self. Patient able to voice needs appropriately. Gait is steady with walker / gait belt and staff assistance. Patient is limited assistance with most ADLs. Goal: Absence of physical injury  Description: Absence of physical injury  Outcome: Ongoing  Note: No new physical injury noted this shift so far. Problem: Impaired respiratory status  Goal: Clear lung sounds  7/10/2021 0757 by Jayme Garcia RCP  Outcome: Ongoing  Note:   Pt continues on MDI's for maintenance of COPD and pt does MDI's at home. Problem: DISCHARGE BARRIERS  Goal: Patient's continuum of care needs are met  Outcome: Ongoing  Note: Patient's continuum of care needs continue to be met. Problem: Infection - Methicillin-Resistant Staphylococcus Aureus Infection:  Goal: Absence of methicillin-resistant Staphylococcus aureus infection  Description: Absence of methicillin-resistant Staphylococcus aureus infection  Outcome: Ongoing  Note: No tachycardia, hypotension, nor fever noted this shift. Wound is red, swollen, and has purulent drainage. Problem: Pain:  Description: Pain management should include both nonpharmacologic and pharmacologic interventions. Goal: Pain level will decrease  Description: Pain level will decrease  Outcome: Ongoing  Note: Patient verbalizes pain is able to be controlled with prn pain medications, pain goal of 0 / 10 is achieved. Receiving PO PRN Tylenol. Rest / repositioning / ice is also effective for pain control. Care plan reviewed with patient and family. Patient and family verbalize understanding of the plan of care and contribute to goal setting.

## 2021-07-10 NOTE — PLAN OF CARE
Problem: Impaired respiratory status  Goal: Clear lung sounds  Outcome: Ongoing  Note:   Pt continues on MDI's for maintenance of COPD and pt does MDI's at home.

## 2021-07-10 NOTE — PROGRESS NOTES
INTERNAL MEDICINE Progress Note  7/10/2021 2:46 PM  Subjective:   Admit Date: 7/7/2021  PCP: Mitch Vazquez MD  Interval History: seen,   drainage from abd wound+,  abd pain+    Objective:   Vitals: BP (!) 120/55   Pulse 57   Temp 97.8 °F (36.6 °C) (Oral)   Resp 18   Ht 6' 1\" (1.854 m)   Wt 233 lb 7.5 oz (105.9 kg)   SpO2 95%   BMI 30.80 kg/m²   General appearance: alert and cooperative with exam  HEENT:  atraumatic  Neck:  no JVD  Lungs: clear to auscultation bilaterally  Heart: S1, S2 normal  Abdomen: normal findings: bowel sounds normal and symmetric and abnormal findings:  distended and left sided abscess-deroofed with wick  Extremities: no edema,   Neurologic: alert, oriented x3, thought content appropriate      Medications:   Scheduled Meds:   clindamycin (CLEOCIN) IV  600 mg Intravenous Q8H    docusate sodium  100 mg Oral BID    cloNIDine  0.2 mg Oral BID    metoprolol tartrate  50 mg Oral Q12H    pantoprazole  40 mg Oral Daily    dilTIAZem  120 mg Oral Daily    latanoprost  1 drop Both Eyes Nightly    digoxin  125 mcg Oral Daily    losartan  50 mg Oral Nightly    budesonide-formoterol  2 puff Inhalation BID    [Held by provider] rivaroxaban  20 mg Oral Daily     Continuous Infusions:    Lab Results:   CBC:   Recent Labs     07/08/21  0937 07/09/21  0823 07/10/21  0713   WBC 11.3* 11.8* 11.6*   HGB 10.1* 10.7* 10.5*    270 260     BMP:    Recent Labs     07/08/21  0937 07/09/21  0823 07/10/21  0713   * 129* 127*   K 4.4 4.5 4.6   CL 93* 90* 92*   CO2 26 27 23   BUN 10 10 13   CREATININE 0.8 0.8 0.7   GLUCOSE 134* 120* 93     Magnesium:    Lab Results   Component Value Date    MG 1.9 11/13/2016     TSH:    Lab Results   Component Value Date    TSH 1.540 07/07/2021     Organism Abnormal  07/07/2021  4:30  [facility]   Staphylococcus aureus    Aerobic Culture 07/07/2021  4:30  [facility]   heavy growth This is a MRSA (Methicillin Resistant Staphylococcus aureus)isolate. Isolates of MRSA (ORSA) Methicillin (Oxacillin) Resistant Staphylococcus aureus (coagulase positive) require patient be placed in CONTACT isolation. Methicillin(Oxacillin)resistant strains of staphylococci (MRSA)or(MRSE)should be considered resistant to all classes of cephalosporins, penems and beta-lactams. In the treatment of gram positive infections, GENTAMICIN should be CONSIDERED a SYNERGYSTIC agent ONLY. Performed by: [facility]  Source: abdomen       Site: swab left lower          Current Antibiotics: not stated   Susceptibility  Staphylococcus aureus (1)  Antibiotic Interpretation ROSA Status    gentamicin Sensitive <=0.5 mcg/mL Final    ciprofloxacin Resistant >=8 mcg/mL Final    oxacillin Resistant >=4 mcg/mL Final    ICR (D test) Negative Neg  mcg/mL Final     (Dtest) ICR- inducible clinda resistance    If +, then inducible erm gene       present. Clindamycin may be       effective in some patients.       trimethoprim-sulfamethoxazole Sensitive <=10 mcg/mL Final    clindamycin Sensitive <=0.25 mcg/mL Final    tetracycline Sensitive <=1 mcg/mL Final        Assessment and Plan:   1. Abscess left abd wall s/p I/D  2. Cellulitis abd wall  3. Hyponatremia  4. HTN  5. A fib  6.  COPD     Cont wound care, stable for OR tomorrow am  Cont IV clinda  Hyponatremia- hold prozac, restrict free water 1L per day  Am labs    Swetha Martins MD, MD

## 2021-07-11 ENCOUNTER — ANESTHESIA (OUTPATIENT)
Dept: OPERATING ROOM | Age: 86
DRG: 571 | End: 2021-07-11
Payer: MEDICARE

## 2021-07-11 ENCOUNTER — ANESTHESIA EVENT (OUTPATIENT)
Dept: OPERATING ROOM | Age: 86
DRG: 571 | End: 2021-07-11
Payer: MEDICARE

## 2021-07-11 VITALS — SYSTOLIC BLOOD PRESSURE: 101 MMHG | DIASTOLIC BLOOD PRESSURE: 53 MMHG | OXYGEN SATURATION: 98 %

## 2021-07-11 LAB
ANION GAP SERPL CALCULATED.3IONS-SCNC: 10 MEQ/L (ref 8–16)
BUN BLDV-MCNC: 16 MG/DL (ref 7–22)
CALCIUM SERPL-MCNC: 9.1 MG/DL (ref 8.5–10.5)
CHLORIDE BLD-SCNC: 93 MEQ/L (ref 98–111)
CO2: 21 MEQ/L (ref 23–33)
CREAT SERPL-MCNC: 0.7 MG/DL (ref 0.4–1.2)
ERYTHROCYTE [DISTWIDTH] IN BLOOD BY AUTOMATED COUNT: 13.3 % (ref 11.5–14.5)
ERYTHROCYTE [DISTWIDTH] IN BLOOD BY AUTOMATED COUNT: 47.4 FL (ref 35–45)
GFR SERPL CREATININE-BSD FRML MDRD: > 90 ML/MIN/1.73M2
GLUCOSE BLD-MCNC: 106 MG/DL (ref 70–108)
HCT VFR BLD CALC: 32 % (ref 42–52)
HEMOGLOBIN: 10.3 GM/DL (ref 14–18)
MCH RBC QN AUTO: 31.4 PG (ref 26–33)
MCHC RBC AUTO-ENTMCNC: 32.2 GM/DL (ref 32.2–35.5)
MCV RBC AUTO: 97.6 FL (ref 80–94)
PLATELET # BLD: 233 THOU/MM3 (ref 130–400)
PMV BLD AUTO: 10.4 FL (ref 9.4–12.4)
POTASSIUM SERPL-SCNC: 5 MEQ/L (ref 3.5–5.2)
RBC # BLD: 3.28 MILL/MM3 (ref 4.7–6.1)
SODIUM BLD-SCNC: 124 MEQ/L (ref 135–145)
WBC # BLD: 9.9 THOU/MM3 (ref 4.8–10.8)

## 2021-07-11 PROCEDURE — 7100000001 HC PACU RECOVERY - ADDTL 15 MIN: Performed by: SURGERY

## 2021-07-11 PROCEDURE — 6370000000 HC RX 637 (ALT 250 FOR IP): Performed by: SURGERY

## 2021-07-11 PROCEDURE — 2709999900 HC NON-CHARGEABLE SUPPLY: Performed by: SURGERY

## 2021-07-11 PROCEDURE — 6360000002 HC RX W HCPCS: Performed by: NURSE ANESTHETIST, CERTIFIED REGISTERED

## 2021-07-11 PROCEDURE — 87205 SMEAR GRAM STAIN: CPT

## 2021-07-11 PROCEDURE — 11005 DBRDMT SKIN ABDOMINAL WALL: CPT | Performed by: SURGERY

## 2021-07-11 PROCEDURE — 80048 BASIC METABOLIC PNL TOTAL CA: CPT

## 2021-07-11 PROCEDURE — 36415 COLL VENOUS BLD VENIPUNCTURE: CPT

## 2021-07-11 PROCEDURE — 85027 COMPLETE CBC AUTOMATED: CPT

## 2021-07-11 PROCEDURE — 1200000003 HC TELEMETRY R&B

## 2021-07-11 PROCEDURE — 7100000000 HC PACU RECOVERY - FIRST 15 MIN: Performed by: SURGERY

## 2021-07-11 PROCEDURE — 87070 CULTURE OTHR SPECIMN AEROBIC: CPT

## 2021-07-11 PROCEDURE — 87147 CULTURE TYPE IMMUNOLOGIC: CPT

## 2021-07-11 PROCEDURE — 2500000003 HC RX 250 WO HCPCS: Performed by: SURGERY

## 2021-07-11 PROCEDURE — 3700000001 HC ADD 15 MINUTES (ANESTHESIA): Performed by: SURGERY

## 2021-07-11 PROCEDURE — 3600000013 HC SURGERY LEVEL 3 ADDTL 15MIN: Performed by: SURGERY

## 2021-07-11 PROCEDURE — 3700000000 HC ANESTHESIA ATTENDED CARE: Performed by: SURGERY

## 2021-07-11 PROCEDURE — 87077 CULTURE AEROBIC IDENTIFY: CPT

## 2021-07-11 PROCEDURE — 87075 CULTR BACTERIA EXCEPT BLOOD: CPT

## 2021-07-11 PROCEDURE — 94640 AIRWAY INHALATION TREATMENT: CPT

## 2021-07-11 PROCEDURE — 6370000000 HC RX 637 (ALT 250 FOR IP): Performed by: INTERNAL MEDICINE

## 2021-07-11 PROCEDURE — 87186 SC STD MICRODIL/AGAR DIL: CPT

## 2021-07-11 PROCEDURE — 2580000003 HC RX 258: Performed by: SURGERY

## 2021-07-11 PROCEDURE — 2580000003 HC RX 258: Performed by: NURSE ANESTHETIST, CERTIFIED REGISTERED

## 2021-07-11 PROCEDURE — 0JB80ZZ EXCISION OF ABDOMEN SUBCUTANEOUS TISSUE AND FASCIA, OPEN APPROACH: ICD-10-PCS | Performed by: SURGERY

## 2021-07-11 PROCEDURE — 2500000003 HC RX 250 WO HCPCS: Performed by: INTERNAL MEDICINE

## 2021-07-11 PROCEDURE — 3600000003 HC SURGERY LEVEL 3 BASE: Performed by: SURGERY

## 2021-07-11 RX ORDER — ONDANSETRON 2 MG/ML
4 INJECTION INTRAMUSCULAR; INTRAVENOUS
Status: DISCONTINUED | OUTPATIENT
Start: 2021-07-11 | End: 2021-07-11 | Stop reason: HOSPADM

## 2021-07-11 RX ORDER — DEXAMETHASONE SODIUM PHOSPHATE 10 MG/ML
INJECTION, EMULSION INTRAMUSCULAR; INTRAVENOUS PRN
Status: DISCONTINUED | OUTPATIENT
Start: 2021-07-11 | End: 2021-07-11 | Stop reason: SDUPTHER

## 2021-07-11 RX ORDER — ONDANSETRON 2 MG/ML
INJECTION INTRAMUSCULAR; INTRAVENOUS PRN
Status: DISCONTINUED | OUTPATIENT
Start: 2021-07-11 | End: 2021-07-11 | Stop reason: SDUPTHER

## 2021-07-11 RX ORDER — MORPHINE SULFATE 4 MG/ML
4 INJECTION, SOLUTION INTRAMUSCULAR; INTRAVENOUS
Status: DISCONTINUED | OUTPATIENT
Start: 2021-07-11 | End: 2021-07-13 | Stop reason: HOSPADM

## 2021-07-11 RX ORDER — MEPERIDINE HYDROCHLORIDE 25 MG/ML
12.5 INJECTION INTRAMUSCULAR; INTRAVENOUS; SUBCUTANEOUS EVERY 5 MIN PRN
Status: DISCONTINUED | OUTPATIENT
Start: 2021-07-11 | End: 2021-07-11 | Stop reason: HOSPADM

## 2021-07-11 RX ORDER — HYDROCODONE BITARTRATE AND ACETAMINOPHEN 5; 325 MG/1; MG/1
1 TABLET ORAL EVERY 4 HOURS PRN
Status: DISCONTINUED | OUTPATIENT
Start: 2021-07-11 | End: 2021-07-13 | Stop reason: HOSPADM

## 2021-07-11 RX ORDER — PROPOFOL 10 MG/ML
INJECTION, EMULSION INTRAVENOUS PRN
Status: DISCONTINUED | OUTPATIENT
Start: 2021-07-11 | End: 2021-07-11 | Stop reason: SDUPTHER

## 2021-07-11 RX ORDER — POLYETHYLENE GLYCOL 3350 17 G/17G
17 POWDER, FOR SOLUTION ORAL DAILY
Status: DISCONTINUED | OUTPATIENT
Start: 2021-07-11 | End: 2021-07-13 | Stop reason: HOSPADM

## 2021-07-11 RX ORDER — AMMONIUM LACTATE 12 G/100G
LOTION TOPICAL PRN
Status: DISCONTINUED | OUTPATIENT
Start: 2021-07-11 | End: 2021-07-13 | Stop reason: HOSPADM

## 2021-07-11 RX ORDER — SODIUM PHOSPHATE,MONO-DIBASIC 19G-7G/118
1 ENEMA (ML) RECTAL DAILY PRN
Status: DISCONTINUED | OUTPATIENT
Start: 2021-07-11 | End: 2021-07-13 | Stop reason: HOSPADM

## 2021-07-11 RX ORDER — HYDROCODONE BITARTRATE AND ACETAMINOPHEN 5; 325 MG/1; MG/1
2 TABLET ORAL EVERY 4 HOURS PRN
Status: DISCONTINUED | OUTPATIENT
Start: 2021-07-11 | End: 2021-07-13 | Stop reason: HOSPADM

## 2021-07-11 RX ORDER — FENTANYL CITRATE 50 UG/ML
INJECTION, SOLUTION INTRAMUSCULAR; INTRAVENOUS PRN
Status: DISCONTINUED | OUTPATIENT
Start: 2021-07-11 | End: 2021-07-11 | Stop reason: SDUPTHER

## 2021-07-11 RX ORDER — SODIUM CHLORIDE 9 MG/ML
25 INJECTION, SOLUTION INTRAVENOUS PRN
Status: DISCONTINUED | OUTPATIENT
Start: 2021-07-11 | End: 2021-07-13 | Stop reason: HOSPADM

## 2021-07-11 RX ORDER — SODIUM CHLORIDE 0.9 % (FLUSH) 0.9 %
5-40 SYRINGE (ML) INJECTION PRN
Status: DISCONTINUED | OUTPATIENT
Start: 2021-07-11 | End: 2021-07-13 | Stop reason: HOSPADM

## 2021-07-11 RX ORDER — ONDANSETRON 2 MG/ML
4 INJECTION INTRAMUSCULAR; INTRAVENOUS EVERY 6 HOURS PRN
Status: DISCONTINUED | OUTPATIENT
Start: 2021-07-11 | End: 2021-07-13 | Stop reason: HOSPADM

## 2021-07-11 RX ORDER — LABETALOL 20 MG/4 ML (5 MG/ML) INTRAVENOUS SYRINGE
5 EVERY 10 MIN PRN
Status: DISCONTINUED | OUTPATIENT
Start: 2021-07-11 | End: 2021-07-11 | Stop reason: HOSPADM

## 2021-07-11 RX ORDER — TOLVAPTAN 15 MG/1
15 TABLET ORAL DAILY
Status: COMPLETED | OUTPATIENT
Start: 2021-07-11 | End: 2021-07-12

## 2021-07-11 RX ORDER — SODIUM CHLORIDE 0.9 % (FLUSH) 0.9 %
5-40 SYRINGE (ML) INJECTION EVERY 12 HOURS SCHEDULED
Status: DISCONTINUED | OUTPATIENT
Start: 2021-07-11 | End: 2021-07-13 | Stop reason: HOSPADM

## 2021-07-11 RX ORDER — MORPHINE SULFATE 2 MG/ML
2 INJECTION, SOLUTION INTRAMUSCULAR; INTRAVENOUS
Status: DISCONTINUED | OUTPATIENT
Start: 2021-07-11 | End: 2021-07-13 | Stop reason: HOSPADM

## 2021-07-11 RX ORDER — ONDANSETRON 4 MG/1
4 TABLET, ORALLY DISINTEGRATING ORAL EVERY 8 HOURS PRN
Status: DISCONTINUED | OUTPATIENT
Start: 2021-07-11 | End: 2021-07-13 | Stop reason: HOSPADM

## 2021-07-11 RX ORDER — SODIUM CHLORIDE 9 MG/ML
INJECTION, SOLUTION INTRAVENOUS CONTINUOUS
Status: DISCONTINUED | OUTPATIENT
Start: 2021-07-11 | End: 2021-07-12

## 2021-07-11 RX ORDER — FENTANYL CITRATE 50 UG/ML
50 INJECTION, SOLUTION INTRAMUSCULAR; INTRAVENOUS EVERY 5 MIN PRN
Status: DISCONTINUED | OUTPATIENT
Start: 2021-07-11 | End: 2021-07-11 | Stop reason: HOSPADM

## 2021-07-11 RX ORDER — SODIUM CHLORIDE 9 MG/ML
INJECTION, SOLUTION INTRAVENOUS CONTINUOUS PRN
Status: DISCONTINUED | OUTPATIENT
Start: 2021-07-11 | End: 2021-07-11 | Stop reason: SDUPTHER

## 2021-07-11 RX ADMIN — HYDROCODONE BITARTRATE AND ACETAMINOPHEN 1 TABLET: 5; 325 TABLET ORAL at 16:46

## 2021-07-11 RX ADMIN — CLONIDINE HYDROCHLORIDE 0.2 MG: 0.2 TABLET ORAL at 10:20

## 2021-07-11 RX ADMIN — SODIUM CHLORIDE, PRESERVATIVE FREE 10 ML: 5 INJECTION INTRAVENOUS at 16:47

## 2021-07-11 RX ADMIN — CLINDAMYCIN PHOSPHATE 600 MG: 600 INJECTION, SOLUTION INTRAVENOUS at 10:21

## 2021-07-11 RX ADMIN — METOPROLOL TARTRATE 50 MG: 50 TABLET, FILM COATED ORAL at 05:51

## 2021-07-11 RX ADMIN — POLYETHYLENE GLYCOL 3350 17 G: 17 POWDER, FOR SOLUTION ORAL at 18:34

## 2021-07-11 RX ADMIN — LATANOPROST 1 DROP: 50 SOLUTION OPHTHALMIC at 20:10

## 2021-07-11 RX ADMIN — DILTIAZEM HYDROCHLORIDE 120 MG: 120 CAPSULE, EXTENDED RELEASE ORAL at 10:21

## 2021-07-11 RX ADMIN — CLONIDINE HYDROCHLORIDE 0.2 MG: 0.2 TABLET ORAL at 20:09

## 2021-07-11 RX ADMIN — CLINDAMYCIN PHOSPHATE 600 MG: 600 INJECTION, SOLUTION INTRAVENOUS at 20:09

## 2021-07-11 RX ADMIN — FLUTICASONE PROPIONATE 1 SPRAY: 50 SPRAY, METERED NASAL at 20:10

## 2021-07-11 RX ADMIN — MAGNESIUM HYDROXIDE 30 ML: 2400 SUSPENSION ORAL at 18:36

## 2021-07-11 RX ADMIN — DOCUSATE SODIUM 100 MG: 100 CAPSULE, LIQUID FILLED ORAL at 20:12

## 2021-07-11 RX ADMIN — BISACODYL 10 MG: 10 SUPPOSITORY RECTAL at 20:09

## 2021-07-11 RX ADMIN — PROPOFOL 130 MG: 10 INJECTION, EMULSION INTRAVENOUS at 07:39

## 2021-07-11 RX ADMIN — Medication 15 MG: at 16:54

## 2021-07-11 RX ADMIN — CLINDAMYCIN PHOSPHATE 600 MG: 600 INJECTION, SOLUTION INTRAVENOUS at 03:30

## 2021-07-11 RX ADMIN — HYDROCODONE BITARTRATE AND ACETAMINOPHEN 1 TABLET: 5; 325 TABLET ORAL at 23:19

## 2021-07-11 RX ADMIN — TRAZODONE HYDROCHLORIDE 150 MG: 100 TABLET ORAL at 23:19

## 2021-07-11 RX ADMIN — PANTOPRAZOLE SODIUM 40 MG: 40 TABLET, DELAYED RELEASE ORAL at 10:21

## 2021-07-11 RX ADMIN — SODIUM CHLORIDE: 9 INJECTION, SOLUTION INTRAVENOUS at 16:48

## 2021-07-11 RX ADMIN — ONDANSETRON HYDROCHLORIDE 4 MG: 4 INJECTION, SOLUTION INTRAMUSCULAR; INTRAVENOUS at 07:48

## 2021-07-11 RX ADMIN — SODIUM CHLORIDE: 9 INJECTION, SOLUTION INTRAVENOUS at 07:36

## 2021-07-11 RX ADMIN — LOSARTAN POTASSIUM 50 MG: 50 TABLET, FILM COATED ORAL at 20:09

## 2021-07-11 RX ADMIN — FENTANYL CITRATE 100 MCG: 50 INJECTION, SOLUTION INTRAMUSCULAR; INTRAVENOUS at 07:39

## 2021-07-11 RX ADMIN — DIGOXIN 125 MCG: 250 TABLET ORAL at 10:15

## 2021-07-11 RX ADMIN — TRAZODONE HYDROCHLORIDE 150 MG: 100 TABLET ORAL at 01:59

## 2021-07-11 RX ADMIN — METOPROLOL TARTRATE 50 MG: 50 TABLET, FILM COATED ORAL at 16:52

## 2021-07-11 RX ADMIN — DEXAMETHASONE SODIUM PHOSPHATE 10 MG: 10 INJECTION, EMULSION INTRAMUSCULAR; INTRAVENOUS at 07:48

## 2021-07-11 RX ADMIN — DOCUSATE SODIUM 100 MG: 100 CAPSULE, LIQUID FILLED ORAL at 10:20

## 2021-07-11 RX ADMIN — BUDESONIDE AND FORMOTEROL FUMARATE DIHYDRATE 2 PUFF: 160; 4.5 AEROSOL RESPIRATORY (INHALATION) at 17:07

## 2021-07-11 ASSESSMENT — PULMONARY FUNCTION TESTS
PIF_VALUE: 13
PIF_VALUE: 13
PIF_VALUE: 1
PIF_VALUE: 14
PIF_VALUE: 1
PIF_VALUE: 13
PIF_VALUE: 14
PIF_VALUE: 16
PIF_VALUE: 13
PIF_VALUE: 1
PIF_VALUE: 1
PIF_VALUE: 15
PIF_VALUE: 14
PIF_VALUE: 13
PIF_VALUE: 1
PIF_VALUE: 15
PIF_VALUE: 13
PIF_VALUE: 17
PIF_VALUE: 13
PIF_VALUE: 1
PIF_VALUE: 13
PIF_VALUE: 13

## 2021-07-11 ASSESSMENT — PAIN SCALES - GENERAL
PAINLEVEL_OUTOF10: 4
PAINLEVEL_OUTOF10: 5
PAINLEVEL_OUTOF10: 2
PAINLEVEL_OUTOF10: 0

## 2021-07-11 NOTE — PLAN OF CARE
I have reviewed the patient's plan of care and based on this review, the patient treatment plan has been updated. Problem: Falls - Risk of:  Goal: Will remain free from falls  Description: Will remain free from falls  Outcome: Ongoing  Goal: Absence of physical injury  Description: Absence of physical injury  Outcome: Ongoing  Note: Patient verbalizes understanding of fall precautions, uses call light appropriately. Problem: DISCHARGE BARRIERS  Goal: Patient's continuum of care needs are met  Outcome: Ongoing  Note: Working toward goal of discharge to home. Problem: Infection - Methicillin-Resistant Staphylococcus Aureus Infection:  Goal: Absence of methicillin-resistant Staphylococcus aureus infection  Description: Absence of methicillin-resistant Staphylococcus aureus infection  Outcome: Ongoing     Problem: Pain:  Goal: Pain level will decrease  Description: Pain level will decrease  Outcome: Ongoing  Note: Patient satisfied with pain control.

## 2021-07-11 NOTE — ANESTHESIA PRE PROCEDURE
Department of Anesthesiology  Preprocedure Note       Name:  Orquidea Montalvo   Age:  82 y.o.  :  1935                                          MRN:  [id number]         Date:  2021      Surgeon: Carmen Sutton):  Iban Remy MD    Procedure: Procedure(s):  LEFT ABDOMEN WALL WOUND DEBRIDEMENT AND REPAIR    Medications prior to admission:   Prior to Admission medications    Medication Sig Start Date End Date Taking? Authorizing Provider   candesartan (ATACAND) 16 MG tablet Take 16 mg by mouth 2 times daily   Yes Historical Provider, MD   vitamin B-12 (CYANOCOBALAMIN) 100 MCG tablet Take 1,000 mcg by mouth daily   Yes Historical Provider, MD   polyethylene glycol (GLYCOLAX) 17 g packet Take 17 g by mouth daily as needed for Constipation   Yes Historical Provider, MD   triamcinolone (KENALOG) 0.1 % cream Apply 1 each topically every 12 hours as needed (rash) Apply topically 2 times daily.    Yes Historical Provider, MD   Cholecalciferol (VITAMIN D) 50 MCG (2000 UT) CAPS capsule Take 2,000 Units by mouth daily   Yes Historical Provider, MD Pozo Niprenan 18 MCG inhalation capsule INHALE THE CONTENTS OF ONE CAPSULE BY MOUTH ONCE DAILY AS DIRECTED 12/15/16  Yes TREVA Tai CNP   docusate sodium (COLACE) 100 MG capsule TAKE ONE (1) CAPSULE BY MOUTH TWICE DAILY  Patient taking differently: Take 50 mg by mouth every 8 hours as needed for Constipation  10/20/16  Yes Berta Bonilla MD   XARELTO 20 MG TABS tablet TAKE ONE (1) TABLET BY MOUTH EACH EVENING WITH MEAL 10/20/16  Yes Berta Bonilla MD   albuterol sulfate HFA (PROAIR HFA) 108 (90 BASE) MCG/ACT inhaler INHALE 2 PUFFS BY MOUTH EVERY 4 HOURS AS NEEDED 16  Yes TREVA Tai CNP   LANOXIN 125 MCG tablet Take 1 tablet by mouth daily 16  Yes TREVA Tai CNP   traZODone (DESYREL) 150 MG tablet TAKE (1) TABLET BY MOUTH NIGHTLY AS NEEDED FOR SLEEP  Patient taking acetaminophen (TYLENOL) tablet 650 mg  650 mg Oral Q4H PRN Fabrizio Pina MD   650 mg at 07/10/21 2301    ondansetron (ZOFRAN) injection 4 mg  4 mg Intravenous Q6H PRN Fabrizio Pina MD        albuterol (PROVENTIL) nebulizer solution 2.5 mg  2.5 mg Nebulization Q6H PRN Fabrizio Pina MD        dilTIAZem (CARDIZEM CD) extended release capsule 120 mg  120 mg Oral Daily Ramin Asif MD   120 mg at 07/10/21 0925    ipratropium-albuterol (DUONEB) nebulizer solution 1 ampule  1 ampule Inhalation Q4H PRN Fabrizio Pina MD        latanoprost (XALATAN) 0.005 % ophthalmic solution 1 drop  1 drop Both Eyes Nightly Fabrizio Pina MD   1 drop at 07/10/21 2056    digoxin (LANOXIN) tablet 125 mcg  125 mcg Oral Daily Fabrizio Pina MD   125 mcg at 07/10/21 0925    losartan (COZAAR) tablet 50 mg  50 mg Oral Nightly Fabrizio Pina MD   50 mg at 07/10/21 2056    budesonide-formoterol (SYMBICORT) 160-4.5 MCG/ACT inhaler 2 puff  2 puff Inhalation BID Fabrizio Pina MD   2 puff at 07/10/21 1808    [Held by provider] rivaroxaban (XARELTO) tablet 20 mg  20 mg Oral Daily Fabrizio Pina MD   20 mg at 07/08/21 1850       Allergies: Allergies   Allergen Reactions    Metolazone      Causes hyponatremia (with high urine sodium)    Thiazide-Type Diuretics      Causes hyponatremia (with high urine sodium)       Problem List:    Patient Active Problem List   Diagnosis Code    COPD (chronic obstructive pulmonary disease) with acute bronchitis (HCC) J44.0, J20.9    Encephalopathy acute G93.40    Muscular deconditioning R29.898    Constipation, chronic K59.09    Dermatitis L30.9    COPD (chronic obstructive pulmonary disease) ([facility] J44.9    COPD with exacerbation ([facility] J44.1    Obesity (BMI 30-39. 9) E66.9    COPD with exacerbation (HCC) J44.1    Acute bronchitis J20.9    Hypertension I10    Atrial fibrillation (HCC) I48.91    SSS (sick sinus syndrome) ([facility]) I49.5    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[facility]) I49.5    Atrial fibrillation, chronic ([facility]) I48.20    S/P cardiac pacemaker procedure Z95.0    NSTEMI (non-ST elevated myocardial infarction) ([facility]) I21.4    Respiratory failure with hypercapnia ([facility]) J96.92    Pneumonia J18.9    Pulmonary edema cardiac cause ([facility]) I50.1    Respiratory distress R06.03    COPD exacerbation ([facility]) J44.1    CHF (congestive heart failure) ([facility]) H50.9    Systolic CHF, acute on chronic ([facility]) I50.23    Hyperglycemia R73.9    Hyponatremia E87.1       Past Medical History:        Diagnosis Date    Arthritis     Asthma     Atrial fibrillation ([facility])     CHF (congestive heart failure) ([facility])     COPD (chronic obstructive pulmonary disease) ([facility])     Encephalopathy acute 11/20/2013    Hyperlipidemia     Hypertension     Muscular deconditioning 12/23/2013    Pneumonia        Past Surgical History:        Procedure Laterality Date    ABDOMEN SURGERY      EKG 12-LEAD  8/5/2015         FRACTURE SURGERY  unsure    [facility]    OTHER SURGICAL HISTORY  10-30-13    abdominal exploration, closure of evisceration with retention sutures-Dr. Lora Boss     OTHER SURGICAL HISTORY  10-25-13    Placement of gastrostomy tube-Dr. Lora Boss        Social History:    Social History     Tobacco Use    Smoking status: Former Smoker     Packs/day: 2.00     Years: 30.00     Pack years: 60.00     Types: Cigarettes     Start date: 12/17/1978    Smokeless tobacco: Never Used   Substance Use Topics    Alcohol use: No     Comment: 1 time a week                                Counseling given: Not Answered      Vital Signs (Current):   Vitals:    07/10/21 1300 07/10/21 1634 07/10/21 2045 07/11/21 0551   BP: (!) 120/55 109/69 (!) 151/58 131/60   Pulse: 57 60 69 66   Resp: 18 18 18 16   Temp: 97.8 °F (36.6 °C) 97.9 °F (36.6 °C) 97.5 °F (36.4 °C) 97.6 °F (36.4 °C)   TempSrc: Oral Oral Oral Oral   SpO2: 95% 97% 98% 98%   Weight:       Height: BP Readings from Last 3 Encounters:   07/11/21 131/60   02/23/17 130/80   11/13/16 (!) 150/100       NPO Status:                                                                                 BMI:   Wt Readings from Last 3 Encounters:   07/07/21 233 lb 7.5 oz (105.9 kg)   02/23/17 262 lb (118.8 kg)   11/13/16 260 lb (117.9 kg)     Body mass index is 30.8 kg/m². CBC:   Lab Results   Component Value Date    WBC 9.9 07/11/2021    RBC 3.28 07/11/2021    HGB 10.3 07/11/2021    HCT 32.0 07/11/2021    MCV 97.6 07/11/2021    RDW 14.3 01/27/2017     07/11/2021       CMP:   Lab Results   Component Value Date     07/10/2021    K 4.6 07/10/2021    K 4.8 07/07/2021    CL 92 07/10/2021    CO2 23 07/10/2021    BUN 13 07/10/2021    CREATININE 0.7 07/10/2021    LABGLOM >90 07/10/2021    GLUCOSE 93 07/10/2021    PROT 7.0 01/27/2017    CALCIUM 9.3 07/10/2021    BILITOT 0.3 01/27/2017    ALKPHOS 52 01/27/2017    AST 25 01/27/2017    ALT 25 01/27/2017       POC Tests: No results for input(s): POCGLU, POCNA, POCK, POCCL, POCBUN, POCHEMO, POCHCT in the last 72 hours.     Coags:   Lab Results   Component Value Date    INR 1.06 11/05/2013       HCG (If Applicable): No results found for: PREGTESTUR, PREGSERUM, HCG, HCGQUANT     ABGs: No results found for: PHART, PO2ART, UHF9WZH, ROZ4NIF, BEART, M7WABYXS     Type & Screen (If Applicable):  No results found for: LABABO, LABRH    Drug/Infectious Status (If Applicable):  No results found for: HIV, HEPCAB    COVID-19 Screening (If Applicable): No results found for: COVID19        Anesthesia Evaluation  Patient summary reviewed and Nursing notes reviewed no history of anesthetic complications:   Airway: Mallampati: III  TM distance: >3 FB   Neck ROM: full  Mouth opening: > = 3 FB Dental:    (+) edentulous      Pulmonary:   (+) pneumonia: resolved,  COPD:  decreased breath sounds,  asthma:                            Cardiovascular:  Exercise tolerance: poor (<4 METS),   (+) hypertension:, pacemaker: pacemaker, past MI: > 6 months, dysrhythmias: atrial fibrillation, CHF: systolic,       ECG reviewed                        Neuro/Psych:   (+) neuromuscular disease:,             GI/Hepatic/Renal: Neg GI/Hepatic/Renal ROS            Endo/Other: Negative Endo/Other ROS             Pt had no PAT visit       Abdominal:             Vascular: negative vascular ROS. Other Findings:             Anesthesia Plan      general     ASA 4       Induction: intravenous. Anesthetic plan and risks discussed with patient and child/children. Plan discussed with CRNA.                   Parish Montiel DO   7/11/2021

## 2021-07-11 NOTE — ANESTHESIA POSTPROCEDURE EVALUATION
Department of Anesthesiology  Postprocedure Note    Patient: Lucille Prescott  MRN: [id number]  YOB: 1935  Date of evaluation: 7/11/2021  Time:  12:53 PM     Procedure Summary     Date: 07/11/21 Room / Location: [place] [address] / [facility]    Anesthesia Start: 0736 Anesthesia Stop: 1384    Procedure: LEFT ABDOMEN WALL WOUND DEBRIDEMENT AND REPAIR (N/A Abdomen) Diagnosis: (ABD wound)    Surgeons: Yaya Caal MD Responsible Provider: Puma Roman DO    Anesthesia Type: general ASA Status: 4          Anesthesia Type: general    Елена Phase I: Елена Score: 10    Елена Phase II:      Last vitals: Reviewed and per EMR flowsheets.        Anesthesia Post Evaluation    Patient location during evaluation: PACU  Patient participation: complete - patient participated  Level of consciousness: awake and alert  Pain score: 2  Airway patency: patent  Nausea & Vomiting: no nausea and no vomiting  Complications: no  Cardiovascular status: hemodynamically stable and blood pressure returned to baseline  Respiratory status: spontaneous ventilation, room air and acceptable  Hydration status: stable

## 2021-07-11 NOTE — PROGRESS NOTES
INTERNAL MEDICINE Progress Note  7/11/2021 1:08 PM  Subjective:   Admit Date: 7/7/2021  PCP: Abraham Banda MD  Interval History: seen,   Had excisional debridement of abd wound today  Post op abd pain+    Objective:   Vitals: /88   Pulse 59   Temp 99.1 °F (37.3 °C) (Temporal)   Resp 18   Ht 6' 1\" (1.854 m)   Wt 233 lb 7.5 oz (105.9 kg)   SpO2 98%   BMI 30.80 kg/m²   General appearance: alert and cooperative with exam  HEENT:  atraumatic  Neck:  no JVD  Lungs: clear to auscultation bilaterally  Heart: S1, S2 normal  Abdomen: soft, distended, BS+, dressing to left side abd  Extremities: no edema,   Neurologic: alert, oriented x3, thought content appropriate      Medications:   Scheduled Meds:   sodium chloride flush  5-40 mL Intravenous 2 times per day    [START ON 7/12/2021] enoxaparin  40 mg Subcutaneous Daily    clindamycin (CLEOCIN) IV  600 mg Intravenous Q8H    docusate sodium  100 mg Oral BID    cloNIDine  0.2 mg Oral BID    metoprolol tartrate  50 mg Oral Q12H    pantoprazole  40 mg Oral Daily    dilTIAZem  120 mg Oral Daily    latanoprost  1 drop Both Eyes Nightly    digoxin  125 mcg Oral Daily    losartan  50 mg Oral Nightly    budesonide-formoterol  2 puff Inhalation BID    [Held by provider] rivaroxaban  20 mg Oral Daily     Continuous Infusions:   sodium chloride      sodium chloride         Lab Results:   CBC:   Recent Labs     07/09/21  0823 07/10/21  0713 07/11/21  0627   WBC 11.8* 11.6* 9.9   HGB 10.7* 10.5* 10.3*    260 233     BMP:    Recent Labs     07/09/21  0823 07/10/21  0713 07/11/21  0627   * 127* 124*   K 4.5 4.6 5.0   CL 90* 92* 93*   CO2 27 23 21*   BUN 10 13 16   CREATININE 0.8 0.7 0.7   GLUCOSE 120* 93 106     Magnesium:    Lab Results   Component Value Date    MG 1.9 11/13/2016     TSH:    Lab Results   Component Value Date    TSH 1.540 07/07/2021     Organism Abnormal  07/07/2021  4:30 PM  - [facility]   Staphylococcus aureus Aerobic Culture 07/07/2021  4:30  [facility]   heavy growth This is a MRSA (Methicillin Resistant Staphylococcus aureus)isolate. Isolates of MRSA (ORSA) Methicillin (Oxacillin) Resistant Staphylococcus aureus (coagulase positive) require patient be placed in CONTACT isolation. Methicillin(Oxacillin)resistant strains of staphylococci (MRSA)or(MRSE)should be considered resistant to all classes of cephalosporins, penems and beta-lactams. In the treatment of gram positive infections, GENTAMICIN should be CONSIDERED a SYNERGYSTIC agent ONLY. Performed by: [facility]  Source: abdomen       Site: swab left lower          Current Antibiotics: not stated   Susceptibility  Staphylococcus aureus (1)  Antibiotic Interpretation ROSA Status    gentamicin Sensitive <=0.5 mcg/mL Final    ciprofloxacin Resistant >=8 mcg/mL Final    oxacillin Resistant >=4 mcg/mL Final    ICR (D test) Negative Neg  mcg/mL Final     (Dtest) ICR- inducible clinda resistance    If +, then inducible erm gene       present. Clindamycin may be       effective in some patients.       trimethoprim-sulfamethoxazole Sensitive <=10 mcg/mL Final    clindamycin Sensitive <=0.25 mcg/mL Final    tetracycline Sensitive <=1 mcg/mL Final        Assessment and Plan:   1. Abscess left abd wall s/p I/D  2. Cellulitis abd wall  3. Hyponatremia-persistent despite fluid restiction  4. HTN  5. A fib  6.  COPD     Cont wound care,  Cont IV clindamycin  Hyponatremia- hold prozac, restrict free water 1L per day, Asad Candelario MD, MD

## 2021-07-11 NOTE — BRIEF OP NOTE
Brief Postoperative Note      Patient: Jonatan Prescott  YOB: 1935  MRN: [id number]    Date of Procedure: 7/11/2021    Pre-Op Diagnosis: Left lower quadrant abdominal wall open necrotic wound    Post-Op Diagnosis: Same       Procedure: Excisional debridement skin and subcutaneous tissue left lower quadrant abdominal wall open necrotic wound (5 x 5 x 2 cm)    Surgeon(s):  Michelle Estrada MD    Assistant:  * No surgical staff found *    Anesthesia: General    Estimated Blood Loss (mL): 13MK    Complications: None    Specimens:   ID Type Source Tests Collected by Time Destination   1 : Abdominal Wall Abcess Tissue Abdomen CULTURE, ANAEROBIC AND AEROBIC (Canceled) Michelle Estrada MD 7/11/2021 0754        Implants:  * No implants in log *      Drains: * No LDAs found *    Findings: as above - see op note for details    Electronically signed by Michelle Estrada MD on 7/11/2021 at 8:14 AM

## 2021-07-11 NOTE — OP NOTE
[address] [place] [address]                                OPERATIVE REPORT    PATIENT NAME: Nasima Baig                    :        1935  MED REC NO:   [id number]                           ROOM:       0016  ACCOUNT NO:   [de-identified]                           ADMIT DATE: 2021  PROVIDER:     KATIE Avendaño Bile:  2021    PREOPERATIVE DIAGNOSIS:  Left lower quadrant abdominal wall open  necrotic wound. POSTOPERATIVE DIAGNOSIS:  Left lower quadrant abdominal wall open  necrotic wound. PROCEDURES:  Excisional debridement of skin and subcutaneous tissue,  left lower quadrant abdominal wall open necrotic wound (5 x 5 x 2 cm in  diameter). SURGEON:  Gerardo Barakat MD    ANESTHESIA:  General.    ESTIMATED BLOOD LOSS:  10 mL. DRAINS:  None. COMPLICATIONS:  None. DISPOSITION:  Stable to the recovery room. INDICATIONS:  The patient is an 77-year-old gentleman who I was asked to  see secondary to an open necrotic left lower quadrant abdominal wall  wound. He is in need of debridement. Both operative and nonoperative  intervention plans were discussed. Risks of surgery were further  discussed. Some of the risks included but were not limited to bleeding,  infection, the need for reoperation, severe chronic postoperative pain  or numbness, major vascular or nerve injury, cardiopulmonary  complications, anesthetic complications, seroma/hematoma formation,  failure of the wound to heal, chronic pain, and death. After all of the  questions were answered in their entirety and the patient was completely  aware of the current situation, he elected to proceed with the  procedure. DESCRIPTION OF THE PROCEDURE:  After informed consent was signed and  placed on the chart, the patient was taken back to the operating room  and placed supine on the operating room table.   General anesthesia was  induced. He tolerated this throughout the case. All pressure points  were padded. He was on preoperative antibiotics. Bilateral lower  extremity sequential compression devices were placed prior to incision. His abdomen and pelvis were prepped and draped in the usual sterile  standard fashion. A timeout occurred prior to the operation, which not  only identified the patient, but also the planned procedure to be  performed. At the end of the timeout, there were no questions or  concerns. I began the operation first by excisionally debriding the  necrotic skin and subcutaneous tissue with the 10-blade scalpel and  electrocautery. Overall debrided diameter was 5 x 5 x 2 cm. Tissue was  sent for aerobic and anaerobic cultures. Irrigation with Irrisept. Hemostasis was obtained with electrocautery. Reevaluation of the wound  demonstrated no further necrotic tissue. Rest of the tissues overall  seemed viable. This was then packed with Dakin-soaked gauze/Kerlix. Dry sterile dressing applied. Sponge, needle, and instrumentation count  was correct at the end of the procedure. The patient tolerated the  procedure well with no apparent complications and less than 10 mL of  blood loss. He was brought out of general anesthesia and then  transferred to the postanesthesia care unit in stable condition.         Frank Lazaro M.D.    D: 07/11/2021 8:23:48       T: 07/11/2021 9:40:48     HUA/ELA_ALETA  Job#: [id number]     Doc#: [id number]    CC:

## 2021-07-12 LAB
AEROBIC CULTURE: ABNORMAL
ANAEROBIC CULTURE: ABNORMAL
ANION GAP SERPL CALCULATED.3IONS-SCNC: 11 MEQ/L (ref 8–16)
BUN BLDV-MCNC: 16 MG/DL (ref 7–22)
CALCIUM SERPL-MCNC: 9.4 MG/DL (ref 8.5–10.5)
CHLORIDE BLD-SCNC: 95 MEQ/L (ref 98–111)
CO2: 26 MEQ/L (ref 23–33)
CREAT SERPL-MCNC: 0.7 MG/DL (ref 0.4–1.2)
ERYTHROCYTE [DISTWIDTH] IN BLOOD BY AUTOMATED COUNT: 13.3 % (ref 11.5–14.5)
ERYTHROCYTE [DISTWIDTH] IN BLOOD BY AUTOMATED COUNT: 45.3 FL (ref 35–45)
GFR SERPL CREATININE-BSD FRML MDRD: > 90 ML/MIN/1.73M2
GLUCOSE BLD-MCNC: 128 MG/DL (ref 70–108)
GRAM STAIN RESULT: ABNORMAL
HCT VFR BLD CALC: 32.7 % (ref 42–52)
HEMOGLOBIN: 10.9 GM/DL (ref 14–18)
MCH RBC QN AUTO: 31.2 PG (ref 26–33)
MCHC RBC AUTO-ENTMCNC: 33.3 GM/DL (ref 32.2–35.5)
MCV RBC AUTO: 93.7 FL (ref 80–94)
ORGANISM: ABNORMAL
PLATELET # BLD: 276 THOU/MM3 (ref 130–400)
PMV BLD AUTO: 10.1 FL (ref 9.4–12.4)
POTASSIUM REFLEX MAGNESIUM: 4.9 MEQ/L (ref 3.5–5.2)
RBC # BLD: 3.49 MILL/MM3 (ref 4.7–6.1)
SODIUM BLD-SCNC: 132 MEQ/L (ref 135–145)
WBC # BLD: 17.2 THOU/MM3 (ref 4.8–10.8)

## 2021-07-12 PROCEDURE — 85027 COMPLETE CBC AUTOMATED: CPT

## 2021-07-12 PROCEDURE — 94760 N-INVAS EAR/PLS OXIMETRY 1: CPT

## 2021-07-12 PROCEDURE — 36415 COLL VENOUS BLD VENIPUNCTURE: CPT

## 2021-07-12 PROCEDURE — 94640 AIRWAY INHALATION TREATMENT: CPT

## 2021-07-12 PROCEDURE — 6370000000 HC RX 637 (ALT 250 FOR IP): Performed by: SURGERY

## 2021-07-12 PROCEDURE — 6360000002 HC RX W HCPCS: Performed by: SURGERY

## 2021-07-12 PROCEDURE — 6370000000 HC RX 637 (ALT 250 FOR IP): Performed by: INTERNAL MEDICINE

## 2021-07-12 PROCEDURE — 1200000003 HC TELEMETRY R&B

## 2021-07-12 PROCEDURE — 99024 POSTOP FOLLOW-UP VISIT: CPT | Performed by: NURSE PRACTITIONER

## 2021-07-12 PROCEDURE — APPSS45 APP SPLIT SHARED TIME 31-45 MINUTES: Performed by: NURSE PRACTITIONER

## 2021-07-12 PROCEDURE — 2500000003 HC RX 250 WO HCPCS: Performed by: SURGERY

## 2021-07-12 PROCEDURE — 80048 BASIC METABOLIC PNL TOTAL CA: CPT

## 2021-07-12 RX ADMIN — DOCUSATE SODIUM 100 MG: 100 CAPSULE, LIQUID FILLED ORAL at 09:40

## 2021-07-12 RX ADMIN — BUDESONIDE AND FORMOTEROL FUMARATE DIHYDRATE 2 PUFF: 160; 4.5 AEROSOL RESPIRATORY (INHALATION) at 17:54

## 2021-07-12 RX ADMIN — METOPROLOL TARTRATE 50 MG: 50 TABLET, FILM COATED ORAL at 22:10

## 2021-07-12 RX ADMIN — CLONIDINE HYDROCHLORIDE 0.2 MG: 0.2 TABLET ORAL at 22:10

## 2021-07-12 RX ADMIN — RIVAROXABAN 20 MG: 20 TABLET, FILM COATED ORAL at 17:38

## 2021-07-12 RX ADMIN — CLONIDINE HYDROCHLORIDE 0.2 MG: 0.2 TABLET ORAL at 09:40

## 2021-07-12 RX ADMIN — CLINDAMYCIN PHOSPHATE 600 MG: 600 INJECTION, SOLUTION INTRAVENOUS at 19:35

## 2021-07-12 RX ADMIN — TRAZODONE HYDROCHLORIDE 150 MG: 100 TABLET ORAL at 22:10

## 2021-07-12 RX ADMIN — Medication 15 MG: at 09:41

## 2021-07-12 RX ADMIN — CLINDAMYCIN PHOSPHATE 600 MG: 600 INJECTION, SOLUTION INTRAVENOUS at 13:51

## 2021-07-12 RX ADMIN — BUDESONIDE AND FORMOTEROL FUMARATE DIHYDRATE 2 PUFF: 160; 4.5 AEROSOL RESPIRATORY (INHALATION) at 07:55

## 2021-07-12 RX ADMIN — DOCUSATE SODIUM 50 MG: 50 LIQUID ORAL at 09:41

## 2021-07-12 RX ADMIN — LOSARTAN POTASSIUM 50 MG: 50 TABLET, FILM COATED ORAL at 22:10

## 2021-07-12 RX ADMIN — ENOXAPARIN SODIUM 40 MG: 40 INJECTION, SOLUTION INTRAVENOUS; SUBCUTANEOUS at 09:41

## 2021-07-12 RX ADMIN — MAGNESIUM HYDROXIDE 30 ML: 2400 SUSPENSION ORAL at 03:33

## 2021-07-12 RX ADMIN — LATANOPROST 1 DROP: 50 SOLUTION OPHTHALMIC at 22:10

## 2021-07-12 RX ADMIN — POLYETHYLENE GLYCOL 3350 17 G: 17 POWDER, FOR SOLUTION ORAL at 09:40

## 2021-07-12 RX ADMIN — DILTIAZEM HYDROCHLORIDE 120 MG: 120 CAPSULE, EXTENDED RELEASE ORAL at 09:40

## 2021-07-12 RX ADMIN — DIGOXIN 125 MCG: 250 TABLET ORAL at 09:41

## 2021-07-12 RX ADMIN — DOCUSATE SODIUM 100 MG: 100 CAPSULE, LIQUID FILLED ORAL at 22:10

## 2021-07-12 RX ADMIN — FLUTICASONE PROPIONATE 1 SPRAY: 50 SPRAY, METERED NASAL at 22:10

## 2021-07-12 RX ADMIN — CLINDAMYCIN PHOSPHATE 600 MG: 600 INJECTION, SOLUTION INTRAVENOUS at 03:29

## 2021-07-12 RX ADMIN — METOPROLOL TARTRATE 50 MG: 50 TABLET, FILM COATED ORAL at 05:48

## 2021-07-12 RX ADMIN — PANTOPRAZOLE SODIUM 40 MG: 40 TABLET, DELAYED RELEASE ORAL at 09:40

## 2021-07-12 ASSESSMENT — PAIN SCALES - GENERAL
PAINLEVEL_OUTOF10: 0
PAINLEVEL_OUTOF10: 2

## 2021-07-12 NOTE — CARE COORDINATION
7/12/21, 3:07 PM EDT    DISCHARGE PLANNING EVALUATION      Spoke with [facility] admissions. Lavanda Sandhoff will review for return to assisted living, may recommend that patient go to skilled under medicaid while wound needs daily packing. Waiting call back with decision from Lavanda Sandhoff.

## 2021-07-12 NOTE — PROGRESS NOTES
INTERNAL MEDICINE Progress Note  7/12/2021 6:46 PM  Subjective:   Admit Date: 7/7/2021  PCP: Asia Saxena MD  Interval History: seen,     Doing better today  No new c/o     Objective:   Vitals: BP (!) 107/52   Pulse 64   Temp 97.4 °F (36.3 °C) (Oral)   Resp 18   Ht 6' 1\" (1.854 m)   Wt 233 lb 7.5 oz (105.9 kg)   SpO2 99%   BMI 30.80 kg/m²   General appearance: alert and cooperative with exam  HEENT:  atraumatic  Neck:  no JVD  Lungs: clear to auscultation bilaterally  Heart: S1, S2 normal  Abdomen: soft, distended, BS+, dressing to left side abd  Extremities: no edema,   Neurologic: alert, oriented x3, thought content appropriate      Medications:   Scheduled Meds:   sodium chloride flush  5-40 mL Intravenous 2 times per day    polyethylene glycol  17 g Oral Daily    clindamycin (CLEOCIN) IV  600 mg Intravenous Q8H    docusate sodium  100 mg Oral BID    cloNIDine  0.2 mg Oral BID    metoprolol tartrate  50 mg Oral Q12H    pantoprazole  40 mg Oral Daily    dilTIAZem  120 mg Oral Daily    latanoprost  1 drop Both Eyes Nightly    digoxin  125 mcg Oral Daily    losartan  50 mg Oral Nightly    budesonide-formoterol  2 puff Inhalation BID    rivaroxaban  20 mg Oral Daily     Continuous Infusions:   sodium chloride      sodium chloride 50 mL/hr at 07/11/21 1648       Lab Results:   CBC:   Recent Labs     07/10/21  0713 07/11/21  0627 07/12/21  0730   WBC 11.6* 9.9 17.2*   HGB 10.5* 10.3* 10.9*    233 276     BMP:    Recent Labs     07/10/21  0713 07/11/21  0627 07/12/21  0730   * 124* 132*   K 4.6 5.0 4.9   CL 92* 93* 95*   CO2 23 21* 26   BUN 13 16 16   CREATININE 0.7 0.7 0.7   GLUCOSE 93 106 128*     Magnesium:    Lab Results   Component Value Date    MG 1.9 11/13/2016     TSH:    Lab Results   Component Value Date    TSH 1.540 07/07/2021     Organism Abnormal  07/07/2021  4:30  [facility]   Staphylococcus aureus    Aerobic Culture 07/07/2021  4:30 PM [facility]   heavy growth This is a MRSA (Methicillin Resistant Staphylococcus aureus)isolate. Isolates of MRSA (ORSA) Methicillin (Oxacillin) Resistant Staphylococcus aureus (coagulase positive) require patient be placed in CONTACT isolation. Methicillin(Oxacillin)resistant strains of staphylococci (MRSA)or(MRSE)should be considered resistant to all classes of cephalosporins, penems and beta-lactams. In the treatment of gram positive infections, GENTAMICIN should be CONSIDERED a SYNERGYSTIC agent ONLY. Performed by: [facility]  Source: abdomen       Site: swab left lower          Current Antibiotics: not stated   Susceptibility  Staphylococcus aureus (1)  Antibiotic Interpretation ROSA Status    gentamicin Sensitive <=0.5 mcg/mL Final    ciprofloxacin Resistant >=8 mcg/mL Final    oxacillin Resistant >=4 mcg/mL Final    ICR (D test) Negative Neg  mcg/mL Final     (Dtest) ICR- inducible clinda resistance    If +, then inducible erm gene       present. Clindamycin may be       effective in some patients.       trimethoprim-sulfamethoxazole Sensitive <=10 mcg/mL Final    clindamycin Sensitive <=0.25 mcg/mL Final    tetracycline Sensitive <=1 mcg/mL Final        Assessment and Plan:   1. Abscess left abd wall s/p I/D  2. Cellulitis abd wall  3. Hyponatremia-persistent despite fluid restiction  4. HTN  5. A fib  6.  COPD     Cont wound care,  Cont IV clindamycin, noted elevated wbc today, repeat cbc am  Improved Na with samsca x 1 today  Am labs  PT/OT    Asia Saxena MD, MD

## 2021-07-12 NOTE — PROGRESS NOTES
Progress note: Infectious diseases    Patient - Alireza Williamson,  Age - 82 y.o.    - 1935      Room Number - 7K-16/016-A   MRN -  [id number]   Acct # - [de-identified]  Date of Admission -  2021 11:58 AM    SUBJECTIVE:   He has no new complaints. He had I and D of the wound. OBJECTIVE   VITALS    height is 6' 1\" (1.854 m) and weight is 233 lb 7.5 oz (105.9 kg). His oral temperature is 97.7 °F (36.5 °C). His blood pressure is 138/66 and his pulse is 65. His respiration is 18 and oxygen saturation is 97%.        Wt Readings from Last 3 Encounters:   21 233 lb 7.5 oz (105.9 kg)   17 262 lb (118.8 kg)   16 260 lb (117.9 kg)       I/O (24 Hours)    Intake/Output Summary (Last 24 hours) at 2021 0813  Last data filed at 2021 0329  Gross per 24 hour   Intake 1325 ml   Output 1900 ml   Net -575 ml       General Appearance  Awake, alert, oriented,  not  In acute distress  HEENT - normocephalic, atraumatic, pink conjunctiva,  anicteric sclera  Neck - Supple, no mass  Lungs -  Bilateral   air entry, no rhonchi, no wheeze  Cardiovascular - Heart sounds are normal.    Abdomen - dressed abdominal wound,   Neurologic -oriented  Skin - No bruising or bleeding  Extremities - No edema, no cyanosis, clubbing     MEDICATIONS:      sodium chloride flush  5-40 mL Intravenous 2 times per day    enoxaparin  40 mg Subcutaneous Daily    tolvaptan  15 mg Oral Daily    polyethylene glycol  17 g Oral Daily    clindamycin (CLEOCIN) IV  600 mg Intravenous Q8H    docusate sodium  100 mg Oral BID    cloNIDine  0.2 mg Oral BID    metoprolol tartrate  50 mg Oral Q12H    pantoprazole  40 mg Oral Daily    dilTIAZem  120 mg Oral Daily    latanoprost  1 drop Both Eyes Nightly    digoxin  125 mcg Oral Daily    losartan  50 mg Oral Nightly    budesonide-formoterol  2 puff Inhalation BID    [Held by provider] rivaroxaban  20 mg Oral Daily      sodium chloride      sodium chloride 50 mL/hr at 07/11/21 1648     sodium chloride flush, sodium chloride, ondansetron **OR** ondansetron, morphine **OR** morphine, HYDROcodone 5 mg - acetaminophen **OR** HYDROcodone 5 mg - acetaminophen, sodium phosphate, ammonium lactate, traZODone, fluticasone, bisacodyl, docusate sodium, magnesium hydroxide, acetaminophen, ondansetron, albuterol, ipratropium-albuterol      LABS:     CBC:   Recent Labs     07/10/21  0713 07/11/21  0627 07/12/21  0730   WBC 11.6* 9.9 17.2*   HGB 10.5* 10.3* 10.9*    233 276     BMP:    Recent Labs     07/10/21  0713 07/11/21  0627 07/12/21  0730   * 124* 132*   K 4.6 5.0 4.9   CL 92* 93* 95*   CO2 23 21* 26   BUN 13 16 16   CREATININE 0.7 0.7 0.7   GLUCOSE 93 106 128*     Calcium:  Recent Labs     07/12/21  0730   CALCIUM 9.4         Problem list of patient:     Patient Active Problem List   Diagnosis Code    COPD (chronic obstructive pulmonary disease) with acute bronchitis ([facility]) J44.0, J20.9    Encephalopathy acute G93.40    Muscular deconditioning R29.898    Constipation, chronic K59.09    Dermatitis L30.9    [facility] obstructive [facility] disease) [facility] J44.9    COPD with exacerbation ([facility]) J44.1    Obesity (BMI 30-39. 9) E66.9    COPD with exacerbation ([facility]) J44.1    Acute bronchitis J20.9    Hypertension I10    Atrial fibrillation ([facility]) I48.91    SSS (sick sinus syndrome) ([facility]) I49.5    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[facility]) I49.5    Atrial fibrillation, chronic ([facility]) I48.20    S/P cardiac pacemaker procedure Z95.0    NSTEMI (non-ST elevated myocardial infarction) ([facility]) I21.4    Respiratory failure with hypercapnia ([facility]) J96.92    Pneumonia J18.9    Pulmonary edema cardiac cause ([facility]) I50.1    Respiratory distress R06.03    COPD exacerbation ([facility]) J44.1    CHF (congestive heart failure) ([facility]) H00.9    Systolic CHF, acute on chronic ([facility]) I50.23    Hyperglycemia R73.9    Hyponatremia E87.1    Abdominal wall abscess L02.211         ASSESSMENT/PLAN   Abdominal wall abscess due to MRSA: he had I and D. Will transition antibiotic to oral on discharge.   COPD  CHF    Marco A Merrill MD, MD, [address] 7/12/2021 8:13 AM

## 2021-07-12 NOTE — CARE COORDINATION
7/12/21, 3:00 PM EDT    [address] day: 5  Location: [facility] Reason for admit: Hyponatremia [E87.1]   Procedure: 7/11/21 surgery by Dr Tyesha Navarro: Excisional debridement skin and subcutaneous tissue left lower quadrant abdominal wall open necrotic wound   Barriers to Discharge: Dr Jaya Jenkins admitted and attending. Dr Tyesha Navarro consulted to eval abdominal wound. Surgery, as above. Pack wound daily with Dakin soaked gauze. ID consult: MRSA infection, abd wall. Telemetry monitoring. PCP: Suzette Romo MD  Readmission Risk Score: 16%  Patient Goals/Plan/Treatment Preferences: From [address] [place]  See SW notes

## 2021-07-12 NOTE — PROGRESS NOTES
[facility] - Dr. Ayesha Mahmood   Daily Progress Note    Pt Name: Florencio Castrejon Record Number: [id number]  Date of Birth 1935   Today's Date: 7/12/2021    Hospital day # 5     ASSESSMENT   Left abdominal wall abscess with necrosis - POD # 1 Status post excisional debridement LLQ abdominal wall open necrotic wound (5x5x2 cm)  MRSA infection   Hyponatremia  Hypertension  Atrial fibrillation  COPD  Obesity (BMI 30)   has a past medical history of Arthritis, Asthma, Atrial fibrillation ([facility] CHF (congestive heart failure) ([facility] COPD (chronic obstructive pulmonary disease) ([facility] Encephalopathy acute, Hyperlipidemia, Hypertension, Muscular deconditioning, and Pneumonia. PLAN   Tolerating diet  IV antibiotics per infectious disease. Wound care today with packing changes  Resume eliquis today. Hemoglobin stable. WBC up today. Repeat in am.   Continue medical management per admitting  Looks good today. Hopefully back to assisted living with [facility]. SUBJECTIVE   Chief complaint: Left abdominal wall open wound pain    Stable overnight. Chart reviewed. Updated by nursing staff. Afebrile. Vital signs stable. Packing wound. Less pain around wound. Packed without foul odor or any necrosis. Denies chest pain or shortness of breath. No new urinary complaints. No fever, chills or sweats. Up with assistance.   CURRENT MEDICATIONS   Scheduled Meds:   sodium chloride flush  5-40 mL Intravenous 2 times per day    polyethylene glycol  17 g Oral Daily    clindamycin (CLEOCIN) IV  600 mg Intravenous Q8H    docusate sodium  100 mg Oral BID    cloNIDine  0.2 mg Oral BID    metoprolol tartrate  50 mg Oral Q12H    pantoprazole  40 mg Oral Daily    dilTIAZem  120 mg Oral Daily    latanoprost  1 drop Both Eyes Nightly    digoxin  125 mcg Oral Daily    losartan  50 mg Oral Nightly    budesonide-formoterol  2 puff Inhalation BID    rivaroxaban  20 mg Oral Daily     Continuous Infusions:   sodium chloride      sodium chloride 50 mL/hr at 21 1648     PRN Meds:.sodium chloride flush, sodium chloride, ondansetron **OR** ondansetron, morphine **OR** morphine, HYDROcodone 5 mg - acetaminophen **OR** HYDROcodone 5 mg - acetaminophen, sodium phosphate, ammonium lactate, traZODone, fluticasone, bisacodyl, docusate sodium, magnesium hydroxide, acetaminophen, ondansetron, albuterol, ipratropium-albuterol  OBJECTIVE   CURRENT VITALS:  height is 6' 1\" (1.854 m) and weight is 233 lb 7.5 oz (105.9 kg). His oral temperature is 97.8 °F (36.6 °C). His blood pressure is 113/55 (abnormal) and his pulse is 60. His respiration is 16 and oxygen saturation is 96%. Temperature Range (24h):Temp: 97.8 °F (36.6 °C) Temp  Av.8 °F (36.6 °C)  Min: 97.6 °F (36.4 °C)  Max: 98.3 °F (36.8 °C)  BP Range (18K): Systolic (69EVN), DYA:433 , Min:113 , ECU:048     Diastolic (79YLC), AJQ:53, Min:55, Max:72    Pulse Range (24h): Pulse  Av.5  Min: 60  Max: 90  Respiration Range (24h): Resp  Av.7  Min: 16  Max: 18  Current Pulse Ox (24h):  SpO2: 96 %  Pulse Ox Range (24h):  SpO2  Av.3 %  Min: 96 %  Max: 98 %  Oxygen Amount and Delivery:    Incentive Spirometry Tx:            GENERAL: alert, cooperative, no distress  LUNGS: clear to ausculation, without wheezes, rales or rhonci  HEART: normal rate and regular rhythm  ABDOMEN: soft, obese, LLQ abdominal wall wound with packing - no bleeding or significant pain, no generalized guarding or peritoneal signs. No bleeding. Minimal drainage. NEUROLOGIC: There are no focalizing motor or sensory deficits. CN II-XII are grossly intact. EXTREMITIES: no cyanosis, no clubbing and no edema. In: 1245.5 [P.O.:740; I.V.:505.5]  Out: 1300 [Urine:1300]  Date 21 - 21 235   Shift 2322-9901 5006-8521 3568-0902 24 Hour Total   INTAKE   P.O. 100 640  740   I. V.(mL/kg/hr) 237(0.3) 268.5  505.5   Shift Total(mL/kg) 337(3.2) 908.5(8.6)  1245. 5(11.8) OUTPUT   Urine(mL/kg/hr) 700(0.8)   700   Shift Total(mL/kg) 700(6.6)   700(6.6)   Weight (kg) 105.9 105.9 105.9 105.9     LABS     Recent Labs     07/10/21  0713 07/11/21  0627 07/12/21  0730   WBC 11.6* 9.9 17.2*   HGB 10.5* 10.3* 10.9*   HCT 31.5* 32.0* 32.7*    233 276   * 124* 132*   K 4.6 5.0 4.9   CL 92* 93* 95*   CO2 23 21* 26   BUN 13 16 16   CREATININE 0.7 0.7 0.7   CALCIUM 9.3 9.1 9.4      7/12/2021  1:11 PM - [facility] Incoming Lab Results From [facility]    Specimen Information: Abdomen        Component Collected Lab   Anaerobic Culture 07/07/2021  4:30  [facility]   No anaerobes isolated- preliminary No anaerobes isolated     Gram Stain Result 07/07/2021  4:30  [facility]   Few segmented neutrophils observed. No epithelial cells observed. Moderate gram positive cocci in pairs and clusters. Organism Abnormal  07/07/2021  4:30  [facility]   Staphylococcus aureus    Aerobic Culture 07/07/2021  4:30  [facility]   heavy growth This is a MRSA (Methicillin Resistant Staphylococcus aureus)isolate. Isolates of MRSA (ORSA) Methicillin (Oxacillin) Resistant Staphylococcus aureus (coagulase positive) require patient be placed in CONTACT isolation. Methicillin(Oxacillin)resistant strains of staphylococci (MRSA)or(MRSE)should be considered resistant to all classes of cephalosporins, penems and beta-lactams. In the treatment of gram positive infections, GENTAMICIN should be CONSIDERED a SYNERGYSTIC agent ONLY. Testing Performed By    [facility] Name Director Address Valid Date Range   [facility] - [address] Saurabh Shah MD [address] [place] [address] 08/30/17 0855-Present   Narrative  Performed by: [facility]  Source: abdomen       Site: swab left lower          Current Antibiotics: not stated   Susceptibility    Staphylococcus aureus (1)    Antibiotic Interpretation ROSA Status    gentamicin Sensitive <=0.5 mcg/mL Final    ciprofloxacin Resistant >=8 mcg/mL Final    oxacillin Resistant >=4 mcg/mL Final    ICR (D test) Negative Neg  mcg/mL Final     (Dtest) ICR- inducible clinda resistance    If +, then inducible erm gene       present. Clindamycin may be       effective in some patients.       trimethoprim-sulfamethoxazole Sensitive <=10 mcg/mL Final    clindamycin Sensitive <=0.25 mcg/mL Final    tetracycline Sensitive <=1 mcg/mL Final      RADIOLOGY   none    Electronically signed by TREVA Carmen CNP on 7/12/2021 at 3:23 PM    Patient seen and examined independently by me early this AM. Above discussed and I agree with Yohannes Young CNP. See my additional comments below for updated orders and plan. Labs, cultures, and radiographs where available were reviewed. I discussed patient concerns with the patient's nurse and instructions were given. Please see our orders for the updated patient care plan. -Tolerating diet. Infectious disease following with antibiotic recommendations. Wound care and packing changes. Okay to resume Eliquis. Monitor hemoglobin and wound for any signs of oozing/bleeding. Repeat labs in a.m. Trend WBC but most likely due to surgery. Home health and social service assistance for discharge planning.     Electronically signed by Juanito Govea MD on 7/12/21 at 3:47 PM EDT

## 2021-07-12 NOTE — PLAN OF CARE
Problem: Falls - Risk of:  Goal: Will remain free from falls  Description: Will remain free from falls  Outcome: Ongoing  Goal: Absence of physical injury  Description: Absence of physical injury  Outcome: Ongoing     Problem: Impaired respiratory status  Goal: Clear lung sounds  7/12/2021 0813 by Yeni Drake RCP  Outcome: Ongoing     Problem: DISCHARGE BARRIERS  Goal: Patient's continuum of care needs are met  Outcome: Ongoing     Problem: Infection - Methicillin-Resistant Staphylococcus Aureus Infection:  Goal: Absence of methicillin-resistant Staphylococcus aureus infection  Description: Absence of methicillin-resistant Staphylococcus aureus infection  Outcome: Ongoing     Problem: Pain:  Goal: Pain level will decrease  Description: Pain level will decrease  Outcome: Ongoing  Care plan reviewed with patient and son. Patient and son   verbalize understanding of the plan of care and contribute to goal setting.

## 2021-07-13 VITALS
SYSTOLIC BLOOD PRESSURE: 133 MMHG | HEIGHT: 73 IN | RESPIRATION RATE: 18 BRPM | WEIGHT: 233.47 LBS | DIASTOLIC BLOOD PRESSURE: 63 MMHG | BODY MASS INDEX: 30.94 KG/M2 | HEART RATE: 60 BPM | TEMPERATURE: 98 F | OXYGEN SATURATION: 95 %

## 2021-07-13 LAB
ANION GAP SERPL CALCULATED.3IONS-SCNC: 13 MEQ/L (ref 8–16)
BUN BLDV-MCNC: 17 MG/DL (ref 7–22)
CALCIUM SERPL-MCNC: 9.2 MG/DL (ref 8.5–10.5)
CHLORIDE BLD-SCNC: 99 MEQ/L (ref 98–111)
CO2: 23 MEQ/L (ref 23–33)
CREAT SERPL-MCNC: 0.9 MG/DL (ref 0.4–1.2)
DIGOXIN LEVEL: 0.6 NG/ML (ref 0.5–2)
ERYTHROCYTE [DISTWIDTH] IN BLOOD BY AUTOMATED COUNT: 13.4 % (ref 11.5–14.5)
ERYTHROCYTE [DISTWIDTH] IN BLOOD BY AUTOMATED COUNT: 45.8 FL (ref 35–45)
GFR SERPL CREATININE-BSD FRML MDRD: 80 ML/MIN/1.73M2
GLUCOSE BLD-MCNC: 93 MG/DL (ref 70–108)
HCT VFR BLD CALC: 31 % (ref 42–52)
HEMOGLOBIN: 10.3 GM/DL (ref 14–18)
MCH RBC QN AUTO: 31 PG (ref 26–33)
MCHC RBC AUTO-ENTMCNC: 33.2 GM/DL (ref 32.2–35.5)
MCV RBC AUTO: 93.4 FL (ref 80–94)
PLATELET # BLD: 270 THOU/MM3 (ref 130–400)
PMV BLD AUTO: 10.3 FL (ref 9.4–12.4)
POTASSIUM SERPL-SCNC: 4.3 MEQ/L (ref 3.5–5.2)
RBC # BLD: 3.32 MILL/MM3 (ref 4.7–6.1)
SODIUM BLD-SCNC: 135 MEQ/L (ref 135–145)
WBC # BLD: 12.1 THOU/MM3 (ref 4.8–10.8)

## 2021-07-13 PROCEDURE — 6370000000 HC RX 637 (ALT 250 FOR IP): Performed by: SURGERY

## 2021-07-13 PROCEDURE — 99024 POSTOP FOLLOW-UP VISIT: CPT | Performed by: NURSE PRACTITIONER

## 2021-07-13 PROCEDURE — 80162 ASSAY OF DIGOXIN TOTAL: CPT

## 2021-07-13 PROCEDURE — 85027 COMPLETE CBC AUTOMATED: CPT

## 2021-07-13 PROCEDURE — 94640 AIRWAY INHALATION TREATMENT: CPT

## 2021-07-13 PROCEDURE — 2500000003 HC RX 250 WO HCPCS: Performed by: SURGERY

## 2021-07-13 PROCEDURE — 36415 COLL VENOUS BLD VENIPUNCTURE: CPT

## 2021-07-13 PROCEDURE — 6370000000 HC RX 637 (ALT 250 FOR IP): Performed by: INTERNAL MEDICINE

## 2021-07-13 PROCEDURE — APPSS30 APP SPLIT SHARED TIME 16-30 MINUTES: Performed by: NURSE PRACTITIONER

## 2021-07-13 PROCEDURE — 6360000002 HC RX W HCPCS: Performed by: SURGERY

## 2021-07-13 PROCEDURE — 94760 N-INVAS EAR/PLS OXIMETRY 1: CPT

## 2021-07-13 PROCEDURE — 2580000003 HC RX 258: Performed by: SURGERY

## 2021-07-13 PROCEDURE — 80048 BASIC METABOLIC PNL TOTAL CA: CPT

## 2021-07-13 RX ORDER — HYDROCODONE BITARTRATE AND ACETAMINOPHEN 5; 325 MG/1; MG/1
1 TABLET ORAL EVERY 4 HOURS PRN
Qty: 10 TABLET | Refills: 0 | Status: SHIPPED | OUTPATIENT
Start: 2021-07-13 | End: 2021-07-16

## 2021-07-13 RX ORDER — SODIUM HYPOCHLORITE 1.25 MG/ML
SOLUTION TOPICAL
Qty: 1 BOTTLE | Refills: 0 | Status: ON HOLD | OUTPATIENT
Start: 2021-07-13 | End: 2022-03-05

## 2021-07-13 RX ORDER — CLINDAMYCIN HYDROCHLORIDE 300 MG/1
300 CAPSULE ORAL 2 TIMES DAILY
Qty: 14 CAPSULE | Refills: 0 | Status: SHIPPED | OUTPATIENT
Start: 2021-07-13 | End: 2021-07-20

## 2021-07-13 RX ORDER — GREEN TEA/HOODIA GORDONII 315-12.5MG
1 CAPSULE ORAL 2 TIMES DAILY
Qty: 60 TABLET | Refills: 0 | Status: SHIPPED | OUTPATIENT
Start: 2021-07-13 | End: 2021-08-12

## 2021-07-13 RX ADMIN — DIGOXIN 125 MCG: 250 TABLET ORAL at 10:45

## 2021-07-13 RX ADMIN — CLINDAMYCIN PHOSPHATE 600 MG: 600 INJECTION, SOLUTION INTRAVENOUS at 11:26

## 2021-07-13 RX ADMIN — DOCUSATE SODIUM 100 MG: 100 CAPSULE, LIQUID FILLED ORAL at 10:46

## 2021-07-13 RX ADMIN — SODIUM CHLORIDE, PRESERVATIVE FREE 10 ML: 5 INJECTION INTRAVENOUS at 10:47

## 2021-07-13 RX ADMIN — POLYETHYLENE GLYCOL 3350 17 G: 17 POWDER, FOR SOLUTION ORAL at 11:34

## 2021-07-13 RX ADMIN — CLONIDINE HYDROCHLORIDE 0.2 MG: 0.2 TABLET ORAL at 10:44

## 2021-07-13 RX ADMIN — DILTIAZEM HYDROCHLORIDE 120 MG: 120 CAPSULE, EXTENDED RELEASE ORAL at 10:45

## 2021-07-13 RX ADMIN — ALBUTEROL SULFATE 2.5 MG: 2.5 SOLUTION RESPIRATORY (INHALATION) at 08:24

## 2021-07-13 RX ADMIN — METOPROLOL TARTRATE 50 MG: 50 TABLET, FILM COATED ORAL at 06:13

## 2021-07-13 RX ADMIN — BUDESONIDE AND FORMOTEROL FUMARATE DIHYDRATE 2 PUFF: 160; 4.5 AEROSOL RESPIRATORY (INHALATION) at 08:24

## 2021-07-13 RX ADMIN — SODIUM CHLORIDE, PRESERVATIVE FREE 10 ML: 5 INJECTION INTRAVENOUS at 11:26

## 2021-07-13 RX ADMIN — PANTOPRAZOLE SODIUM 40 MG: 40 TABLET, DELAYED RELEASE ORAL at 10:46

## 2021-07-13 RX ADMIN — CLINDAMYCIN PHOSPHATE 600 MG: 600 INJECTION, SOLUTION INTRAVENOUS at 04:33

## 2021-07-13 RX ADMIN — HYDROCODONE BITARTRATE AND ACETAMINOPHEN 1 TABLET: 5; 325 TABLET ORAL at 10:51

## 2021-07-13 ASSESSMENT — PAIN SCALES - GENERAL: PAINLEVEL_OUTOF10: 5

## 2021-07-13 NOTE — PROGRESS NOTES
INTERNAL MEDICINE Progress Note  7/13/2021 11:04 AM  Subjective:   Admit Date: 7/7/2021  PCP: Anant Butts MD  Interval History: seen,     No new c/o today  Doing better today      Objective:   Vitals: /64   Pulse 62   Temp 97.6 °F (36.4 °C) (Oral)   Resp 20   Ht 6' 1\" (1.854 m)   Wt 233 lb 7.5 oz (105.9 kg)   SpO2 97%   BMI 30.80 kg/m²   General appearance: alert and cooperative with exam  HEENT:  atraumatic  Neck:  no JVD  Lungs: clear to auscultation bilaterally  Heart: S1, S2 normal  Abdomen: soft, distended, BS+, dressing to left side abd wound-clean base  Extremities: no edema,   Neurologic: alert, oriented x3, thought content appropriate      Medications:   Scheduled Meds:   sodium chloride flush  5-40 mL Intravenous 2 times per day    polyethylene glycol  17 g Oral Daily    clindamycin (CLEOCIN) IV  600 mg Intravenous Q8H    docusate sodium  100 mg Oral BID    cloNIDine  0.2 mg Oral BID    metoprolol tartrate  50 mg Oral Q12H    pantoprazole  40 mg Oral Daily    dilTIAZem  120 mg Oral Daily    latanoprost  1 drop Both Eyes Nightly    digoxin  125 mcg Oral Daily    losartan  50 mg Oral Nightly    budesonide-formoterol  2 puff Inhalation BID    rivaroxaban  20 mg Oral Daily     Continuous Infusions:   sodium chloride Stopped (07/12/21 2210)       Lab Results:   CBC:   Recent Labs     07/11/21  0627 07/12/21  0730 07/13/21  0553   WBC 9.9 17.2* 12.1*   HGB 10.3* 10.9* 10.3*    276 270     BMP:    Recent Labs     07/11/21  0627 07/12/21  0730 07/13/21  0553   * 132* 135   K 5.0 4.9 4.3   CL 93* 95* 99   CO2 21* 26 23   BUN 16 16 17   CREATININE 0.7 0.7 0.9   GLUCOSE 106 128* 93     Magnesium:    Lab Results   Component Value Date    MG 1.9 11/13/2016     TSH:    Lab Results   Component Value Date    TSH 1.540 07/07/2021     Organism Abnormal  07/07/2021  4:30  [facility]   Staphylococcus aureus    Aerobic Culture 07/07/2021  4:30 PM [facility]   heavy growth This is a MRSA (Methicillin Resistant Staphylococcus aureus)isolate. Isolates of MRSA (ORSA) Methicillin (Oxacillin) Resistant Staphylococcus aureus (coagulase positive) require patient be placed in CONTACT isolation. Methicillin(Oxacillin)resistant strains of staphylococci (MRSA)or(MRSE)should be considered resistant to all classes of cephalosporins, penems and beta-lactams. In the treatment of gram positive infections, GENTAMICIN should be CONSIDERED a SYNERGYSTIC agent ONLY. Performed by: [facility]  Source: abdomen       Site: swab left lower          Current Antibiotics: not stated   Susceptibility  Staphylococcus aureus (1)  Antibiotic Interpretation ROSA Status    gentamicin Sensitive <=0.5 mcg/mL Final    ciprofloxacin Resistant >=8 mcg/mL Final    oxacillin Resistant >=4 mcg/mL Final    ICR (D test) Negative Neg  mcg/mL Final     (Dtest) ICR- inducible clinda resistance    If +, then inducible erm gene       present. Clindamycin may be       effective in some patients.       trimethoprim-sulfamethoxazole Sensitive <=10 mcg/mL Final    clindamycin Sensitive <=0.25 mcg/mL Final    tetracycline Sensitive <=1 mcg/mL Final        Assessment and Plan:   1. Abscess left abd wall s/p excisional debridement  2. Cellulitis abd wall  3. Hyponatremia-improved  4. HTN  5. A fib  6. COPD     Cont wound care,  Po antibiotic  Resumed NOAC  Will dc to assisted living facility with [facility].     Angelo Rhodes MD, MD

## 2021-07-13 NOTE — CARE COORDINATION
7/13/21, 11:43 AM EDT    DISCHARGE PLANNING EVALUATION      Spoke with Latasha Nikki. [facility] will accept back, if patient has home health for daily dressing changes. Spoke with son, who is in agreement with home health, and prefers return to [facility]. [facility] is first choice, but cannot accept daily care referrals. Referral made to [facility]. [facility] has accepted and will manage dressing changes at [facility]. Spoke with Nola Tristan RN to confirm plan. Chart information faxed. Confirmed plan with patient's son. 7/13/21, 2:01 PM EDT    Patient goals/plan/ treatment preferences discussed by  and . Patient goals/plan/ treatment preferences reviewed with patient/ family. Patient/ family verbalize understanding of discharge plan and are in agreement with goal/plan/treatment preferences. Understanding was demonstrated using the teach back method. AVS provided by RN at time of discharge, which includes all necessary medical information pertaining to the patients current course of illness, treatment, post-discharge goals of care, and treatment preferences.     Services After Discharge  Services At/After Discharge: Nursing Services ([facility])   IMM Letter  IMM Letter given to Patient/Family/Significant other/Guardian/POA/by[de-identified] cm  IMM Letter date given[de-identified] 07/13/21  IMM Letter time given[de-identified] [id number]

## 2021-07-13 NOTE — PLAN OF CARE
Problem: Falls - Risk of:  Goal: Will remain free from falls  Description: Will remain free from falls  Outcome: Ongoing  Note: Patient using call light appropriately to call for assistance with ambulation to the bathroom and to chair. Patient is compliant with use of non-skid slippers. Patient reports understanding of fall prevention when discussed. Bed alarm remains in place. Goal: Absence of physical injury  Description: Absence of physical injury  Outcome: Ongoing  Note: Patient is free from physical injury at this time. Problem: DISCHARGE BARRIERS  Goal: Patient's continuum of care needs are met  Outcome: Ongoing  Note: Patient plans to return to [address] [place] [address],[address] at discharge.  assisting with discharge planning. Problem: Infection - Methicillin-Resistant Staphylococcus Aureus Infection:  Goal: Absence of methicillin-resistant Staphylococcus aureus infection  Description: Absence of methicillin-resistant Staphylococcus aureus infection  Outcome: Ongoing  Note: Patient remains in contact isolation. IV ATB continue. Problem: Pain:  Goal: Pain level will decrease  Description: Pain level will decrease  Outcome: Ongoing  Note: Patient denies any pain at this time. Will continue to monitor. Pillows used for support, ice applied to left lower abdomen, and patient able to reposition for comfort. Problem: Discharge Planning:  Goal: Discharged to appropriate level of care  Description: Discharged to appropriate level of care  Outcome: Ongoing  Note: Patient plans to return to [facility] at discharge.  assisting with discharge planning. Care plan reviewed with patient. Patient verbalizes understanding of the plan of care and contributes to goal setting.

## 2021-07-13 NOTE — PROGRESS NOTES
Progress note: Infectious diseases    Patient - Solomon Barbosa,  Age - 82 y.o.    - 1935      Room Number - 7K-16/016-A   MRN -  [id number]   Acct # - [de-identified]  Date of Admission -  2021 11:58 AM    SUBJECTIVE:   He is feeling well. Discussed with his son . OBJECTIVE   VITALS    height is 6' 1\" (1.854 m) and weight is 233 lb 7.5 oz (105.9 kg). His oral temperature is 98 °F (36.7 °C). His blood pressure is 133/63 and his pulse is 60. His respiration is 18 and oxygen saturation is 95%. Wt Readings from Last 3 Encounters:   21 233 lb 7.5 oz (105.9 kg)   17 262 lb (118.8 kg)   16 260 lb (117.9 kg)       I/O (24 Hours)    Intake/Output Summary (Last 24 hours) at 2021 1304  Last data filed at 2021 1126  Gross per 24 hour   Intake 691 ml   Output 0 ml   Net 691 ml       General Appearance  Awake, alert, oriented,  not  In acute distress  HEENT - normocephalic, atraumatic, pink conjunctiva,  anicteric sclera  Neck - Supple, no mass   l.   Abdomen - dressed abdominal wound,   Neurologic -oriented  Skin - + bruising of the forearm  Extremities - No edema, no cyanosis, clubbing     MEDICATIONS:      sodium chloride flush  5-40 mL Intravenous 2 times per day    polyethylene glycol  17 g Oral Daily    clindamycin (CLEOCIN) IV  600 mg Intravenous Q8H    docusate sodium  100 mg Oral BID    cloNIDine  0.2 mg Oral BID    metoprolol tartrate  50 mg Oral Q12H    pantoprazole  40 mg Oral Daily    dilTIAZem  120 mg Oral Daily    latanoprost  1 drop Both Eyes Nightly    digoxin  125 mcg Oral Daily    losartan  50 mg Oral Nightly    budesonide-formoterol  2 puff Inhalation BID    rivaroxaban  20 mg Oral Daily      sodium chloride Stopped (21 2210)     sodium chloride flush, sodium chloride, ondansetron **OR** ondansetron, morphine **OR** morphine, HYDROcodone 5 mg - acetaminophen **OR** HYDROcodone 5 mg - acetaminophen, sodium phosphate, ammonium lactate, traZODone, fluticasone, bisacodyl, docusate sodium, magnesium hydroxide, acetaminophen, ondansetron, albuterol, ipratropium-albuterol      LABS:     CBC:   Recent Labs     07/11/21  0627 07/12/21  0730 07/13/21  0553   WBC 9.9 17.2* 12.1*   HGB 10.3* 10.9* 10.3*    276 270     BMP:    Recent Labs     07/11/21  0627 07/12/21  0730 07/13/21  0553   * 132* 135   K 5.0 4.9 4.3   CL 93* 95* 99   CO2 21* 26 23   BUN 16 16 17   CREATININE 0.7 0.7 0.9   GLUCOSE 106 128* 93     Calcium:  Recent Labs     07/13/21  0553   CALCIUM 9.2         Problem list of patient:     Patient Active Problem List   Diagnosis Code    COPD (chronic obstructive pulmonary disease) with acute bronchitis ([facility]) J44.0, J20.9    Encephalopathy acute G93.40    Muscular deconditioning R29.898    Constipation, chronic K59.09    Dermatitis L30.9    COPD (chronic obstructive pulmonary disease) ([facility] [address] J44.9    COPD with exacerbation ([facility]) J44.1    Obesity (BMI 30-39. 9) E66.9    COPD with exacerbation ([facility]) J44.1    Acute bronchitis J20.9    Hypertension I10    Atrial fibrillation ([facility]) I48.91    SSS (sick sinus syndrome) ([facility]) I49.5    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[facility]) I49.5    Atrial fibrillation, chronic ([facility]) I48.20    S/P cardiac pacemaker procedure Z95.0    NSTEMI (non-ST elevated myocardial infarction) ([facility]) I21.4    Respiratory failure with hypercapnia ([facility]) J96.92    Pneumonia J18.9    Pulmonary edema cardiac cause ([facility]) I50.1    Respiratory distress R06.03    COPD exacerbation ([facility]) J44.1    CHF (congestive heart failure) ([facility]) M97.5    Systolic CHF, acute on chronic ([facility]) I50.23    Hyperglycemia R73.9    Hyponatremia E87.1    Abdominal wall abscess L02.211         ASSESSMENT/PLAN   Abdominal wall abscess due to MRSA: Ok with discharge plan he will follow with [facility] surgery for wound care. Will see him as needed.   Layton Maddox, MD, MD, FACP 7/13/2021 1:04 PM

## 2021-07-13 NOTE — PROGRESS NOTES
[facility] - Dr. Jake Carrizales   Daily Progress Note    Pt Name: Cristine Collins Record Number: [id number]  Date of Birth 1935   Today's Date: 7/13/2021    Hospital day # 6     ASSESSMENT   Left abdominal wall abscess with necrosis - POD # 2 Status post excisional debridement LLQ abdominal wall open necrotic wound (5x5x2 cm)  MRSA infection   Hyponatremia  Hypertension  Atrial fibrillation  COPD  Obesity (BMI 30)   has a past medical history of Arthritis, Asthma, Atrial fibrillation ([address] CHF (congestive heart failure) ([facility] [address] COPD (chronic obstructive pulmonary disease) ([facility] [address] Encephalopathy acute, Hyperlipidemia, Hypertension, Muscular deconditioning, and Pneumonia. PLAN   Tolerating diet  IV antibiotics per infectious disease. Switching to oral.   Wound care today with packing changes - Dakin's daily   Resumed eliquis. Hemoglobin stable. WBC better today   Continue medical management per admitting  [facility]ed with Dr. Braulio Mccrary and Dr. Mayelin Cisneros. See us in 1 week. SUBJECTIVE   Chief complaint: Left abdominal wall open wound     Stable overnight. Chart reviewed. Updated by nursing staff. Afebrile. Vital signs stable. Packing wound. Less pain around wound. Packed without foul odor or any necrosis. Denies chest pain or shortness of breath. No new urinary complaints. No fever, chills or sweats. Up with assistance.   CURRENT MEDICATIONS   Scheduled Meds:   sodium chloride flush  5-40 mL Intravenous 2 times per day    polyethylene glycol  17 g Oral Daily    clindamycin (CLEOCIN) IV  600 mg Intravenous Q8H    docusate sodium  100 mg Oral BID    cloNIDine  0.2 mg Oral BID    metoprolol tartrate  50 mg Oral Q12H    pantoprazole  40 mg Oral Daily    dilTIAZem  120 mg Oral Daily    latanoprost  1 drop Both Eyes Nightly    digoxin  125 mcg Oral Daily    losartan  50 mg Oral Nightly    budesonide-formoterol  2 puff Inhalation BID    rivaroxaban 20 mg Oral Daily     Continuous Infusions:   sodium chloride Stopped (21 2210)     PRN Meds:.sodium chloride flush, sodium chloride, ondansetron **OR** ondansetron, morphine **OR** morphine, HYDROcodone 5 mg - acetaminophen **OR** HYDROcodone 5 mg - acetaminophen, sodium phosphate, ammonium lactate, traZODone, fluticasone, bisacodyl, docusate sodium, magnesium hydroxide, acetaminophen, ondansetron, albuterol, ipratropium-albuterol  OBJECTIVE   CURRENT VITALS:  height is 6' 1\" (1.854 m) and weight is 233 lb 7.5 oz (105.9 kg). His oral temperature is 97.6 °F (36.4 °C). His blood pressure is 106/64 and his pulse is 62. His respiration is 20 and oxygen saturation is 97%. Temperature Range (24h):Temp: 97.6 °F (36.4 °C) Temp  Av.6 °F (36.4 °C)  Min: 97.4 °F (36.3 °C)  Max: 97.6 °F (36.4 °C)  BP Range (74U): Systolic (68HZG), HZP:650 , Min:97 , AGR:956     Diastolic (91WRF), SZJ:68, Min:45, Max:64    Pulse Range (24h): Pulse  Av.5  Min: 60  Max: 64  Respiration Range (24h): Resp  Av.4  Min: 18  Max: 20  Current Pulse Ox (24h):  SpO2: 97 %  Pulse Ox Range (24h):  SpO2  Av.6 %  Min: 96 %  Max: 99 %  Oxygen Amount and Delivery:    Incentive Spirometry Tx:            GENERAL: alert, cooperative, no distress  LUNGS: clear to ausculation, without wheezes, rales or rhonci  HEART: normal rate and regular rhythm  ABDOMEN: soft, obese, LLQ abdominal wall wound with packing - no bleeding or significant pain, no generalized guarding or peritoneal signs. No bleeding. Minimal drainage. NEUROLOGIC: There are no focalizing motor or sensory deficits. CN II-XII are grossly intact. EXTREMITIES: no cyanosis, no clubbing and no edema. In: 691 [P.O.:440; I.V.:251]  Out: 0   Date 07/13/21 0000 - 21   Shift 9953-1139 0944-2595 4015-2359 24 Hour Total   INTAKE   P.O. 200   200   I. V.(mL/kg/hr) 0(0) 20  20   Shift Total(mL/kg) 200(1.9) 20(0.2)  220(2.1)   OUTPUT   Urine(mL/kg/hr) 0(0)   0   Emesis/NG output 0   0   Other 0   0   Stool 0   0   Blood 0   0   Shift Total(mL/kg) 0(0)   0(0)   Weight (kg) 105.9 105.9 105.9 105.9     LABS     Recent Labs     07/11/21  0627 07/12/21  0730 07/13/21  0553   WBC 9.9 17.2* 12.1*   HGB 10.3* 10.9* 10.3*   HCT 32.0* 32.7* 31.0*    276 270   * 132* 135   K 5.0 4.9 4.3   CL 93* 95* 99   CO2 21* 26 23   BUN 16 16 17   CREATININE 0.7 0.7 0.9   CALCIUM 9.1 9.4 9.2      7/12/2021  1:11 PM - [place], [facility] Incoming Lab Results From Soft    Specimen Information: Abdomen        Component Collected Lab   Anaerobic Culture 07/07/2021  4:30 PM [facility]   No anaerobes isolated- preliminary No anaerobes isolated     Gram Stain Result 07/07/2021  4:30 PM [facility]   Few segmented neutrophils observed. No epithelial cells observed. Moderate gram positive cocci in pairs and clusters. Organism Abnormal  07/07/2021  4:30  [facility]   Staphylococcus aureus    Aerobic Culture 07/07/2021  4:30 PM [facility]   heavy growth This is a MRSA (Methicillin Resistant Staphylococcus aureus)isolate. Isolates of MRSA (ORSA) Methicillin (Oxacillin) Resistant Staphylococcus aureus (coagulase positive) require patient be placed in CONTACT isolation. Methicillin(Oxacillin)resistant strains of staphylococci (MRSA)or(MRSE)should be considered resistant to all classes of cephalosporins, penems and beta-lactams. In the treatment of gram positive infections, GENTAMICIN should be CONSIDERED a SYNERGYSTIC agent ONLY. Testing Performed By    [address] Name Director Address Valid Date Range   854-DT - [address] Saurabh Mccormick MD [address] [place] [id number] 08/30/17 0855-Present   Narrative  Performed by: [facility]  Source: abdomen       Site: swab left lower          Current Antibiotics: not stated   Susceptibility    Staphylococcus aureus (1)    Antibiotic Interpretation ROSA Status    gentamicin Sensitive <=0.5 mcg/mL Final    ciprofloxacin Resistant >=8 mcg/mL Final    oxacillin Resistant >=4 mcg/mL Final    ICR (D test) Negative Neg  mcg/mL Final     (Dtest) ICR- inducible clinda resistance    If +, then inducible erm gene       present. Clindamycin may be       effective in some patients.       trimethoprim-sulfamethoxazole Sensitive <=10 mcg/mL Final    clindamycin Sensitive <=0.25 mcg/mL Final    tetracycline Sensitive <=1 mcg/mL Final      RADIOLOGY   none    Electronically signed by TREVA Choudhury - CNP on 7/13/2021 at 12:06 PM    Above discussed and I agree with Ruth Woodruff CNP. See my additional comments below for updated orders and plan. Labs, cultures, and radiographs where available were reviewed. I discussed patient concerns with Anjel Abreu and instructions were given. Please see our orders for the updated patient care plan.     Electronically signed by Diana Jasso MD on 7/13/21 at 1:20 PM EDT

## 2021-07-13 NOTE — DISCHARGE SUMMARY
Discharge Summary    Baron Chacha Prescott  :  1935  MRN:  [id number]    Admit date:  2021  Discharge date:      Admitting Physician:  Ivana Berman MD    Discharge Diagnoses:    1. Abscess left abd wall s/p excisional debridement  2. Cellulitis abd wall  3. Hyponatremia-improved  4. HTN  5. A fib  6. COPD    Admission Condition:  serious  Discharged Condition:  good    Hospital Course:  Patient presented with an abd wall abscess, required excisional debridement under anesthesia. Maintained on antibiotics for MRSA isolation from the wound. He remained stable post op. He has euvolemic hyponatremia, treated with fluid restriction and Samsca. Will continue antibiotics and wound care as OP.     Discharge Medications:       [place], [address] Medication Instructions LVP:[id number]    Printed on:21 1121   Medication Information                      acetaminophen 650 MG TABS  Take 650 mg by mouth every 4 hours as needed             albuterol (PROVENTIL) (2.5 MG/3ML) 0.083% nebulizer solution  Take 3 mLs by nebulization every 6 hours as needed for Wheezing             albuterol sulfate HFA (PROAIR HFA) 108 (90 BASE) MCG/ACT inhaler  INHALE 2 PUFFS BY MOUTH EVERY 4 HOURS AS NEEDED             AMITIZA 24 MCG capsule  TAKE ONE (1) CAPSULE BY MOUTH ONCE DAILY WITH FOOD             candesartan (ATACAND) 16 MG tablet  Take 16 mg by mouth 2 times daily             Cholecalciferol (VITAMIN D) 50 MCG (2000 UT) CAPS capsule  Take 2,000 Units by mouth daily             clindamycin (CLEOCIN) 300 MG capsule  Take 1 capsule by mouth 2 times daily for 7 days             cloNIDine (CATAPRES) 0.2 MG tablet  Take 1 tablet by mouth three times daily             clotrimazole (LOTRIMIN) 1 % cream  APPLY TOPICALLY AS DIRECTED             diltiazem (CARDIZEM CD) 120 MG ER capsule  Take 1 capsule by mouth daily             docusate sodium (COLACE) 100 MG capsule  TAKE ONE (1) CAPSULE BY MOUTH TWICE DAILY each topically every 12 hours as needed (rash) Apply topically 2 times daily. vitamin B-12 (CYANOCOBALAMIN) 100 MCG tablet  Take 1,000 mcg by mouth daily             XARELTO 20 MG TABS tablet  TAKE ONE (1) TABLET BY MOUTH EACH EVENING WITH MEAL                 Consults:  ID and general surgery    Significant Diagnostic Studies: labs: CBC:         Recent Labs     07/11/21  0627 07/12/21  0730 07/13/21  0553   WBC 9.9 17.2* 12.1*   HGB 10.3* 10.9* 10.3*    276 270      BMP:          Recent Labs     07/11/21  0627 07/12/21  0730 07/13/21  0553   * 132* 135   K 5.0 4.9 4.3   CL 93* 95* 99   CO2 21* 26 23   BUN 16 16 17   CREATININE 0.7 0.7 0.9   GLUCOSE 106 128* 93      Magnesium:          Lab Results   Component Value Date     MG 1.9 11/13/2016      TSH:          Lab Results   Component Value Date     TSH 1.540 07/07/2021      Organism Abnormal  07/07/2021  4:30  [facility]   Staphylococcus aureus    Aerobic Culture 07/07/2021  4:30  [facility]   heavy growth This is a MRSA (Methicillin Resistant Staphylococcus aureus)isolate. Isolates of MRSA (ORSA) Methicillin (Oxacillin) Resistant Staphylococcus aureus (coagulase positive) require patient be placed in CONTACT isolation. Methicillin(Oxacillin)resistant strains of staphylococci (MRSA)or(MRSE)should be considered resistant to all classes of cephalosporins, penems and beta-lactams. In the treatment of gram positive infections, GENTAMICIN should be CONSIDERED a SYNERGYSTIC agent ONLY.     Performed by: [facility]  Source: abdomen       Site: swab left lower          Current Antibiotics: not stated   Susceptibility  Staphylococcus aureus (1)           Antibiotic Interpretation ROSA Status     gentamicin Sensitive <=0.5 mcg/mL Final     ciprofloxacin Resistant >=8 mcg/mL Final     oxacillin Resistant >=4 mcg/mL Final     ICR (D test) Negative Neg  mcg/mL Final       (Dtest) ICR- inducible clinda resistance    If +, then inducible erm gene       present. Clindamycin may be       effective in some patients.       trimethoprim-sulfamethoxazole Sensitive <=10 mcg/mL Final     clindamycin Sensitive <=0.25 mcg/mL Final     tetracycline Sensitive <=1 mcg/mL Final           Assessment and Plan:   7. Abscess left abd wall s/p excisional debridement  8. Cellulitis abd wall  9. Hyponatremia-improved  10. HTN  11. A fib  12. COPD     Cont wound care,  Po antibiotic  Resumed NOAC  Will dc to assisted living facility with [facility]. Treatments:   Antibiotics, excisional debridement, resumed NOAC post op    Disposition:  Home/ shelter.     Signed:  Anderson Milan MD  7/13/2021, 11:21 AM

## 2021-07-14 ENCOUNTER — CARE COORDINATION (OUTPATIENT)
Dept: CASE MANAGEMENT | Age: 86
End: 2021-07-14

## 2021-07-14 NOTE — CARE COORDINATION
reviewed discharge instructions, medical action plan and red flags with patient who verbalized understanding. Patient given an opportunity to ask questions and does not have any further questions or concerns at this time. Were discharge instructions available to patient? No. Reviewed appropriate site of care based on symptoms and resources available to patient including: PCP and Home health. The patient agrees to contact the PCP office for questions related to their healthcare. Medication reconciliation was performed with patient, who verbalizes understanding of administration of home medications. Advised obtaining a 90-day supply of all daily and as-needed medications. Covid Risk Education     Educated patient about risk for severe COVID-19 due to risk factors according to CDC guidelines. CTN reviewed discharge instructions, medical action plan and red flag symptoms with the patient who verbalized understanding. Discussed COVID vaccination status: No. Education provided on COVID-19 vaccination as appropriate. Discussed exposure protocols and quarantine with CDC Guidelines. Patient was given an opportunity to verbalize any questions and concerns and agrees to contact CTN or health care provider for questions related to their healthcare. Reviewed and educated patient on any new and changed medications related to discharge diagnosis. Was patient discharged with a pulse oximeter? No Discussed and confirmed pulse oximeter discharge instructions and when to notify provider or seek emergency care. CTN provided contact information. Plan for follow-up call in 5-7 days based on severity of symptoms and risk factors.   Plan for next call: symptom management-wound assessment      Care Transitions 24 Hour Call    Do you have any ongoing symptoms?: Yes  Patient-reported symptoms: Other  Interventions for patient-reported symptoms: Notified Home Care (Comment: Start of care today)  Do you have a copy of your discharge instructions?: Yes  Do you have all of your prescriptions and are they filled?: Yes  Have you been contacted by a [address]?: No  Have you scheduled your follow up appointment?: Yes  How are you going to get to your appointment?: Car - family or friend to transport  Were you discharged with any Home Care or Post Acute Services: Yes  Post Acute Services: Home Health (Comment: [facility])  Do you feel like you have everything you need to keep you well at home?: Yes  Care Transitions Interventions         Follow Up  Future Appointments   Date Time Provider Paul Dela Cruz   7/21/2021  2:00 PM SCHEDULE, [facility] ADV SURG NURSE N Adv Surg [facility] - Cristina Murphy RN

## 2021-07-16 LAB
AEROBIC CULTURE: ABNORMAL
ANAEROBIC CULTURE: ABNORMAL
GRAM STAIN RESULT: ABNORMAL
ORGANISM: ABNORMAL

## 2021-07-21 ENCOUNTER — OFFICE VISIT (OUTPATIENT)
Dept: SURGERY | Age: 86
End: 2021-07-21

## 2021-07-21 ENCOUNTER — CARE COORDINATION (OUTPATIENT)
Dept: CASE MANAGEMENT | Age: 86
End: 2021-07-21

## 2021-07-21 VITALS
WEIGHT: 233 LBS | RESPIRATION RATE: 18 BRPM | TEMPERATURE: 97.5 F | DIASTOLIC BLOOD PRESSURE: 62 MMHG | SYSTOLIC BLOOD PRESSURE: 122 MMHG | HEART RATE: 82 BPM | BODY MASS INDEX: 30.88 KG/M2 | OXYGEN SATURATION: 98 % | HEIGHT: 73 IN

## 2021-07-21 DIAGNOSIS — Z22.322 POSITIVE RESULT FOR METHICILLIN RESISTANT STAPHYLOCOCCUS AUREUS (MRSA) SCREENING: Primary | ICD-10-CM

## 2021-07-21 PROCEDURE — 99024 POSTOP FOLLOW-UP VISIT: CPT | Performed by: SURGERY

## 2021-07-21 NOTE — CARE COORDINATION
[facility] Transitions Follow Up Call    2021    Patient: Lea Prescott  Patient : 1935   MRN: [id number]  Reason for Admission:  Abd Abscess  Discharge Date: 21 RARS: Readmission Risk Score: 12    Spoke with:  Mich Salazar son of Mr. Sheela Isaac, states everything is going fine, he is doing good, he has a doctors appt today with Vladislav for a check up. Denies any needs at this time. Care Transitions Follow Up Call    Needs to be reviewed by the provider   Additional needs identified to be addressed with provider: No  none             Method of communication with provider : none      Care Transition Nurse (CTN) contacted the family by telephone to follow up after admission. Verified name and  with family as identifiers. Addressed changes since last contact: home health care-still coming out and follow up appt today with surgeon  Discussed follow-up appointments. If no appointment was previously scheduled, appointment scheduling offered: No.   Is follow up appointment scheduled within 7 days of discharge? Yes and No.    Advance Care Planning:   Does patient have an Advance Directive: reviewed and current. CTN reviewed discharge instructions, medical action plan and red flags with family and discussed any barriers to care and/or understanding of plan of care after discharge. Discussed appropriate site of care based on symptoms and resources available to patient including: PCP and Specialist. The family agrees to contact the PCP office for questions related to their healthcare. Patients top risk factors for readmission: medical condition-abscess, medication management and polypharmacy  Interventions to address risk factors: Obtained and reviewed discharge summary and/or continuity of care documents      Non-[facility] follow up appointment(s):      CTN provided contact information for future needs. Plan for follow-up call in 7-10 days based on severity of symptoms and risk factors.   Plan for next call: symptom management-pain, swelling etc s/s of infection and follow up appointment- Surgeon             Care Transitions Subsequent and Final Call    Subsequent and Final Calls  Do you have any ongoing symptoms?: No  Have your medications changed?: No  Do you have any questions related to your medications?: No  Do you currently have any active services?: Yes  Are you currently active with any services?: Home Health  Do you have any needs or concerns that I can assist you with?: No  Identified Barriers: None  Care Transitions Interventions  Other Interventions:            Follow Up  Future Appointments   Date Time Provider Paul Dela Cruz   7/21/2021  2:00 PM SCHEDULE, [facility] ADV SURG NURSE N Adv Surg [facility] - Maria Dolores RINCON RN

## 2021-07-21 NOTE — PROGRESS NOTES
5 cm wide x 3 cm in length  2 cm 1 oclock 1.5 cm at 3oclock tunneling-wound bed pink, minimal drainage. Packed with 4x4's dampened with saline. ABD's applied and secured with tape.  MRSA swab nares done

## 2021-07-24 LAB — MRSA SCREEN: NORMAL

## 2021-07-26 NOTE — PROGRESS NOTES
CLINICAL PHARMACY NOTE: MEDS TO BEDS    Total # of Prescriptions Filled: 4   The following medications were delivered to the patient:  Acidophilus/Pectin  Clindamycin 300mg  Hydrocodone-APAP 5/325mg  Dakins    Additional Documentation:

## 2021-07-28 ENCOUNTER — PROCEDURE VISIT (OUTPATIENT)
Dept: SURGERY | Age: 86
End: 2021-07-28

## 2021-07-28 ENCOUNTER — CARE COORDINATION (OUTPATIENT)
Dept: CARE COORDINATION | Age: 86
End: 2021-07-28

## 2021-07-28 VITALS
RESPIRATION RATE: 20 BRPM | OXYGEN SATURATION: 98 % | SYSTOLIC BLOOD PRESSURE: 110 MMHG | TEMPERATURE: 97.9 F | DIASTOLIC BLOOD PRESSURE: 60 MMHG | HEART RATE: 79 BPM

## 2021-07-28 DIAGNOSIS — Z98.890 S/P EXCISIONAL DEBRIDEMENT: ICD-10-CM

## 2021-07-28 DIAGNOSIS — Z22.322 POSITIVE RESULT FOR METHICILLIN RESISTANT STAPHYLOCOCCUS AUREUS (MRSA) SCREENING: Primary | ICD-10-CM

## 2021-07-28 NOTE — CARE COORDINATION
[facility] Transitions Follow Up Call    2021    Patient: Cris Prescott  Patient : 1935   MRN: <[id number]>  Reason for Admission: Abdominal abscess  Discharge Date: 21 RARS: Readmission Risk Score: 16         Spoke with: Timmy Jenkins, son. Timmy Jenkins states that Patient is doing well. Wound seems to be doing good. Home health is coming every day to change dressing. Patient has an appt today with wound care. Appetite is fair. Patient does supplement with Ensure/boost drinks as well as a vegetable drink. Denies any other needs at this time. Care Transitions Follow Up Call    Needs to be reviewed by the provider   Additional needs identified to be addressed with provider: No  none             Method of communication with provider : none      Care Transition Nurse (CTN) contacted the family by telephone to follow up after admission. Verified name and  with family as identifiers. Addressed changes since last contact: home health care-continues to come daily for wound care. Patient has follow up with surgeon today. Discussed follow-up appointments. If no appointment was previously scheduled, appointment scheduling offered: No.   Is follow up appointment scheduled within 7 days of discharge? Yes. Advance Care Planning:   Does patient have an Advance Directive: reviewed and current. CTN reviewed discharge instructions, medical action plan and red flags with family and discussed any barriers to care and/or understanding of plan of care after discharge. Discussed appropriate site of care based on symptoms and resources available to patient including: PCP, specialist. The family agrees to contact the PCP office for questions related to their healthcare.      Patients top risk factors for readmission: medical condition-abdominal abscess, medication management and polypharmacy  Interventions to address risk factors: Obtained and reviewed discharge summary and/or continuity of care

## 2021-07-29 ENCOUNTER — TELEPHONE (OUTPATIENT)
Dept: PHARMACY | Facility: CLINIC | Age: 86
End: 2021-07-29

## 2021-07-29 LAB — MRSA SCREEN: NORMAL

## 2021-07-29 ASSESSMENT — ENCOUNTER SYMPTOMS
COLOR CHANGE: 0
VOMITING: 0
SHORTNESS OF BREATH: 1
EYE PAIN: 0
EYE ITCHING: 0
WHEEZING: 0
PHOTOPHOBIA: 0
DIARRHEA: 0
CHOKING: 0
BACK PAIN: 0
BLOOD IN STOOL: 0
ALLERGIC/IMMUNOLOGIC NEGATIVE: 1
ANAL BLEEDING: 0
SINUS PRESSURE: 0
CHEST TIGHTNESS: 0
ABDOMINAL DISTENTION: 0
EYE DISCHARGE: 0
COUGH: 0
ABDOMINAL PAIN: 0
RHINORRHEA: 0
CONSTIPATION: 0
NAUSEA: 0
APNEA: 0
SORE THROAT: 0

## 2021-07-29 NOTE — TELEPHONE ENCOUNTER
CLINICAL PHARMACY NOTE  Post-Discharge Transitions of Care (KATE)    Patient appears to have been discharged from [address]   on 07/13/21. Patient not found in Outcomes MTM. Please follow-up with patient to review medications.       30 days since discharge = 08/12/21     [address]  [phone] or [phone] (Option 7)

## 2021-07-29 NOTE — PROGRESS NOTES
[address] [place] [facility] [address]  Dept: [phone]  Dept Fax: [phone]  Loc: [phone]    Visit Date: 7/28/2021    Dylon Malik is a 82 y.o. male who presents today for:  Chief Complaint   Patient presents with   Warren Pabon Procedure     Excisional debridement of skin and subcutaneous tissue of left sided abdominal wall necrotic tissue on 7/11/21       HPI:     HPI    Sara Maciel is a 82-year old male patient who presents for wound check. Presents with son. He is currently living in a nursing home. Status post excisional debridement of left abdominal wall wound a little over 2 weeks ago. Nursing home has been doing daily dressing changes with Dakin's solution. Wound is healing. No signs of infection. He has completed antibiotics per ID recommendations. Wound is still too large to close. Minimal drainage. Wound bed is beefy red without bleeding. Minimal pain with dressing changes. Eating well. No nausea or vomiting. No issues with bowel or bladder. Working with therapy as needed. No SOB or chest pain. No fevers or chills.      Past Medical History:   Diagnosis Date    Arthritis     Asthma     Atrial fibrillation (HCC)     CHF (congestive heart failure) (HCC)     COPD (chronic obstructive pulmonary disease) (HCC)     Encephalopathy acute 11/20/2013    Hyperlipidemia     Hypertension     Muscular deconditioning 12/23/2013    Pneumonia       Past Surgical History:   Procedure Laterality Date    ABDOMEN SURGERY      ABDOMEN SURGERY N/A 7/11/2021    LEFT ABDOMEN WALL WOUND DEBRIDEMENT AND REPAIR performed by Radha Badillo MD at [address] [place] [address] EKG 12-LEAD  8/5/2015         FRACTURE SURGERY  unsure    [facility]    OTHER SURGICAL HISTORY  10-30-13    abdominal exploration, closure of evisceration with retention sutures-Dr. Maribeth Ramirez     OTHER SURGICAL HISTORY  10-25-13    Placement of gastrostomy tube-Dr. Valente Martell        Family History   Problem Relation Age of Onset    Arthritis Father     Asthma Father     Heart Disease Father     High Blood Pressure Father     Cancer Mother         ovarian cancer    Diabetes Mother     Cancer Sister     Heart Disease Sister     Diabetes Brother     Cancer Sister     Heart Disease Sister     Heart Disease Brother     Diabetes Brother     Heart Disease Brother        Social History     Tobacco Use    Smoking status: Former Smoker     Packs/day: 2.00     Years: 30.00     Pack years: 60.00     Types: Cigarettes     Start date: 12/17/1978    Smokeless tobacco: Never Used   Substance Use Topics    Alcohol use: No     Comment: 1 time a week      Current Outpatient Medications   Medication Sig Dispense Refill    metoprolol tartrate (LOPRESSOR) 25 MG tablet Take 2 tablets by mouth every 12 hours 60 tablet 3    Probiotic Acidophilus (FLORANEX) TABS Take 1 tablet by mouth 2 times daily 60 tablet 0    sodium hypochlorite (DAKINS) 0.125 % SOLN external solution Pack wound with Dakin's soaked kerlix daily 1 Bottle 0    candesartan (ATACAND) 16 MG tablet Take 16 mg by mouth 2 times daily      vitamin B-12 (CYANOCOBALAMIN) 100 MCG tablet Take 1,000 mcg by mouth daily      polyethylene glycol (GLYCOLAX) 17 g packet Take 17 g by mouth daily as needed for Constipation      triamcinolone (KENALOG) 0.1 % cream Apply 1 each topically every 12 hours as needed (rash) Apply topically 2 times daily.       Cholecalciferol (VITAMIN D) 50 MCG (2000 UT) CAPS capsule Take 2,000 Units by mouth daily      pantoprazole (PROTONIX) 40 MG tablet Take 40 mg by mouth daily      SPIRIVA HANDIHALER 18 MCG inhalation capsule INHALE THE CONTENTS OF ONE CAPSULE BY MOUTH ONCE DAILY AS DIRECTED 30 capsule 5    docusate sodium (COLACE) 100 MG capsule TAKE ONE (1) CAPSULE BY MOUTH TWICE DAILY (Patient taking differently: Take 50 mg by mouth every 8 hours as needed for differently: Take 2.5 mg by nebulization 2 times daily ) 1 Package 3    guaiFENesin (ROBITUSSIN) 100 MG/5ML syrup Take 200 mg by mouth 4 times daily as needed for Cough or Congestion.  Nutritional Supplements (ENSURE COMPLETE) LIQD Take 1 Can by mouth daily. No current facility-administered medications for this visit. Allergies   Allergen Reactions    Metolazone      Causes hyponatremia (with high urine sodium)    Thiazide-Type Diuretics      Causes hyponatremia (with high urine sodium)       Subjective:     Review of Systems   Constitutional: Positive for activity change and fatigue. Negative for appetite change, chills, diaphoresis, fever and unexpected weight change. HENT: Negative for congestion, dental problem, hearing loss, rhinorrhea, sinus pressure and sore throat. Eyes: Negative for photophobia, pain, discharge, itching and visual disturbance. Respiratory: Positive for shortness of breath. Negative for apnea, cough, choking, chest tightness and wheezing. Cardiovascular: Negative for chest pain, palpitations and leg swelling. Gastrointestinal: Negative for abdominal distention, abdominal pain, anal bleeding, blood in stool, constipation, diarrhea, nausea and vomiting. Endocrine: Negative. Genitourinary: Negative for decreased urine volume, difficulty urinating, dysuria, frequency and urgency. Musculoskeletal: Positive for gait problem. Negative for arthralgias, back pain, joint swelling, myalgias and neck pain. Skin: Positive for wound. Negative for color change, pallor and rash. Allergic/Immunologic: Negative. Neurological: Negative for dizziness, tremors, weakness, numbness and headaches. Hematological: Negative. Psychiatric/Behavioral: Negative. Objective:   /60 (Site: Right Upper Arm, Position: Sitting, Cuff Size: Medium Adult)   Pulse 79   Temp 97.9 °F (36.6 °C) (Temporal)   Resp 20   SpO2 98%     Physical Exam  Vitals reviewed. Constitutional:       General: He is not in acute distress. Appearance: Normal appearance. He is well-developed. He is not ill-appearing or toxic-appearing. HENT:      Head: Normocephalic and atraumatic. Right Ear: External ear normal. Decreased hearing noted. Left Ear: External ear normal. Decreased hearing noted. Nose: Nose normal.      Mouth/Throat:      Mouth: Mucous membranes are not pale, not dry and not cyanotic. Eyes:      General: Lids are normal.   Neck:      Trachea: Trachea and phonation normal.   Cardiovascular:      Rate and Rhythm: Normal rate and regular rhythm. Pulses: Normal pulses. Heart sounds: S1 normal and S2 normal.   Pulmonary:      Effort: Pulmonary effort is normal. No tachypnea, bradypnea, accessory muscle usage or respiratory distress. Breath sounds: Normal breath sounds. No decreased breath sounds, wheezing or rales. Chest:      Chest wall: No tenderness. Abdominal:      General: Bowel sounds are normal. There is no distension. Palpations: Abdomen is soft. There is no mass. Tenderness: There is abdominal tenderness. Musculoskeletal:         General: No tenderness. Normal range of motion. Cervical back: Normal range of motion and neck supple. Skin:     General: Skin is warm and dry. Findings: No abrasion, bruising, burn, ecchymosis, erythema, laceration, lesion or rash. Neurological:      Mental Status: He is alert and oriented to person, place, and time. Motor: No tremor, atrophy or abnormal muscle tone. Coordination: Coordination normal.      Gait: Gait normal.      Deep Tendon Reflexes: Reflexes are normal and symmetric. Psychiatric:         Speech: Speech normal.         Behavior: Behavior normal.         Thought Content:  Thought content normal.       7/16/2021  2:15 PM - Luz Marina Delvalle Incoming Lab Results From Soft    Specimen Information: Tissue        Component Collected Lab   Anaerobic Culture 07/11/2021  7:54 AM  - [facility]   No anaerobes isolated- preliminary No anaerobes isolated     Gram Stain Result 07/11/2021  7:54  [facility]   Many segmented neutrophils observed. No epithelial cells observed. Moderate gram positive cocci in pairs and clusters. Organism Abnormal  07/11/2021  7:54  [facility]   Staphylococcus aureus    Aerobic Culture 07/11/2021  7:54 AM  - [address] [place] [address] Lab   heavy growth This is a MRSA (Methicillin Resistant Staphylococcus aureus)isolate. Isolates of MRSA (ORSA) Methicillin (Oxacillin) Resistant Staphylococcus aureus (coagulase positive) require patient be placed in CONTACT isolation. Methicillin(Oxacillin)resistant strains of staphylococci (MRSA)or(MRSE)should be considered resistant to all classes of cephalosporins, penems and beta-lactams. In the treatment of gram positive infections, GENTAMICIN should be CONSIDERED a SYNERGYSTIC agent ONLY. Testing Performed By    [address] Name Director Address Valid Date Range   [facility] - [address] LAB Al Baron MD [address] [place] [id number] 08/30/17 0855-Present   Narrative  Performed by: [address] [facility]  Source: tissue       Site: abd wall abscess          Current Antibiotics: Clindamycin   Susceptibility    Staphylococcus aureus (1)    Antibiotic Interpretation ROSA Status    gentamicin Sensitive <=0.5 mcg/mL Final    ciprofloxacin Resistant >=8 mcg/mL Final    oxacillin Resistant >=4 mcg/mL Final    ICR (D test) Negative Neg  mcg/mL Final     (Dtest) ICR- inducible clinda resistance    If +, then inducible erm gene       present.  Clindamycin may be       effective in some patients.       trimethoprim-sulfamethoxazole Sensitive <=10 mcg/mL Final    clindamycin Sensitive <=0.25 mcg/mL Final    tetracycline Sensitive <=1 mcg/mL Final      Lab Results   Component Value Date    WBC 12.1 (H) 07/13/2021    HGB 10.3 (L) 07/13/2021    HCT 31.0 (L) 07/13/2021    MCV 93.4 07/13/2021     07/13/2021     Lab Results   Component Value Date     07/13/2021    K 4.3 07/13/2021    K 4.9 07/12/2021    CL 99 07/13/2021    CO2 23 07/13/2021    BUN 17 07/13/2021    CREATININE 0.9 07/13/2021    GLUCOSE 93 07/13/2021    CALCIUM 9.2 07/13/2021      Patient Active Problem List   Diagnosis    COPD (chronic obstructive pulmonary disease) with acute bronchitis ([facility])    Encephalopathy acute    Muscular deconditioning    Constipation, chronic    Dermatitis    [facility]) [facility]    COPD with exacerbation ([facility])    Obesity (BMI 30-39. 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[facility])    Atrial fibrillation, chronic ([facility])    S/P cardiac pacemaker procedure    NSTEMI (non-ST elevated myocardial infarction) ([facility])    Respiratory failure with hypercapnia ([facility])    Pneumonia    Pulmonary edema cardiac cause ([facility])    Respiratory distress    COPD exacerbation ([facility])    CHF (congestive heart failure) ([facility])    Systolic CHF, acute on chronic ([facility])    Hyperglycemia    Hyponatremia    Abdominal wall abscess     Assessment:     1. Status post excisional debridement of left sided abdominal wall necrotic wound   2. MRSA infection     Plan:     1. Continue wound care with Dakin's packing. Continue home health care. No closure of wound at this time. 2. Completed antibiotics  3. Appetite doing well. Continue diet as tolerated. High protein supplements encouraged. 4. Bowel function doing well. Stool softeners as needed. 5. Off narcotics. Tylenol as needed for discomfort. 6. Activity as tolerated  7. Repeat MRSA nares screening. Has had one negative test, needs one more to be cleared from isolation at nursing home. 8. Follow up in 2 weeks with Dr. Bryan Sen.  Signs and symptoms reviewed with patient that would be concerning and need him to return to office for re-evaluation. Patient states he will call if he has questions or concerns.       Electronically signed by TREVA Aviles CNP on 7/29/2021 at 3:52 PM

## 2021-07-29 NOTE — TELEPHONE ENCOUNTER
CLINICAL PHARMACY CONSULTATION: Transition of Care ([facility]ATE)  -----------------------------------------------------------------------------------------------  Attempt made to contact pt. Dagmar Galvez states he is not able to review the medications now, but can call back another time. Will continue to attempt to contact pt as appropriate.     Xochitl Gann, PharmD, [address]   Direct: [phone]  Department, toll free: [phone], option 7

## 2021-08-03 ENCOUNTER — TELEPHONE (OUTPATIENT)
Dept: SURGERY | Age: 86
End: 2021-08-03

## 2021-08-03 NOTE — TELEPHONE ENCOUNTER
Jenelle from [facility] home health calling office and states that patient's Mark is giving her a hard time with covering the Dakins solution for pt's wound. Insurance requesting that [place] gets changed to Alginate with foam 3x a week. Suggestions?

## 2021-08-04 ENCOUNTER — CARE COORDINATION (OUTPATIENT)
Dept: CASE MANAGEMENT | Age: 86
End: 2021-08-04

## 2021-08-04 NOTE — TELEPHONE ENCOUNTER
Stephen Huitron made aware, voices understanding. States that they were ordered to use Aquaform with foam 3x a week.

## 2021-08-04 NOTE — CARE COORDINATION
[facility] Transitions Follow Up Call    2021    Patient: Janet Prescott  Patient : 1935   MRN: [id number]  Reason for Admission:  [facility] Transitions Follow Up Call    2021    Patient: Janet Prescott  Patient : 1935   MRN: [id number]  Reason for Admission:  Abd Abscess  Discharge Date: 21 RARS: Readmission Risk Score: 12    Spoke with:  Natalia Galvan son of Mr. Javier Pabon, states everything is going really good. They followed up with Dr. Latisha Sal, he was very happy with how the wound is healing did not put sutures in it, home care is coming out daily for packing and wound changes and they also are happy with how the would is healing. He denies any questions or concerns at this time. Care Transitions Follow Up Call    Needs to be reviewed by the provider   Additional needs identified to be addressed with provider: No  none             Method of communication with provider : none      Care Transition Nurse (CTN) contacted the family by telephone to follow up after admission. Verified name and  with family as identifiers. Addressed changes since last contact: home health care-still coming out and follow up appt today with surgeon  Discussed follow-up appointments. If no appointment was previously scheduled, appointment scheduling offered: No.   Is follow up appointment scheduled within 7 days of discharge? Yes and No.    Advance Care Planning:   Does patient have an Advance Directive: reviewed and current. CTN reviewed discharge instructions, medical action plan and red flags with family and discussed any barriers to care and/or understanding of plan of care after discharge. Discussed appropriate site of care based on symptoms and resources available to patient including: PCP and Specialist. The family agrees to contact the PCP office for questions related to their healthcare.      Patients top risk factors for readmission: medical condition-abscess, medication management and polypharmacy  Interventions to address risk factors: Obtained and reviewed discharge summary and/or continuity of care documents      Non-[facility] follow up appointment(s):      CTN provided contact information for future needs. Plan for follow-up call in 7-10 days based on severity of symptoms and risk factors. Plan for next call: symptom management-pain, swelling etc s/s of infection and follow up appointment- Surgeon     Care Transitions Subsequent and Final Call    Subsequent and Final Calls  Do you have any ongoing symptoms?: No  Have your medications changed?: No  Do you have any questions related to your medications?: No  Do you currently have any active services?: Yes  Are you currently active with any services?: Home Health  Do you have any needs or concerns that I can assist you with?: No  Identified Barriers: None  Care Transitions Interventions  Other Interventions:            Follow Up  Future Appointments   Date Time Provider Paul Dela Cruz   8/11/2021  2:45 PM MD Mila Lind Surg P - Juan Manuel Ricardo RN placement of right sided chest tube Placement of right chest tube

## 2021-08-04 NOTE — TELEPHONE ENCOUNTER
Can they not make this up? Some home health agencies or facilities will? If not I guess whatever dressing change availability they have we can try until next appointment and see how it does.

## 2021-08-10 NOTE — TELEPHONE ENCOUNTER
POPULATION HEALTH CLINICAL PHARMACY REVIEW: Post-Discharge Transitions of Care (KATE)    Third attempy to reach patient again to review medications. Unable to leave message. Will send Mychart message and close encounter at this time.      Lupe Stevenson PharmD, [facility], [address]  Direct: [phone]  Department, toll free: [phone], option 7    =======================================================   For Pharmacy Admin Tracking Only     CPA in place:  No   Gap Closed?: No    Time Spent (min): 10

## 2021-08-11 ENCOUNTER — CARE COORDINATION (OUTPATIENT)
Dept: CASE MANAGEMENT | Age: 86
End: 2021-08-11

## 2021-08-11 ENCOUNTER — PROCEDURE VISIT (OUTPATIENT)
Dept: SURGERY | Age: 86
End: 2021-08-11

## 2021-08-11 VITALS
SYSTOLIC BLOOD PRESSURE: 122 MMHG | DIASTOLIC BLOOD PRESSURE: 62 MMHG | BODY MASS INDEX: 30.88 KG/M2 | OXYGEN SATURATION: 98 % | HEIGHT: 73 IN | HEART RATE: 80 BPM | RESPIRATION RATE: 18 BRPM | TEMPERATURE: 97.1 F | WEIGHT: 233 LBS

## 2021-08-11 DIAGNOSIS — S31.109S OPEN ABDOMINAL WALL WOUND, SEQUELA: ICD-10-CM

## 2021-08-11 DIAGNOSIS — Z98.890 S/P EXCISIONAL DEBRIDEMENT: Primary | ICD-10-CM

## 2021-08-11 PROCEDURE — 99024 POSTOP FOLLOW-UP VISIT: CPT | Performed by: SURGERY

## 2021-08-11 NOTE — CARE COORDINATION
[facility] Transitions Follow Up Call    2021    Patient: Ginny Prescott  Patient : 1935   MRN: [id number]  Reason for Admission: Abd Abscess  Discharge Date: 21 RARS: Readmission Risk Score: 12    Spoke with: varun almeida they are headed right now to Dr. Thomas Brochure for appt, states home care is coming out they deny any questions or concerns. Care Transitions Follow Up Call    Needs to be reviewed by the provider   Additional needs identified to be addressed with provider: No  none             Method of communication with provider : none      Care Transition Nurse (CTN) contacted the patient by telephone to follow up after admission on   . Verified name and  with family as identifiers. Addressed changes since last contact: none  Discussed follow-up appointments. If no appointment was previously scheduled, appointment scheduling offered: No.   Is follow up appointment scheduled within 7 days of discharge? Yes. Advance Care Planning:   Does patient have an Advance Directive: reviewed and current. CTN reviewed discharge instructions, medical action plan and red flags with family and discussed any barriers to care and/or understanding of plan of care after discharge. Discussed appropriate site of care based on symptoms and resources available to patient including: PCP and Specialist. The family agrees to contact the PCP office for questions related to their healthcare. Patients top risk factors for readmission: medical condition-lymphadema and polypharmacy  Interventions to address risk factors: Obtained and reviewed discharge summary and/or continuity of care documents and Assessment and support for treatment adherence and medication management- follow up appt      Non-[facility] follow up appointment(s):      CTN provided contact information for future needs. Plan for follow-up call in 5-7 days based on severity of symptoms and risk factors.   Plan for next call: symptom

## 2021-08-13 ENCOUNTER — TELEPHONE (OUTPATIENT)
Dept: SURGERY | Age: 86
End: 2021-08-13

## 2021-08-13 NOTE — TELEPHONE ENCOUNTER
Called and spoke with [facility] they will see patient on Friday this week then three times a week after that due to the new dressing that was started. Left message for son. Also to change appt from the 25th to the 1st @ 3pm with [facility].

## 2021-08-14 ASSESSMENT — ENCOUNTER SYMPTOMS
SHORTNESS OF BREATH: 0
WHEEZING: 0
EYE DISCHARGE: 0
CHEST TIGHTNESS: 0
ANAL BLEEDING: 0
APNEA: 0
SORE THROAT: 0
ABDOMINAL PAIN: 0
COLOR CHANGE: 0
ABDOMINAL DISTENTION: 0
SINUS PRESSURE: 0
EYE ITCHING: 0
CONSTIPATION: 0
EYE REDNESS: 0
DIARRHEA: 0
RHINORRHEA: 0
FACIAL SWELLING: 0
BLOOD IN STOOL: 0
STRIDOR: 0
NAUSEA: 0
EYE PAIN: 0
COUGH: 0
VOMITING: 0
ALLERGIC/IMMUNOLOGIC NEGATIVE: 1
VOICE CHANGE: 0
PHOTOPHOBIA: 0
CHOKING: 0
TROUBLE SWALLOWING: 0
BACK PAIN: 0
RECTAL PAIN: 0

## 2021-08-14 NOTE — PROGRESS NOTES
Enmanuel TuTanda (:  1935)     ASSESSMENT:  1.  Status post excisional debridement left lower quadrant abdominal wall necrotic wound (2021)     PLAN:  1. Continue wound care and packing changes. Wound is much more superficial.  Wide-based and not able to be closed easily in procedure room. Continue current treatment plan and allow wound to gradually heal in. No signs of infection or necrosis today. No need for debridement. 2.  Restrictions discussed with patient and son. All questions answered. 3.  Continue with home health. 4.  No need for antibiotics at this time  5. Follow-up in 2 weeks  6. Signs and symptoms reviewed with patient that would be concerning and need him to return to office for re-evaluation. Patient states He will call if He has questions or concerns. SUBJECTIVE/OBJECTIVE:    Chief Complaint   Patient presents with    Post-Op Check     Excisional debridement of skin and subcutaneous tissue of left sided abdominal wall necrotic tissue on 21-    Procedure     closure of abdominal wall wound     HPI  Riaz Tejada is an 61-year-old male who presents with his son for follow-up secondary to a open wound in the left lower abdominal wall quadrant. He had a necrotic infection requiring excisional debridement back in 2021. This has been being healed and by secondary intention. Original plan was for delayed closure in the procedure room however the wound is wide-based. Has been having home health with packing changes. They state this has been going well. No bleeding. No foul smell. No signs of infection. Off of antibiotics. Wound is much more superficial.  Current size 5 x 2 x 1 cm in diameter. No fever, chills or sweats. Tolerating diet. Normal bowel function. No hematochezia or melena. No new urinary complaints. Previous MRSA screenings have been negative x2. No other complaints today.     Review of Systems   Constitutional: Negative for activity change, appetite change, chills, diaphoresis, fatigue, fever and unexpected weight change. HENT: Negative for congestion, dental problem, drooling, ear discharge, ear pain, facial swelling, hearing loss, mouth sores, nosebleeds, postnasal drip, rhinorrhea, sinus pressure, sneezing, sore throat, tinnitus, trouble swallowing and voice change. Eyes: Negative for photophobia, pain, discharge, redness, itching and visual disturbance. Respiratory: Negative for apnea, cough, choking, chest tightness, shortness of breath, wheezing and stridor. Cardiovascular: Negative for chest pain, palpitations and leg swelling. Gastrointestinal: Negative for abdominal distention, abdominal pain, anal bleeding, blood in stool, constipation, diarrhea, nausea, rectal pain and vomiting. Endocrine: Negative. Genitourinary: Negative for decreased urine volume, difficulty urinating, discharge, dysuria, enuresis, flank pain, frequency, genital sores, hematuria, penile pain, penile swelling, scrotal swelling, testicular pain and urgency. Musculoskeletal: Negative for arthralgias, back pain, gait problem, joint swelling, myalgias, neck pain and neck stiffness. Skin: Positive for wound. Negative for color change, pallor and rash. Allergic/Immunologic: Negative. Neurological: Negative for dizziness, tremors, seizures, syncope, facial asymmetry, speech difficulty, weakness, light-headedness, numbness and headaches. Hematological: Negative for adenopathy. Does not bruise/bleed easily. Psychiatric/Behavioral: Negative for agitation, behavioral problems, confusion, decreased concentration, dysphoric mood, hallucinations, self-injury, sleep disturbance and suicidal ideas. The patient is not nervous/anxious and is not hyperactive.         Past Medical History:   Diagnosis Date    Arthritis     Asthma     Atrial fibrillation (HCC)     CHF (congestive heart failure) (HCC)     [facility]     Encephalopathy acute 11/20/2013    Hyperlipidemia     Hypertension     Muscular deconditioning 12/23/2013    Pneumonia        Past Surgical History:   Procedure Laterality Date    ABDOMEN SURGERY      ABDOMEN SURGERY N/A 7/11/2021    LEFT ABDOMEN WALL WOUND DEBRIDEMENT AND REPAIR performed by Federico Barker MD at [address] [facility] EKG 12-LEAD  8/5/2015         FRACTURE SURGERY  unsure   [address]  [facility]    OTHER SURGICAL HISTORY  10-30-13    abdominal exploration, closure of evisceration with retention sutures-Dr. Shasta Brown     OTHER SURGICAL HISTORY  10-25-13    Placement of gastrostomy tube-Dr. Shasta Brown        Current Outpatient Medications   Medication Sig Dispense Refill    metoprolol tartrate (LOPRESSOR) 25 MG tablet Take 2 tablets by mouth every 12 hours 60 tablet 3    sodium hypochlorite (DAKINS) 0.125 % SOLN external solution Pack wound with Dakin's soaked kerlix daily 1 Bottle 0    candesartan (ATACAND) 16 MG tablet Take 16 mg by mouth 2 times daily      vitamin B-12 (CYANOCOBALAMIN) 100 MCG tablet Take 1,000 mcg by mouth daily      polyethylene glycol (GLYCOLAX) 17 g packet Take 17 g by mouth daily as needed for Constipation      triamcinolone (KENALOG) 0.1 % cream Apply 1 each topically every 12 hours as needed (rash) Apply topically 2 times daily.       Cholecalciferol (VITAMIN D) 50 MCG (2000 UT) CAPS capsule Take 2,000 Units by mouth daily      pantoprazole (PROTONIX) 40 MG tablet Take 40 mg by mouth daily      SPIRIVA HANDIHALER 18 MCG inhalation capsule INHALE THE CONTENTS OF ONE CAPSULE BY MOUTH ONCE DAILY AS DIRECTED 30 capsule 5    docusate sodium (COLACE) 100 MG capsule TAKE ONE (1) CAPSULE BY MOUTH TWICE DAILY (Patient taking differently: Take 50 mg by mouth every 8 hours as needed for Constipation ) 60 capsule 5    XARELTO 20 MG TABS tablet TAKE ONE (1) TABLET BY MOUTH EACH EVENING WITH MEAL 30 tablet 5    albuterol sulfate HFA (PROAIR HFA) 108 (90 BASE) MCG/ACT inhaler INHALE 2 PUFFS BY MOUTH EVERY 4 HOURS AS NEEDED 1 Inhaler 5    LANOXIN 125 MCG tablet Take 1 tablet by mouth daily 30 tablet 5    traZODone (DESYREL) 150 MG tablet TAKE (1) TABLET BY MOUTH NIGHTLY AS NEEDED FOR SLEEP (Patient taking differently: Take 150 mg by mouth nightly Indications: Depression ) 30 tablet 5    fluticasone (FLONASE) 50 MCG/ACT nasal spray INSTILL 1 SPRAY INTRANASALLY DAILY (Patient taking differently: 2 times daily ) 16 g 11    clotrimazole (LOTRIMIN) 1 % cream APPLY TOPICALLY AS DIRECTED 45 g 5    spironolactone (ALDACTONE) 25 MG tablet TAKE 1 TABLET BY MOUTH ONCE DAILY 30 tablet 5    cloNIDine (CATAPRES) 0.2 MG tablet Take 1 tablet by mouth three times daily (Patient taking differently: Take 0.2 mg by mouth 2 times daily ) 180 tablet 3    SYMBICORT 160-4.5 MCG/ACT AERO INHALE 2 (TWO) PUFFS BY MOUTH TWICE DAILY 10.2 g 5    diltiazem (CARDIZEM CD) 120 MG ER capsule Take 1 capsule by mouth daily 90 capsule 3    furosemide (LASIX) 20 MG tablet Take 1 tablet by mouth daily 90 tablet 3    Lift Chair MISC by Does not apply route 1 each 0    AMITIZA 24 MCG capsule TAKE ONE (1) CAPSULE BY MOUTH ONCE DAILY WITH FOOD 30 capsule 11    Multiple Vitamins-Minerals (CERTAVITE SENIOR/ANTIOXIDANT PO) Take by mouth      polyvinyl alcohol (LIQUIFILM TEARS) 1.4 % ophthalmic solution 1 drop as needed      latanoprost (XALATAN) 0.005 % ophthalmic solution 1 drop nightly      magnesium hydroxide (MILK OF MAGNESIA CONCENTRATE) 2400 MG/10ML SUSP Take 2,400 mg by mouth once as needed      acetaminophen 650 MG TABS Take 650 mg by mouth every 4 hours as needed 120 tablet 3    albuterol (PROVENTIL) (2.5 MG/3ML) 0.083% nebulizer solution Take 3 mLs by nebulization every 6 hours as needed for Wheezing (Patient taking differently: Take 2.5 mg by nebulization 2 times daily ) 1 Package 3    guaiFENesin (ROBITUSSIN) 100 MG/5ML syrup Take 200 mg by mouth 4 times daily as needed for Cough or Congestion.  Nutritional Supplements (ENSURE COMPLETE) LIQD Take 1 Can by mouth daily. No current facility-administered medications for this visit. Allergies   Allergen Reactions    Metolazone      Causes hyponatremia (with high urine sodium)    Thiazide-Type Diuretics      Causes hyponatremia (with high urine sodium)       Family History   Problem Relation Age of Onset    Arthritis Father     Asthma Father     Heart Disease Father     High Blood Pressure Father     Cancer Mother         ovarian cancer    Diabetes Mother     Cancer Sister     Heart Disease Sister     Diabetes Brother     Cancer Sister     Heart Disease Sister     Heart Disease Brother     Diabetes Brother     Heart Disease Brother        Social History     Socioeconomic History    Marital status:      Spouse name: Tarah Rivera Number of children: 9    Years of education: 6    Highest education level: Not on file   Occupational History    Occupation: retired   Tobacco Use    Smoking status: Former Smoker     Packs/day: 2.00     Years: 30.00     Pack years: 60.00     Types: Cigarettes     Start date: 12/17/1978    Smokeless tobacco: Never Used   Substance and Sexual Activity    Alcohol use: No     Comment: 1 time a week    Drug use: No    Sexual activity: Not on file   Other Topics Concern    Not on file   Social History Narrative    Not on file     Social Determinants of Health     Financial Resource Strain:     Difficulty of Paying Living Expenses:    Food Insecurity:     Worried About Running Out of Food in the Last Year:     [address] in the Last Year:    Transportation Needs:     Lack of Transportation (Medical):      Lack of Transportation (Non-Medical):    Physical Activity:     Days of Exercise per Week:     Minutes of Exercise per Session:    Stress:     Feeling of Stress :    Social Connections:     Frequency of Communication with Friends and Family:     Frequency of Social Gatherings with Friends and Family:     Attends Anabaptism Services:     Active Member of Clubs or Organizations:     Attends Club or Organization Meetings:     Marital Status:    Intimate Partner Violence:     Fear of Current or Ex-Partner:     Emotionally Abused:     Physically Abused:     Sexually Abused:      Vitals:    08/11/21 1439   BP: 122/62   Site: Right Upper Arm   Position: Sitting   Cuff Size: Medium Adult   Pulse: 80   Resp: 18   Temp: 97.1 °F (36.2 °C)   TempSrc: Temporal   SpO2: 98%   Weight: 233 lb (105.7 kg)   Height: 6' 1\" (1.854 m)     Body mass index is 30.74 kg/m². Wt Readings from Last 3 Encounters:   08/11/21 233 lb (105.7 kg)   07/21/21 233 lb (105.7 kg)   07/07/21 233 lb 7.5 oz (105.9 kg)     Physical Exam  Vitals reviewed. Constitutional:       General: He is not in acute distress. Appearance: He is well-developed. He is not diaphoretic. HENT:      Head: Normocephalic and atraumatic. Right Ear: External ear normal.      Left Ear: External ear normal.      Nose: Nose normal.   Eyes:      General: No scleral icterus. Right eye: No discharge. Left eye: No discharge. Conjunctiva/sclera: Conjunctivae normal.   Cardiovascular:      Rate and Rhythm: Normal rate and regular rhythm. Heart sounds: Normal heart sounds. Pulmonary:      Effort: Pulmonary effort is normal. No respiratory distress. Breath sounds: Normal breath sounds. No wheezing or rales. Chest:      Chest wall: No tenderness. Abdominal:      General: Bowel sounds are normal. There is no distension. Palpations: Abdomen is soft. There is no mass. Tenderness: There is no abdominal tenderness. There is no guarding or rebound. Musculoskeletal:         General: No tenderness. Normal range of motion. Cervical back: Normal range of motion and neck supple. Skin:     General: Skin is warm and dry. Coloration: Skin is not pale.       Findings: No erythema or rash. Neurological:      Mental Status: He is alert and oriented to person, place, and time. Cranial Nerves: No cranial nerve deficit. Psychiatric:         Behavior: Behavior normal.         Thought Content: Thought content normal.         Judgment: Judgment normal.       Lab Results   Component Value Date    WBC 12.1 (H) 07/13/2021    HGB 10.3 (L) 07/13/2021    HCT 31.0 (L) 07/13/2021    MCV 93.4 07/13/2021     07/13/2021     Lab Results   Component Value Date     07/13/2021    K 4.3 07/13/2021    CL 99 07/13/2021    CO2 23 07/13/2021     Lab Results   Component Value Date    CREATININE 0.9 07/13/2021     Lab Results   Component Value Date    ALT 25 01/27/2017    AST 25 01/27/2017    ALKPHOS 52 01/27/2017    BILITOT 0.3 01/27/2017     Lab Results   Component Value Date    LIPASE 27.1 11/13/2016       Patient Active Problem List   Diagnosis    COPD (chronic obstructive pulmonary disease) with acute bronchitis ([facility])    Encephalopathy acute    Muscular deconditioning    Constipation, chronic    Dermatitis    COPD (chronic obstructive pulmonary disease) ([facility])    COPD with exacerbation ([facility])    Obesity (BMI 30-39. 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[facility])    Atrial fibrillation, chronic ([facility])    S/P cardiac pacemaker procedure    NSTEMI (non-ST elevated myocardial infarction) ([facility])    Respiratory failure with hypercapnia ([facility])    Pneumonia    Pulmonary edema cardiac cause ([facility])    Respiratory distress    COPD exacerbation ([facility])    CHF (congestive heart failure) ([facility])    Systolic CHF, acute on chronic ([facility])    Hyperglycemia    Hyponatremia    Abdominal wall abscess     An electronic signature was used to authenticate this note.     --Federico Barker MD

## 2021-09-01 ENCOUNTER — OFFICE VISIT (OUTPATIENT)
Dept: SURGERY | Age: 86
End: 2021-09-01

## 2021-09-01 ENCOUNTER — TELEPHONE (OUTPATIENT)
Dept: SURGERY | Age: 86
End: 2021-09-01

## 2021-09-01 VITALS
SYSTOLIC BLOOD PRESSURE: 118 MMHG | WEIGHT: 223 LBS | BODY MASS INDEX: 29.42 KG/M2 | HEART RATE: 75 BPM | OXYGEN SATURATION: 97 % | RESPIRATION RATE: 20 BRPM | TEMPERATURE: 97.3 F | DIASTOLIC BLOOD PRESSURE: 64 MMHG

## 2021-09-01 DIAGNOSIS — S31.109S OPEN ABDOMINAL WALL WOUND, SEQUELA: ICD-10-CM

## 2021-09-01 DIAGNOSIS — Z98.890 S/P EXCISIONAL DEBRIDEMENT: Primary | ICD-10-CM

## 2021-09-01 PROCEDURE — 99024 POSTOP FOLLOW-UP VISIT: CPT | Performed by: NURSE PRACTITIONER

## 2021-09-02 ASSESSMENT — ENCOUNTER SYMPTOMS
SINUS PRESSURE: 0
EYE PAIN: 0
CONSTIPATION: 0
VOMITING: 0
CHEST TIGHTNESS: 0
ALLERGIC/IMMUNOLOGIC NEGATIVE: 1
APNEA: 0
ABDOMINAL PAIN: 0
ANAL BLEEDING: 0
CHOKING: 0
SHORTNESS OF BREATH: 1
DIARRHEA: 0
COLOR CHANGE: 0
SORE THROAT: 0
RHINORRHEA: 0
ABDOMINAL DISTENTION: 0
PHOTOPHOBIA: 0
NAUSEA: 0
BACK PAIN: 0
EYE DISCHARGE: 0
BLOOD IN STOOL: 0
COUGH: 0
EYE ITCHING: 0
WHEEZING: 0

## 2021-09-02 NOTE — PROGRESS NOTES
[address] [facility]  [address] [facility] [address]  Dept: [phone]  Dept Fax: [phone]  Loc: [phone]    Visit Date: 9/1/2021    Sabiha Rodrigez is a 82 y.o. male who presents today for:  Chief Complaint   Patient presents with    Post-Op Check     s/p Excisional debridement of skin and subcutaneous tissue of left sided abdominal wall necrotic tissue-7/11/21 Last seen in the office on 8/11/2021       HPI:     HPI    Prince kaiser is a 70-year-old male who presents with his son for follow-up secondary to a open wound in the left lower abdominal wall quadrant. He had a necrotic infection requiring excisional debridement back in July 11, 2021. Original plan was for delayed closure in the procedure room however the wound is wide-based and healing well on it's own. Has been having home health with packing changes three days a week. They switched from dakin's to alginate dressing. Minimal bleeding. No foul smell. No signs of infection. Wound has healed in and now only superficial.  Current size 4 x 2 cm in diameter. No fever, chills or sweats. Tolerating diet. Normal bowel function. No hematochezia or melena. No new urinary complaints.   No complaints today.       Past Medical History:   Diagnosis Date    Arthritis     Asthma     Atrial fibrillation (HCC)     CHF (congestive heart failure) (HCC)     COPD (chronic obstructive pulmonary disease) ([facility] [address]     Encephalopathy acute 11/20/2013    Hyperlipidemia     Hypertension     Muscular deconditioning 12/23/2013    Pneumonia       Past Surgical History:   Procedure Laterality Date    ABDOMEN SURGERY      ABDOMEN SURGERY N/A 7/11/2021    LEFT ABDOMEN WALL WOUND DEBRIDEMENT AND REPAIR performed by Renee Adhikari MD at [address] [place] [address] EKG 12-LEAD  8/5/2015         FRACTURE SURGERY  unsure    [facility]PA[facility]  [facility]    OTHER SURGICAL HISTORY  10-30-13    abdominal exploration, closure of evisceration with retention sutures-Dr. Lopez Organ     OTHER SURGICAL HISTORY  10-25-13    Placement of gastrostomy tube-Dr. John Zapata        Family History   Problem Relation Age of Onset    Arthritis Father     Asthma Father     Heart Disease Father     High Blood Pressure Father     Cancer Mother         ovarian cancer    Diabetes Mother     Cancer Sister     Heart Disease Sister     Diabetes Brother     Cancer Sister     Heart Disease Sister     Heart Disease Brother     Diabetes Brother     Heart Disease Brother        Social History     Tobacco Use    Smoking status: Former Smoker     Packs/day: 2.00     Years: 30.00     Pack years: 60.00     Types: Cigarettes     Start date: 12/17/1978    Smokeless tobacco: Never Used   Substance Use Topics    Alcohol use: No     Comment: 1 time a week      Current Outpatient Medications   Medication Sig Dispense Refill    metoprolol tartrate (LOPRESSOR) 25 MG tablet Take 2 tablets by mouth every 12 hours 60 tablet 3    candesartan (ATACAND) 16 MG tablet Take 16 mg by mouth 2 times daily      vitamin B-12 (CYANOCOBALAMIN) 100 MCG tablet Take 1,000 mcg by mouth daily      triamcinolone (KENALOG) 0.1 % cream Apply 1 each topically every 12 hours as needed (rash) Apply topically 2 times daily.       Cholecalciferol (VITAMIN D) 50 MCG (2000 UT) CAPS capsule Take 2,000 Units by mouth daily      pantoprazole (PROTONIX) 40 MG tablet Take 40 mg by mouth daily      SPIRIVA HANDIHALER 18 MCG inhalation capsule INHALE THE CONTENTS OF ONE CAPSULE BY MOUTH ONCE DAILY AS DIRECTED 30 capsule 5    docusate sodium (COLACE) 100 MG capsule TAKE ONE (1) CAPSULE BY MOUTH TWICE DAILY (Patient taking differently: Take 50 mg by mouth every 8 hours as needed for Constipation ) 60 capsule 5    XARELTO 20 MG TABS tablet TAKE ONE (1) TABLET BY MOUTH EACH EVENING WITH MEAL 30 tablet 5    albuterol sulfate HFA (PROAIR HFA) 108 (90 BASE) MCG/ACT inhaler INHALE 2 PUFFS BY MOUTH EVERY 4 HOURS AS NEEDED 1 Inhaler 5    LANOXIN 125 MCG tablet Take 1 tablet by mouth daily 30 tablet 5    traZODone (DESYREL) 150 MG tablet TAKE (1) TABLET BY MOUTH NIGHTLY AS NEEDED FOR SLEEP (Patient taking differently: Take 150 mg by mouth nightly Indications: Depression ) 30 tablet 5    fluticasone (FLONASE) 50 MCG/ACT nasal spray INSTILL 1 SPRAY INTRANASALLY DAILY (Patient taking differently: 2 times daily ) 16 g 11    clotrimazole (LOTRIMIN) 1 % cream APPLY TOPICALLY AS DIRECTED 45 g 5    spironolactone (ALDACTONE) 25 MG tablet TAKE 1 TABLET BY MOUTH ONCE DAILY 30 tablet 5    cloNIDine (CATAPRES) 0.2 MG tablet Take 1 tablet by mouth three times daily (Patient taking differently: Take 0.2 mg by mouth 2 times daily ) 180 tablet 3    SYMBICORT 160-4.5 MCG/ACT AERO INHALE 2 (TWO) PUFFS BY MOUTH TWICE DAILY 10.2 g 5    diltiazem (CARDIZEM CD) 120 MG ER capsule Take 1 capsule by mouth daily 90 capsule 3    furosemide (LASIX) 20 MG tablet Take 1 tablet by mouth daily 90 tablet 3    Lift Chair MISC by Does not apply route 1 each 0    AMITIZA 24 MCG capsule TAKE ONE (1) CAPSULE BY MOUTH ONCE DAILY WITH FOOD 30 capsule 11    Multiple Vitamins-Minerals (CERTAVITE SENIOR/ANTIOXIDANT PO) Take by mouth      polyvinyl alcohol (LIQUIFILM TEARS) 1.4 % ophthalmic solution 1 drop as needed      latanoprost (XALATAN) 0.005 % ophthalmic solution 1 drop nightly      acetaminophen 650 MG TABS Take 650 mg by mouth every 4 hours as needed 120 tablet 3    albuterol (PROVENTIL) (2.5 MG/3ML) 0.083% nebulizer solution Take 3 mLs by nebulization every 6 hours as needed for Wheezing (Patient taking differently: Take 2.5 mg by nebulization 2 times daily ) 1 Package 3    sodium hypochlorite (DAKINS) 0.125 % SOLN external solution Pack wound with Dakin's soaked kerlix daily (Patient not taking: Reported on 9/1/2021) 1 Bottle 0    polyethylene glycol (GLYCOLAX) 17 g packet Take 17 g by mouth daily as needed for Constipation (Patient not taking: Reported on 9/1/2021)      magnesium hydroxide (MILK OF MAGNESIA CONCENTRATE) 2400 MG/10ML SUSP Take 2,400 mg by mouth once as needed (Patient not taking: Reported on 9/1/2021)      guaiFENesin (ROBITUSSIN) 100 MG/5ML syrup Take 200 mg by mouth 4 times daily as needed for Cough or Congestion. (Patient not taking: Reported on 9/1/2021)      Nutritional Supplements (ENSURE COMPLETE) LIQD Take 1 Can by mouth daily. (Patient not taking: Reported on 9/1/2021)       No current facility-administered medications for this visit. Allergies   Allergen Reactions    Metolazone      Causes hyponatremia (with high urine sodium)    Thiazide-Type Diuretics      Causes hyponatremia (with high urine sodium)       Subjective:     Review of Systems   Constitutional: Negative for activity change, appetite change, chills, diaphoresis, fatigue, fever and unexpected weight change. HENT: Negative for congestion, dental problem, hearing loss, rhinorrhea, sinus pressure and sore throat. Eyes: Negative for photophobia, pain, discharge, itching and visual disturbance. Respiratory: Positive for shortness of breath. Negative for apnea, cough, choking, chest tightness and wheezing. Cardiovascular: Negative for chest pain, palpitations and leg swelling. Gastrointestinal: Negative for abdominal distention, abdominal pain, anal bleeding, blood in stool, constipation, diarrhea, nausea and vomiting. Endocrine: Negative. Genitourinary: Negative for decreased urine volume, difficulty urinating, dysuria, frequency and urgency. Musculoskeletal: Positive for gait problem. Negative for arthralgias, back pain, joint swelling, myalgias and neck pain. Skin: Positive for wound. Negative for color change, pallor and rash. Allergic/Immunologic: Negative. Neurological: Negative for dizziness, tremors, weakness, numbness and headaches. Hematological: Negative.     Psychiatric/Behavioral: Negative. Objective:   /64 (Site: Left Upper Arm, Position: Sitting, Cuff Size: Medium Adult)   Pulse 75   Temp 97.3 °F (36.3 °C) (Infrared)   Resp 20   Wt 223 lb (101.2 kg)   SpO2 97%   BMI 29.42 kg/m²     Physical Exam  Vitals reviewed. Constitutional:       General: He is not in acute distress. Appearance: Normal appearance. He is well-developed. He is not ill-appearing or toxic-appearing. HENT:      Head: Normocephalic and atraumatic. Right Ear: External ear normal. Decreased hearing noted. Left Ear: External ear normal. Decreased hearing noted. Nose: Nose normal.      Mouth/Throat:      Mouth: Mucous membranes are not pale, not dry and not cyanotic. Eyes:      General: Lids are normal.   Neck:      Trachea: Trachea and phonation normal.   Cardiovascular:      Rate and Rhythm: Normal rate and regular rhythm. Pulses: Normal pulses. Heart sounds: S1 normal and S2 normal.   Pulmonary:      Effort: Pulmonary effort is normal. No tachypnea, bradypnea, accessory muscle usage or respiratory distress. Breath sounds: Normal breath sounds. No decreased breath sounds, wheezing or rales. Chest:      Chest wall: No tenderness. Abdominal:      General: Bowel sounds are normal. There is no distension. Palpations: Abdomen is soft. There is no mass. Tenderness: There is no abdominal tenderness. Musculoskeletal:         General: No tenderness. Normal range of motion. Cervical back: Normal range of motion and neck supple. Skin:     General: Skin is warm and dry. Findings: No abrasion, bruising, burn, ecchymosis, erythema, laceration, lesion or rash. Neurological:      Mental Status: He is alert and oriented to person, place, and time. Motor: No tremor, atrophy or abnormal muscle tone. Coordination: Coordination normal.      Gait: Gait normal.      Deep Tendon Reflexes: Reflexes are normal and symmetric.    Psychiatric: Speech: Speech normal.         Behavior: Behavior normal.         Thought Content: Thought content normal.        Patient Active Problem List   Diagnosis    COPD (chronic obstructive pulmonary disease) with acute bronchitis ([facility])    Encephalopathy acute    Muscular deconditioning    Constipation, chronic    Dermatitis    COPD ([facility] obstructive pulmonary disease) ([facility] [address]    COPD with exacerbation ([facility])    Obesity (BMI 30-39. 9)    COPD with exacerbation ([facility] [address]    Acute bronchitis    Hypertension    Atrial fibrillation ([facility])    SSS (sick sinus syndrome) ([facility])    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[facility])    Atrial fibrillation, chronic ([facility])    S/P cardiac pacemaker procedure    NSTEMI (non-ST elevated myocardial infarction) ([facility])    Respiratory failure with hypercapnia ([facility])    Pneumonia    Pulmonary edema cardiac cause ([facility])    Respiratory distress    COPD exacerbation ([facility])    CHF (congestive heart failure) ([facility])    Systolic CHF, acute on chronic ([facility])    Hyperglycemia    Hyponatremia    Abdominal wall abscess     Assessment:     1. Status post excisional debridement left lower quadrant abdominal wall necrotic wound (July 11, 2021)   2. MRSA infection - cleared per screenings    Plan:     1. Wound looks great. Continue alginate dressing 2-3 times a week. Wound care discussed. 2. No signs of infection  3. Okay to shower at this time  4. No restrictions from our standpoint  5. Follow up as needed based on how wound is healing. Nursing home to send us a picture in 2 week to monitor progress. Signs and symptoms reviewed with patient that would be concerning and need him to return to office for re-evaluation. Patient states he will call if he has questions or concerns.       Electronically signed by TREVA Payton CNP on 9/2/2021 at 2:00 PM

## 2021-09-15 NOTE — TELEPHONE ENCOUNTER
Rand Costello never received picture from [facility]. Called yesterday and spoke with Lorenzo Mccloud who attempted to email picture twice but never came through. Left voicemail with Paras Martinez this morning asking she send picture to Rand Costello via [employer].

## 2022-03-04 ENCOUNTER — HOSPITAL ENCOUNTER (INPATIENT)
Age: 87
LOS: 4 days | Discharge: HOME | DRG: 603 | End: 2022-03-08
Attending: INTERNAL MEDICINE | Admitting: INTERNAL MEDICINE
Payer: MEDICARE

## 2022-03-04 ENCOUNTER — APPOINTMENT (OUTPATIENT)
Dept: INTERVENTIONAL RADIOLOGY/VASCULAR | Age: 87
DRG: 603 | End: 2022-03-04
Payer: MEDICARE

## 2022-03-04 DIAGNOSIS — E87.1 HYPONATREMIA: ICD-10-CM

## 2022-03-04 DIAGNOSIS — L03.116 CELLULITIS OF LEFT LOWER EXTREMITY: Primary | ICD-10-CM

## 2022-03-04 DIAGNOSIS — I10 ESSENTIAL HYPERTENSION: ICD-10-CM

## 2022-03-04 PROBLEM — L03.119 CELLULITIS AND ABSCESS OF LEG: Status: ACTIVE | Noted: 2022-03-04

## 2022-03-04 PROBLEM — L02.419 CELLULITIS AND ABSCESS OF LEG: Status: ACTIVE | Noted: 2022-03-04

## 2022-03-04 LAB
ALBUMIN SERPL-MCNC: 3.8 G/DL (ref 3.5–5.1)
ALP BLD-CCNC: 65 U/L (ref 38–126)
ALT SERPL-CCNC: 23 U/L (ref 11–66)
ANION GAP SERPL CALCULATED.3IONS-SCNC: 10 MEQ/L (ref 8–16)
AST SERPL-CCNC: 22 U/L (ref 5–40)
BASOPHILS # BLD: 0.3 %
BASOPHILS ABSOLUTE: 0 THOU/MM3 (ref 0–0.1)
BILIRUB SERPL-MCNC: 0.3 MG/DL (ref 0.3–1.2)
BILIRUBIN DIRECT: < 0.2 MG/DL (ref 0–0.3)
BUN BLDV-MCNC: 13 MG/DL (ref 7–22)
CALCIUM SERPL-MCNC: 9.3 MG/DL (ref 8.5–10.5)
CHLORIDE BLD-SCNC: 88 MEQ/L (ref 98–111)
CO2: 27 MEQ/L (ref 23–33)
CREAT SERPL-MCNC: 0.8 MG/DL (ref 0.4–1.2)
EOSINOPHIL # BLD: 1.2 %
EOSINOPHILS ABSOLUTE: 0.2 THOU/MM3 (ref 0–0.4)
ERYTHROCYTE [DISTWIDTH] IN BLOOD BY AUTOMATED COUNT: 14.9 % (ref 11.5–14.5)
ERYTHROCYTE [DISTWIDTH] IN BLOOD BY AUTOMATED COUNT: 49.3 FL (ref 35–45)
GFR SERPL CREATININE-BSD FRML MDRD: > 90 ML/MIN/1.73M2
GLUCOSE BLD-MCNC: 103 MG/DL (ref 70–108)
HCT VFR BLD CALC: 36.9 % (ref 42–52)
HEMOGLOBIN: 12.5 GM/DL (ref 14–18)
IMMATURE GRANS (ABS): 0.2 THOU/MM3 (ref 0–0.07)
IMMATURE GRANULOCYTES: 1.3 %
LACTIC ACID: 1.3 MMOL/L (ref 0.5–2)
LIPASE: 26.6 U/L (ref 5.6–51.3)
LYMPHOCYTES # BLD: 6.4 %
LYMPHOCYTES ABSOLUTE: 1 THOU/MM3 (ref 1–4.8)
MCH RBC QN AUTO: 30.5 PG (ref 26–33)
MCHC RBC AUTO-ENTMCNC: 33.9 GM/DL (ref 32.2–35.5)
MCV RBC AUTO: 90 FL (ref 80–94)
MONOCYTES # BLD: 8.7 %
MONOCYTES ABSOLUTE: 1.3 THOU/MM3 (ref 0.4–1.3)
NUCLEATED RED BLOOD CELLS: 0 /100 WBC
OSMOLALITY CALCULATION: 251.9 MOSMOL/KG (ref 275–300)
PLATELET # BLD: 250 THOU/MM3 (ref 130–400)
PMV BLD AUTO: 10.1 FL (ref 9.4–12.4)
POTASSIUM SERPL-SCNC: 4.5 MEQ/L (ref 3.5–5.2)
RBC # BLD: 4.1 MILL/MM3 (ref 4.7–6.1)
SEG NEUTROPHILS: 82.1 %
SEGMENTED NEUTROPHILS ABSOLUTE COUNT: 12.2 THOU/MM3 (ref 1.8–7.7)
SODIUM BLD-SCNC: 125 MEQ/L (ref 135–145)
TOTAL PROTEIN: 6.7 G/DL (ref 6.1–8)
WBC # BLD: 14.9 THOU/MM3 (ref 4.8–10.8)

## 2022-03-04 PROCEDURE — 85025 COMPLETE CBC W/AUTO DIFF WBC: CPT

## 2022-03-04 PROCEDURE — 87040 BLOOD CULTURE FOR BACTERIA: CPT

## 2022-03-04 PROCEDURE — 82248 BILIRUBIN DIRECT: CPT

## 2022-03-04 PROCEDURE — 2580000003 HC RX 258: Performed by: NURSE PRACTITIONER

## 2022-03-04 PROCEDURE — 83605 ASSAY OF LACTIC ACID: CPT

## 2022-03-04 PROCEDURE — 96365 THER/PROPH/DIAG IV INF INIT: CPT

## 2022-03-04 PROCEDURE — 1200000000 HC SEMI PRIVATE

## 2022-03-04 PROCEDURE — 93971 EXTREMITY STUDY: CPT

## 2022-03-04 PROCEDURE — 6360000002 HC RX W HCPCS: Performed by: NURSE PRACTITIONER

## 2022-03-04 PROCEDURE — 96367 TX/PROPH/DG ADDL SEQ IV INF: CPT

## 2022-03-04 PROCEDURE — 36415 COLL VENOUS BLD VENIPUNCTURE: CPT

## 2022-03-04 PROCEDURE — 80053 COMPREHEN METABOLIC PANEL: CPT

## 2022-03-04 PROCEDURE — 83930 ASSAY OF BLOOD OSMOLALITY: CPT

## 2022-03-04 PROCEDURE — 87077 CULTURE AEROBIC IDENTIFY: CPT

## 2022-03-04 PROCEDURE — 99284 EMERGENCY DEPT VISIT MOD MDM: CPT

## 2022-03-04 PROCEDURE — 83690 ASSAY OF LIPASE: CPT

## 2022-03-04 RX ORDER — ACETAMINOPHEN 325 MG/1
650 TABLET ORAL EVERY 4 HOURS PRN
Status: DISCONTINUED | OUTPATIENT
Start: 2022-03-04 | End: 2022-03-08 | Stop reason: HOSPADM

## 2022-03-04 RX ORDER — DILTIAZEM HYDROCHLORIDE 120 MG/1
120 CAPSULE, COATED, EXTENDED RELEASE ORAL DAILY
Status: DISCONTINUED | OUTPATIENT
Start: 2022-03-05 | End: 2022-03-08 | Stop reason: HOSPADM

## 2022-03-04 RX ORDER — ONDANSETRON 2 MG/ML
4 INJECTION INTRAMUSCULAR; INTRAVENOUS EVERY 6 HOURS PRN
Status: DISCONTINUED | OUTPATIENT
Start: 2022-03-04 | End: 2022-03-08 | Stop reason: HOSPADM

## 2022-03-04 RX ORDER — IPRATROPIUM BROMIDE AND ALBUTEROL SULFATE 2.5; .5 MG/3ML; MG/3ML
1 SOLUTION RESPIRATORY (INHALATION) 4 TIMES DAILY
Status: DISCONTINUED | OUTPATIENT
Start: 2022-03-04 | End: 2022-03-05

## 2022-03-04 RX ORDER — FUROSEMIDE 10 MG/ML
40 INJECTION INTRAMUSCULAR; INTRAVENOUS 2 TIMES DAILY
Status: DISCONTINUED | OUTPATIENT
Start: 2022-03-05 | End: 2022-03-06

## 2022-03-04 RX ORDER — METOPROLOL TARTRATE 50 MG/1
25 TABLET, FILM COATED ORAL 2 TIMES DAILY
Status: DISCONTINUED | OUTPATIENT
Start: 2022-03-04 | End: 2022-03-08 | Stop reason: HOSPADM

## 2022-03-04 RX ADMIN — VANCOMYCIN HYDROCHLORIDE 1500 MG: 5 INJECTION, POWDER, LYOPHILIZED, FOR SOLUTION INTRAVENOUS at 22:30

## 2022-03-04 RX ADMIN — PIPERACILLIN AND TAZOBACTAM 3375 MG: 3; .375 INJECTION, POWDER, LYOPHILIZED, FOR SOLUTION INTRAVENOUS at 22:02

## 2022-03-04 NOTE — ED TRIAGE NOTES
Pt reports to ED c/o redness and swelling of the left lower leg that he noticed today. Pt denies any pain . Pt presents 2 cuts on the left heel, no bleeding present.

## 2022-03-05 LAB
ANION GAP SERPL CALCULATED.3IONS-SCNC: 11 MEQ/L (ref 8–16)
BILIRUBIN URINE: NEGATIVE
BLOOD, URINE: NEGATIVE
BUN BLDV-MCNC: 11 MG/DL (ref 7–22)
CALCIUM SERPL-MCNC: 9.1 MG/DL (ref 8.5–10.5)
CHARACTER, URINE: CLEAR
CHLORIDE BLD-SCNC: 93 MEQ/L (ref 98–111)
CO2: 22 MEQ/L (ref 23–33)
COLOR: YELLOW
CREAT SERPL-MCNC: 0.6 MG/DL (ref 0.4–1.2)
EKG Q-T INTERVAL: 402 MS
EKG QRS DURATION: 94 MS
EKG QTC CALCULATION (BAZETT): 414 MS
EKG R AXIS: 52 DEGREES
EKG T AXIS: 100 DEGREES
EKG VENTRICULAR RATE: 64 BPM
ERYTHROCYTE [DISTWIDTH] IN BLOOD BY AUTOMATED COUNT: 14.7 % (ref 11.5–14.5)
ERYTHROCYTE [DISTWIDTH] IN BLOOD BY AUTOMATED COUNT: 48.3 FL (ref 35–45)
GFR SERPL CREATININE-BSD FRML MDRD: > 90 ML/MIN/1.73M2
GLUCOSE BLD-MCNC: 106 MG/DL (ref 70–108)
GLUCOSE BLD-MCNC: 108 MG/DL (ref 70–108)
GLUCOSE BLD-MCNC: 115 MG/DL (ref 70–108)
GLUCOSE BLD-MCNC: 121 MG/DL (ref 70–108)
GLUCOSE BLD-MCNC: 122 MG/DL (ref 70–108)
GLUCOSE BLD-MCNC: 137 MG/DL (ref 70–108)
GLUCOSE URINE: NEGATIVE MG/DL
HCT VFR BLD CALC: 35.6 % (ref 42–52)
HEMOGLOBIN: 12.2 GM/DL (ref 14–18)
KETONES, URINE: NEGATIVE
LEUKOCYTE ESTERASE, URINE: NEGATIVE
MCH RBC QN AUTO: 30.7 PG (ref 26–33)
MCHC RBC AUTO-ENTMCNC: 34.3 GM/DL (ref 32.2–35.5)
MCV RBC AUTO: 89.7 FL (ref 80–94)
NITRITE, URINE: NEGATIVE
OSMOLALITY CALCULATION: 253.2 MOSMOL/KG (ref 275–300)
OSMOLALITY URINE: 260 MOSMOL/KG (ref 250–750)
OSMOLALITY: 268 MOSMOL/KG (ref 275–295)
PH UA: 7.5 (ref 5–9)
PLATELET # BLD: 242 THOU/MM3 (ref 130–400)
PMV BLD AUTO: 10.3 FL (ref 9.4–12.4)
POTASSIUM SERPL-SCNC: 4.2 MEQ/L (ref 3.5–5.2)
PRO-BNP: 1104 PG/ML (ref 0–1800)
PROTEIN UA: NEGATIVE
RBC # BLD: 3.97 MILL/MM3 (ref 4.7–6.1)
SODIUM BLD-SCNC: 126 MEQ/L (ref 135–145)
SODIUM URINE: 75 MEQ/L
SPECIFIC GRAVITY, URINE: 1.01 (ref 1–1.03)
TSH SERPL DL<=0.05 MIU/L-ACNC: 1.16 UIU/ML (ref 0.4–4.2)
UROBILINOGEN, URINE: 0.2 EU/DL (ref 0–1)
WBC # BLD: 16.3 THOU/MM3 (ref 4.8–10.8)

## 2022-03-05 PROCEDURE — 84300 ASSAY OF URINE SODIUM: CPT

## 2022-03-05 PROCEDURE — 36415 COLL VENOUS BLD VENIPUNCTURE: CPT

## 2022-03-05 PROCEDURE — 6370000000 HC RX 637 (ALT 250 FOR IP): Performed by: INTERNAL MEDICINE

## 2022-03-05 PROCEDURE — 2580000003 HC RX 258: Performed by: INTERNAL MEDICINE

## 2022-03-05 PROCEDURE — 85027 COMPLETE CBC AUTOMATED: CPT

## 2022-03-05 PROCEDURE — 83935 ASSAY OF URINE OSMOLALITY: CPT

## 2022-03-05 PROCEDURE — 81003 URINALYSIS AUTO W/O SCOPE: CPT

## 2022-03-05 PROCEDURE — 83880 ASSAY OF NATRIURETIC PEPTIDE: CPT

## 2022-03-05 PROCEDURE — 94640 AIRWAY INHALATION TREATMENT: CPT

## 2022-03-05 PROCEDURE — 94760 N-INVAS EAR/PLS OXIMETRY 1: CPT

## 2022-03-05 PROCEDURE — 82948 REAGENT STRIP/BLOOD GLUCOSE: CPT

## 2022-03-05 PROCEDURE — 84443 ASSAY THYROID STIM HORMONE: CPT

## 2022-03-05 PROCEDURE — 93005 ELECTROCARDIOGRAM TRACING: CPT | Performed by: INTERNAL MEDICINE

## 2022-03-05 PROCEDURE — 80048 BASIC METABOLIC PNL TOTAL CA: CPT

## 2022-03-05 PROCEDURE — 1200000000 HC SEMI PRIVATE

## 2022-03-05 PROCEDURE — 6360000002 HC RX W HCPCS: Performed by: INTERNAL MEDICINE

## 2022-03-05 RX ORDER — IPRATROPIUM BROMIDE AND ALBUTEROL SULFATE 2.5; .5 MG/3ML; MG/3ML
1 SOLUTION RESPIRATORY (INHALATION) 2 TIMES DAILY
Status: DISCONTINUED | OUTPATIENT
Start: 2022-03-05 | End: 2022-03-08 | Stop reason: HOSPADM

## 2022-03-05 RX ORDER — FLUOXETINE 10 MG/1
10 CAPSULE ORAL DAILY
Status: ON HOLD | COMMUNITY
End: 2022-03-08 | Stop reason: HOSPADM

## 2022-03-05 RX ORDER — LATANOPROST 50 UG/ML
1 SOLUTION/ DROPS OPHTHALMIC NIGHTLY
Status: DISCONTINUED | OUTPATIENT
Start: 2022-03-05 | End: 2022-03-08 | Stop reason: HOSPADM

## 2022-03-05 RX ORDER — ALBUTEROL SULFATE 2.5 MG/3ML
2.5 SOLUTION RESPIRATORY (INHALATION) EVERY 6 HOURS PRN
Status: DISCONTINUED | OUTPATIENT
Start: 2022-03-05 | End: 2022-03-08 | Stop reason: HOSPADM

## 2022-03-05 RX ORDER — DIGOXIN 125 MCG
125 TABLET ORAL DAILY
Status: DISCONTINUED | OUTPATIENT
Start: 2022-03-05 | End: 2022-03-08 | Stop reason: HOSPADM

## 2022-03-05 RX ORDER — VITAMIN B COMPLEX
2000 TABLET ORAL DAILY
Status: DISCONTINUED | OUTPATIENT
Start: 2022-03-05 | End: 2022-03-08 | Stop reason: HOSPADM

## 2022-03-05 RX ORDER — FLUOXETINE 10 MG/1
10 CAPSULE ORAL DAILY
Status: DISCONTINUED | OUTPATIENT
Start: 2022-03-05 | End: 2022-03-07

## 2022-03-05 RX ORDER — LUBIPROSTONE 24 UG/1
24 CAPSULE, GELATIN COATED ORAL
Status: DISCONTINUED | OUTPATIENT
Start: 2022-03-06 | End: 2022-03-08 | Stop reason: HOSPADM

## 2022-03-05 RX ORDER — CLOBETASOL PROPIONATE 0.05 MG/G
1 GEL TOPICAL DAILY
COMMUNITY

## 2022-03-05 RX ORDER — POLYVINYL ALCOHOL 14 MG/ML
1 SOLUTION/ DROPS OPHTHALMIC PRN
Status: DISCONTINUED | OUTPATIENT
Start: 2022-03-05 | End: 2022-03-08 | Stop reason: HOSPADM

## 2022-03-05 RX ORDER — DIPHENHYDRAMINE HCL 25 MG
25 TABLET ORAL EVERY 8 HOURS PRN
Status: DISCONTINUED | OUTPATIENT
Start: 2022-03-05 | End: 2022-03-08 | Stop reason: HOSPADM

## 2022-03-05 RX ORDER — PANTOPRAZOLE SODIUM 40 MG/1
40 TABLET, DELAYED RELEASE ORAL DAILY
Status: DISCONTINUED | OUTPATIENT
Start: 2022-03-05 | End: 2022-03-08 | Stop reason: HOSPADM

## 2022-03-05 RX ADMIN — METOPROLOL TARTRATE 25 MG: 50 TABLET, FILM COATED ORAL at 02:31

## 2022-03-05 RX ADMIN — RIVAROXABAN 20 MG: 20 TABLET, FILM COATED ORAL at 02:32

## 2022-03-05 RX ADMIN — IPRATROPIUM BROMIDE AND ALBUTEROL SULFATE 1 AMPULE: .5; 3 SOLUTION RESPIRATORY (INHALATION) at 09:43

## 2022-03-05 RX ADMIN — DIGOXIN 125 MCG: 125 TABLET ORAL at 16:33

## 2022-03-05 RX ADMIN — DILTIAZEM HYDROCHLORIDE 120 MG: 120 CAPSULE, EXTENDED RELEASE ORAL at 11:55

## 2022-03-05 RX ADMIN — FUROSEMIDE 40 MG: 10 INJECTION, SOLUTION INTRAMUSCULAR; INTRAVENOUS at 18:40

## 2022-03-05 RX ADMIN — PANTOPRAZOLE SODIUM 40 MG: 40 TABLET, DELAYED RELEASE ORAL at 16:33

## 2022-03-05 RX ADMIN — PIPERACILLIN AND TAZOBACTAM 3375 MG: 3; .375 INJECTION, POWDER, LYOPHILIZED, FOR SOLUTION INTRAVENOUS at 03:39

## 2022-03-05 RX ADMIN — METOPROLOL TARTRATE 25 MG: 50 TABLET, FILM COATED ORAL at 11:55

## 2022-03-05 RX ADMIN — LATANOPROST 1 DROP: 50 SOLUTION OPHTHALMIC at 21:00

## 2022-03-05 RX ADMIN — VANCOMYCIN HYDROCHLORIDE 1250 MG: 5 INJECTION, POWDER, LYOPHILIZED, FOR SOLUTION INTRAVENOUS at 18:40

## 2022-03-05 RX ADMIN — PIPERACILLIN AND TAZOBACTAM 3375 MG: 3; .375 INJECTION, POWDER, LYOPHILIZED, FOR SOLUTION INTRAVENOUS at 14:43

## 2022-03-05 RX ADMIN — IPRATROPIUM BROMIDE AND ALBUTEROL SULFATE 1 AMPULE: .5; 3 SOLUTION RESPIRATORY (INHALATION) at 17:47

## 2022-03-05 RX ADMIN — RIVAROXABAN 20 MG: 20 TABLET, FILM COATED ORAL at 20:59

## 2022-03-05 RX ADMIN — METOPROLOL TARTRATE 25 MG: 50 TABLET, FILM COATED ORAL at 20:59

## 2022-03-05 RX ADMIN — FUROSEMIDE 40 MG: 10 INJECTION, SOLUTION INTRAMUSCULAR; INTRAVENOUS at 11:55

## 2022-03-05 RX ADMIN — PIPERACILLIN AND TAZOBACTAM 3375 MG: 3; .375 INJECTION, POWDER, LYOPHILIZED, FOR SOLUTION INTRAVENOUS at 21:04

## 2022-03-05 RX ADMIN — Medication 2000 UNITS: at 16:33

## 2022-03-05 RX ADMIN — IPRATROPIUM BROMIDE AND ALBUTEROL SULFATE 1 AMPULE: .5; 3 SOLUTION RESPIRATORY (INHALATION) at 01:36

## 2022-03-05 RX ADMIN — FLUOXETINE HYDROCHLORIDE 10 MG: 10 CAPSULE ORAL at 16:33

## 2022-03-05 ASSESSMENT — PAIN SCALES - GENERAL
PAINLEVEL_OUTOF10: 3
PAINLEVEL_OUTOF10: 0

## 2022-03-05 NOTE — ED PROVIDER NOTES
[facility] Emergency Department    CHIEF COMPLAINT       Chief Complaint   Patient presents with    Leg Swelling       Nurses Notes reviewed and I agree except as noted in the HPI. HISTORY OF PRESENT ILLNESS    Latisha Prescott is a 82 y.o. male who presents to the ED for evaluation of left leg swelling and redness. He states he noticed the redness when he woke up this morning and it felt more swollen than normal. He denies the presence of any pain. The patient states the redness and swelling began in the foot and lower shin but has now progressed up his entire shin with the presence of linear red lines running up the knee. The patient denies any specific event or causal agent making the redness or swelling worse. He states the nursing facility takes care of his medication which he did take today. Patients denies redness or swelling present anywhere else in the body. The patient denies any associated symptoms such as fever, chills, night sweats, cramping or stabbing pain in the calf, headache, dizziness, chest pain, shortness of breath, abdominal or flank pain, nausea, vomiting, or diarrhea. HPI was provided by the patient and caretaker    REVIEW OF SYSTEMS     Review of Systems   Constitutional: Negative for chills, fatigue and fever. HENT: Negative for congestion, ear discharge, ear pain, postnasal drip and rhinorrhea. Eyes: Negative for redness. Respiratory: Negative for cough and chest tightness. Cardiovascular: Positive for leg swelling. Negative for chest pain. Gastrointestinal: Negative for abdominal pain, nausea and vomiting. Genitourinary: Negative for difficulty urinating, dysuria, enuresis, flank pain and hematuria. Musculoskeletal: Negative for back pain and joint swelling. Skin: Positive for color change and rash. Neurological: Negative for dizziness, light-headedness, numbness and headaches.    Psychiatric/Behavioral: Negative for agitation, behavioral problems and confusion. All other systems negative except as noted. PAST MEDICAL HISTORY     Past Medical History:   Diagnosis Date    Arthritis     Asthma     [facility] fibrillation ([facility]     [facility]     COPD (chronic obstructive [facility] disease) ([facility]     Encephalopathy acute 11/20/2013    Hyperlipidemia     Hypertension     Muscular deconditioning 12/23/2013    Pneumonia        SURGICALHISTORY      has a past surgical history that includes fracture surgery (unsure); hernia repair (2013); other surgical history (10-30-13); other surgical history (10-25-13); Abdomen surgery; EKG 12 Lead (8/5/2015); and Abdomen surgery (N/A, 7/11/2021).     CURRENT MEDICATIONS       Discharge Medication List as of 3/8/2022 12:38 PM      CONTINUE these medications which have NOT CHANGED    Details   clobetasol propionate 0.05 % GEL gel Apply 1 applicator topically dailyHistorical Med      LACTOBACILLUS PO Take 1 tablet by mouth 2 times dailyHistorical Med      Propylene Glycol (SYSTANE COMPLETE OP) Apply 1 drop to eye 4 times dailyHistorical Med      metoprolol tartrate (LOPRESSOR) 25 MG tablet Take 2 tablets by mouth every 12 hours, Disp-60 tablet, R-3NO PRINT      candesartan (ATACAND) 16 MG tablet Take 16 mg by mouth 2 times dailyHistorical Med      vitamin B-12 (CYANOCOBALAMIN) 100 MCG tablet Take 1,000 mcg by mouth dailyHistorical Med      Cholecalciferol (VITAMIN D) 50 MCG (2000 UT) CAPS capsule Take 2,000 Units by mouth dailyHistorical Med      pantoprazole (PROTONIX) 40 MG tablet Take 40 mg by mouth daily      SPIRIVA HANDIHALER 18 MCG inhalation capsule INHALE THE CONTENTS OF ONE CAPSULE BY MOUTH ONCE DAILY AS DIRECTED, Disp-30 capsule, R-5      docusate sodium (COLACE) 100 MG capsule TAKE ONE (1) CAPSULE BY MOUTH TWICE DAILY, Disp-60 capsule, R-5      LANOXIN 125 MCG tablet Take 1 tablet by mouth daily, Disp-30 tablet, R-5, FAIZA      traZODone (DESYREL) 150 MG tablet TAKE (1) TABLET BY MOUTH NIGHTLY AS NEEDED FOR SLEEP, Disp-30 tablet, R-5      fluticasone (FLONASE) 50 MCG/ACT nasal spray INSTILL 1 SPRAY INTRANASALLY DAILY, Disp-16 g, R-11      clotrimazole (LOTRIMIN) 1 % cream APPLY TOPICALLY AS DIRECTED, Disp-45 g, R-5, Normal      spironolactone (ALDACTONE) 25 MG tablet TAKE 1 TABLET BY MOUTH ONCE DAILY, Disp-30 tablet, R-5      SYMBICORT 160-4.5 MCG/ACT AERO INHALE 2 (TWO) PUFFS BY MOUTH TWICE DAILY, Disp-10.2 g, R-5      diltiazem (CARDIZEM CD) 120 MG ER capsule Take 1 capsule by mouth daily, Disp-90 capsule, R-3      AMITIZA 24 MCG capsule TAKE ONE (1) CAPSULE BY MOUTH ONCE DAILY WITH FOOD, Disp-30 capsule, R-11      Multiple Vitamins-Minerals (CERTAVITE SENIOR/ANTIOXIDANT PO) Take by mouth      latanoprost (XALATAN) 0.005 % ophthalmic solution 1 drop nightly      albuterol (PROVENTIL) (2.5 MG/3ML) 0.083% nebulizer solution Take 3 mLs by nebulization every 6 hours as needed for Wheezing, Disp-1 Package, R-3      Nutritional Supplements (ENSURE COMPLETE) LIQD Take 1 Can by mouth daily Historical Med      triamcinolone (KENALOG) 0.1 % cream Apply 1 each topically every 12 hours as needed (rash) Apply topically 2 times daily. , Topical, EVERY 12 HOURS PRN, Historical Med      XARELTO 20 MG TABS tablet TAKE ONE (1) TABLET BY MOUTH EACH EVENING WITH MEAL, Disp-30 tablet, R-5      albuterol sulfate HFA (PROAIR HFA) 108 (90 BASE) MCG/ACT inhaler INHALE 2 PUFFS BY MOUTH EVERY 4 HOURS AS NEEDED, Disp-1 Inhaler, R-5      Lift Chair MISC Starting 1/11/2016, Until Discontinued, Disp-1 each, R-0, Print      polyvinyl alcohol (LIQUIFILM TEARS) 1.4 % ophthalmic solution 1 drop as needed      magnesium hydroxide (MILK OF MAGNESIA CONCENTRATE) 2400 MG/10ML SUSP Take 2,400 mg by mouth once as needed, Oral, ONCE PRN, Until Discontinued, Historical Med      acetaminophen 650 MG TABS Take 650 mg by mouth every 4 hours as needed, Disp-120 tablet, R-3      guaiFENesin (ROBITUSSIN) 100 MG/5ML syrup Take 200 mg by mouth 4 times daily as needed for Cough or Congestion. ALLERGIES     is allergic to metolazone and thiazide-type diuretics. FAMILY HISTORY     He indicated that his mother is . He indicated that his father is . He indicated that both of his sisters are . He indicated that all of his three brothers are . family history includes Arthritis in his father; Asthma in his father; Cancer in his mother, sister, and sister; Diabetes in his brother, brother, and mother; Heart Disease in his brother, brother, father, sister, and sister; High Blood Pressure in his father. SOCIAL HISTORY       Social History     Socioeconomic History    Marital status:      Spouse name: Dk Del Real Number of children: 9    Years of education: 8    Highest education level: Not on file   Occupational History    Occupation: retired   Tobacco Use    Smoking status: Former Smoker     Packs/day: 2.00     Years: 30.00     Pack years: 60.00     Types: Cigarettes     Start date: 1978    Smokeless tobacco: Never Used   Vaping Use    Vaping Use: Never used   Substance and Sexual Activity    Alcohol use: No     Comment: 1 time a week    Drug use: No    Sexual activity: Not on file   Other Topics Concern    Not on file   Social History Narrative    Not on file     Social Determinants of Health     Financial Resource Strain:     Difficulty of Paying Living Expenses: Not on file   Food Insecurity:     Worried About [address] in the Last Year: Not on file    [address] in the Last Year: Not on file   Transportation Needs:     Lack of Transportation (Medical): Not on file    Lack of Transportation (Non-Medical):  Not on file   Physical Activity:     Days of Exercise per Week: Not on file    Minutes of Exercise per Session: Not on file   Stress:     Feeling of Stress : Not on file   Social Connections:     Frequency of Communication with Friends and Family: Not on file    Frequency of Social Gatherings with Friends and Family: Not on file    Attends Zoroastrian Services: Not on file    Active Member of Clubs or Organizations: Not on file    Attends Club or Organization Meetings: Not on file    Marital Status: Not on file   Intimate Partner Violence:     Fear of Current or Ex-Partner: Not on file    Emotionally Abused: Not on file    Physically Abused: Not on file    Sexually Abused: Not on file   Housing Stability:     Unable to Pay for Housing in the Last Year: Not on file    Number of [place] in the Last Year: Not on file    Unstable Housing in the Last Year: Not on file       PHYSICAL EXAM     INITIAL VITALS:  height is 6' 1\" (1.854 m) and weight is 231 lb 1.6 oz (104.8 kg). His oral temperature is 98.2 °F (36.8 °C). His blood pressure is 132/80 and his pulse is 80. His respiration is 16 and oxygen saturation is 97%. Physical Exam  Vitals and nursing note reviewed. Constitutional:       General: He is not in acute distress. Appearance: Normal appearance. He is well-developed. He is not ill-appearing or diaphoretic. HENT:      Head: Normocephalic and atraumatic. Nose: Nose normal.      Mouth/Throat:      Mouth: Mucous membranes are moist.      Pharynx: Oropharynx is clear. Eyes:      General:         Right eye: No discharge. Left eye: No discharge. Conjunctiva/sclera: Conjunctivae normal.   Neck:      Trachea: No tracheal deviation. Cardiovascular:      Rate and Rhythm: Normal rate. Rhythm irregularly irregular. Pulses: Normal pulses. Heart sounds: Normal heart sounds. No murmur heard. No gallop. Comments: Normal capillary refill  Pulmonary:      Effort: Pulmonary effort is normal. No respiratory distress. Breath sounds: Normal breath sounds. No stridor. Abdominal:      General: Bowel sounds are normal.      Palpations: Abdomen is soft. Musculoskeletal:         General: No tenderness or deformity. Normal range of motion. Cervical back: Normal range of motion. Left lower leg: Edema present. Skin:     General: Skin is warm and dry. Capillary Refill: Capillary refill takes less than 2 seconds. Coloration: Skin is not pale. Findings: Erythema (LLE) and rash present. Neurological:      General: No focal deficit present. Mental Status: He is alert and oriented to person, place, and time. Cranial Nerves: No cranial nerve deficit. Psychiatric:         Behavior: Behavior normal.         DIFFERENTIAL DIAGNOSIS:   DVT, Cellulitis, phlebitis    DIAGNOSTIC RESULTS     EKG: All EKG's are interpreted by the Emergency Department Physician who eithersigns or Co-signs this chart in the absence of a cardiologist.        RADIOLOGY: non-plainfilm images(s) such as CT, Ultrasound and MRI are read by the radiologist.  Plain radiographic images are visualized and preliminarily interpreted by the emergency physician unless otherwise stated below. VL DUP LOWER EXTREMITY VENOUS LEFT   Final Result   1. No acute DVT in the left lower extremity. This document has been electronically signed by: Farzaneh Mehta MD on    03/04/2022 10:45 PM      Technique Used: Duplex examination performed utilizing grayscale, color    and spectral analysis.             LABS:   Labs Reviewed   CULTURE, BLOOD 1 - Abnormal; Notable for the following components:       Result Value    Blood Culture, Routine No growth-preliminary  (*)     Organism gram positive bacilli (*)     Organism Clostridium species (*)     All other components within normal limits    Narrative:     Source: blood-Adult-suboptimal <5.5oz./set volume       Site: Peripheral Vein            Current Antibiotics: not stated   CBC WITH AUTO DIFFERENTIAL - Abnormal; Notable for the following components:    WBC 14.9 (*)     RBC 4.10 (*)     Hemoglobin 12.5 (*)     Hematocrit 36.9 (*)     RDW-CV 14.9 (*)     RDW-SD 49.3 (*)     Segs Absolute 12.2 (*) Immature Grans (Abs) 0.20 (*)     All other components within normal limits   BASIC METABOLIC PANEL - Abnormal; Notable for the following components:    Sodium 125 (*)     Chloride 88 (*)     All other components within normal limits   OSMOLALITY - Abnormal; Notable for the following components:    Osmolality Calc 251.9 (*)     All other components within normal limits   OSMOLALITY, SERUM - Abnormal; Notable for the following components:    Osmolality 268 (*)     All other components within normal limits   CBC - Abnormal; Notable for the following components:    WBC 16.3 (*)     RBC 3.97 (*)     Hemoglobin 12.2 (*)     Hematocrit 35.6 (*)     RDW-CV 14.7 (*)     RDW-SD 48.3 (*)     All other components within normal limits   BASIC METABOLIC PANEL - Abnormal; Notable for the following components:    Sodium 126 (*)     Chloride 93 (*)     CO2 22 (*)     All other components within normal limits   OSMOLALITY - Abnormal; Notable for the following components:    Osmolality Calc 253.2 (*)     All other components within normal limits   CBC - Abnormal; Notable for the following components:    WBC 13.2 (*)     RBC 3.99 (*)     Hemoglobin 12.4 (*)     Hematocrit 36.0 (*)     RDW-CV 14.9 (*)     RDW-SD 49.2 (*)     All other components within normal limits   BASIC METABOLIC PANEL - Abnormal; Notable for the following components:    Sodium 126 (*)     Chloride 89 (*)     All other components within normal limits   BASIC METABOLIC PANEL - Abnormal; Notable for the following components:    Sodium 127 (*)     Chloride 89 (*)     All other components within normal limits   CBC - Abnormal; Notable for the following components:    WBC 14.2 (*)     RBC 4.07 (*)     Hemoglobin 12.5 (*)     Hematocrit 36.3 (*)     RDW-CV 14.7 (*)     RDW-SD 48.1 (*)     All other components within normal limits   CBC - Abnormal; Notable for the following components:    WBC 12.8 (*)     RBC 3.93 (*)     Hemoglobin 12.0 (*)     Hematocrit 34.8 (*) RDW-SD 47.4 (*)     All other components within normal limits   BASIC METABOLIC PANEL - Abnormal; Notable for the following components:    Sodium 128 (*)     Chloride 91 (*)     All other components within normal limits   SEDIMENTATION RATE - Abnormal; Notable for the following components:    Sed Rate 70 (*)     All other components within normal limits   C-REACTIVE PROTEIN - Abnormal; Notable for the following components:    CRP 3.52 (*)     All other components within normal limits   POCT GLUCOSE - Abnormal; Notable for the following components:    POC Glucose 121 (*)     All other components within normal limits   POCT GLUCOSE - Abnormal; Notable for the following components:    POC Glucose 122 (*)     All other components within normal limits   POCT GLUCOSE - Abnormal; Notable for the following components:    POC Glucose 115 (*)     All other components within normal limits   POCT GLUCOSE - Abnormal; Notable for the following components:    POC Glucose 137 (*)     All other components within normal limits   POCT GLUCOSE - Abnormal; Notable for the following components:    POC Glucose 110 (*)     All other components within normal limits   POCT GLUCOSE - Abnormal; Notable for the following components:    POC Glucose 134 (*)     All other components within normal limits   POCT GLUCOSE - Abnormal; Notable for the following components:    POC Glucose 128 (*)     All other components within normal limits   POCT GLUCOSE - Abnormal; Notable for the following components:    POC Glucose 120 (*)     All other components within normal limits   POCT GLUCOSE - Abnormal; Notable for the following components:    POC Glucose 184 (*)     All other components within normal limits   POCT GLUCOSE - Abnormal; Notable for the following components:    POC Glucose 131 (*)     All other components within normal limits   POCT GLUCOSE - Abnormal; Notable for the following components:    POC Glucose 143 (*)     All other components within normal limits   POCT GLUCOSE - Abnormal; Notable for the following components:    POC Glucose 112 (*)     All other components within normal limits   CULTURE, BLOOD 2    Narrative:     Source: blood-Adult-suboptimal <5.5oz./set volume       Site: Peripheral Vein            Current Antibiotics: not stated   LIPASE   HEPATIC FUNCTION PANEL   LACTIC ACID   ANION GAP   GLOMERULAR FILTRATION RATE, ESTIMATED   URINALYSIS WITH REFLEX TO CULTURE   SODIUM, URINE, RANDOM   OSMOLALITY, URINE   TSH   BRAIN NATRIURETIC PEPTIDE   ANION GAP   GLOMERULAR FILTRATION RATE, ESTIMATED   VANCOMYCIN LEVEL, RANDOM   ANION GAP   GLOMERULAR FILTRATION RATE, ESTIMATED   DIGOXIN LEVEL   ANION GAP   GLOMERULAR FILTRATION RATE, ESTIMATED   ANION GAP   GLOMERULAR FILTRATION RATE, ESTIMATED   POCT GLUCOSE   POCT GLUCOSE   POCT GLUCOSE       EMERGENCY DEPARTMENT COURSE:   Vitals:    Vitals:    03/08/22 0456 03/08/22 0758 03/08/22 0956 03/08/22 1230   BP: 136/76 136/84  132/80   Pulse: 72 76  80   Resp: 15 16 16 16   Temp: 97.9 °F (36.6 °C) 98.6 °F (37 °C)  98.2 °F (36.8 °C)   TempSrc: Oral Oral  Oral   SpO2: 96% 98% 96% 97%   Weight:       Height:                                 Internal Administration   First Dose      Second Dose           Last COVID Lab No results found for: SARS-COV-2, SARS-COV-2 RNA, SARS-COV-2, SARS-COV-2, SARS-COV-2 BY PCR, SARS-COV-2, SARS-COV-2, SARS-COV-2         MDM    Patient was seen evaluate the emergency room for left leg swelling and redness. Appropriate labs and imaging ordered and reviewed. No DVT is seen on the scan. This appears to be cellulitis. He is treated with Zosyn and vancomycin. Admitted to Dr. Yamile Rai for further evaluation and treatment.     Medications   vancomycin (VANCOCIN) 1,500 mg in dextrose 5 % 500 mL IVPB (0 mg IntraVENous Stopped 3/5/22 0030)   piperacillin-tazobactam (ZOSYN) 3,375 mg in dextrose 5 % 50 mL IVPB (mini-bag) (0 mg IntraVENous Stopped 3/4/22 2230)       Please note that the patient was evaluated during a pandemic. All efforts were made for HIPPA compliance as well as provision of appropriate care. Patient was seen independently by myself. The patient's final impression and disposition and plan was determined by myself. Strict return precautions and follow up instructions were discussed with the patient prior to discharge, with which the patient agrees. Physical assessment findings, diagnostic testing(s) if applicable, and vital signs reviewed with patient/patient representative. Questions answered. Medications asdirected, including OTC medications for supportive care. Education provided on medications. Differential diagnosis(s) discussed with patient/patient representative. Home care/self care instructions reviewed withpatient/patient representative. Patient is to follow-up with family care provider in 2-3 days if no improvement. Patient is to go to the emergency department if symptoms worsen. Patient/patient representative isaware of care plan, questions answered, verbalizes understanding and is in agreement. CRITICAL CARE:   None    CONSULTS:  Dr. Miles Live:  None    FINAL IMPRESSION     1. Cellulitis of left lower extremity    2. Hyponatremia    3.  Essential hypertension          DISPOSITION/PLAN   DISPOSITION Admitted 03/04/2022 11:21:09 PM      PATIENT REFERREDTO:  MD WHITLEY Muir [address] [place] [id number]  [phone]    On 3/15/2022  Your Appt is at 2:40 PM, Please bring insurance card, Please arrive 15 minutes before      DISCHARGE MEDICATIONS:  Discharge Medication List as of 3/8/2022 12:38 PM      START taking these medications    Details   sodium chloride 1 g tablet Take 1 tablet by mouth 3 times daily (with meals), Disp-90 tablet, R-0Normal      cephALEXin (KEFLEX) 500 MG capsule Take 1 capsule by mouth 3 times daily for 7 days, Disp-21 capsule, R-0Normal             (Please note that portions of this note were completed with a voice recognition program.  Efforts were made to edit the dictations but occasionally words are mis-transcribed.)         Eduard [place], APRN - [facility] Chapa [place], APRN - [facility]  03/13/22 [id number]

## 2022-03-05 NOTE — PROGRESS NOTES
[facility] Pharmacokinetic Monitoring Service - Vancomycin     Remi Prescott is a 82 y.o. male starting on vancomycin therapy for cellulitis. Pharmacy consulted by Dr. Ewa Hernadez  for monitoring and adjustment. Target Concentration: Goal trough of 10-15 mg/L and AUC/ROSA <500 mg*hr/L    Additional Antimicrobials: zosyn    Pertinent Laboratory Values: Wt Readings from Last 1 Encounters:   03/05/22 231 lb 1.6 oz (104.8 kg)     Temp Readings from Last 1 Encounters:   03/05/22 97.3 °F (36.3 °C) (Oral)     Estimated Creatinine Clearance: 117 mL/min (based on SCr of 0.6 mg/dL). Recent Labs     03/04/22 2015 03/05/22  0550   CREATININE 0.8 0.6   WBC 14.9* 16.3*       Pertinent Cultures:  Culture Date Source Results   03/04/22 Blood cx X2 Gram + bacilli (1/2 cultures)   MRSA Nasal Swab: N/A. Non-respiratory infection.     Plan:  Dosing recommendations based on Bayesian software  Start vancomycin 1,250 mg IV q12h (received vancomycin 1,500 mg x1 dose last night in the ED)  Anticipated AUC of 496 and trough concentration of 15.7 at steady state  Renal labs as indicated   Vancomycin concentration ordered for 03/06/22 @ 1200   Pharmacy will continue to monitor patient and adjust therapy as indicated    Thank you for the consult,  Rema Newton, [address] [place] [address]  3/5/2022 4:14 PM

## 2022-03-05 NOTE — PLAN OF CARE
Problem: Pain:  Goal: Pain level will decrease  Description: Pain level will decrease  Outcome: Ongoing  Note: Patient has denied pain during this shift. PRN pain medications given as ordered. Patient's stated pain goal is no pain. Goal: Control of acute pain  Description: Control of acute pain  Outcome: Ongoing  Note: Patient has denied pain during this shift. PRN pain medications given as ordered. Patient's stated pain goal is no pain. Goal: Control of chronic pain  Description: Control of chronic pain  Outcome: Ongoing  Note: Patient has denied pain during this shift. PRN pain medications given as ordered. Patient's stated pain goal is no pain. Problem: Falls - Risk of:  Goal: Will remain free from falls  Description: Will remain free from falls  Outcome: Ongoing  Note: Patient has remained free of falls during this shift. Appropriate fall prevention measures in place. Patient is compliant with using call light for assistance when needed. Goal: Absence of physical injury  Description: Absence of physical injury  Outcome: Ongoing  Note: Patient has remained free of physical injury during this shift. Safe environment provided, call light within reach, and hourly rounding completed. Problem: Skin Integrity:  Goal: Will show no infection signs and symptoms  Description: Will show no infection signs and symptoms  Outcome: Ongoing  Note: Patient presented with cellulitis of the RLE. Patient is currently on Vancomycin and Zosyn. Will continue to monitor for worsening signs of infection. Goal: Absence of new skin breakdown  Description: Absence of new skin breakdown  Outcome: Ongoing  Note: No new skin issues noted during this shift. Patient is able to reposition with minimal assistance from staff. Problem: Discharge Planning:  Goal: Discharged to appropriate level of care  Description: Discharged to appropriate level of care  Outcome: Ongoing  Note: Discharge planning in progress.  Patient is planning to return to assisted living at discharge. Problem: Neurological  Goal: Maximum potential motor/sensory/cognitive function  Outcome: Ongoing  Note: Patient is alert and oriented x 4. Neuro assessment within normal limits. Problem: Cardiovascular  Goal: No DVT, peripheral vascular complications  Outcome: Ongoing  Note: No s/sx of DVT during this shift. Problem: Respiratory  Goal: No pulmonary complications  Outcome: Ongoing  Note: Lung sounds clear throughout. O2 sats remain greater than 90% on room air. Problem: GI  Goal: No bowel complications  Outcome: Ongoing  Note: Bowel sounds active x 4. Patient is passing gas. Patient reports a large, solid BM during this shift. Problem:   Goal: Adequate urinary output  Outcome: Ongoing  Note: Patient has voided adequate amounts of clear, aysha urine during this shift. Problem: Musculor/Skeletal Functional Status  Goal: Highest potential functional level  Outcome: Ongoing  Note: Patient up with 1-2 assist using a cane to stand pivot to the bedside commode. Care plan reviewed with patient. Patient verbalize understanding of the plan of care and contribute to goal setting.

## 2022-03-05 NOTE — PLAN OF CARE
Problem: Respiratory  Goal: No pulmonary complications  3/6/2536 [id number] by Anne Marie Meier RCP  Outcome: Met This Shift  Note: Duoneb BID to improve breath sounds.

## 2022-03-05 NOTE — RT PROTOCOL NOTE
RT Inhaler-Nebulizer Bronchodilator Protocol Note    There is a bronchodilator order in the chart from a provider indicating to follow the RT Bronchodilator Protocol and there is an Initiate RT Inhaler-Nebulizer Bronchodilator Protocol order as well (see protocol at bottom of note). CXR Findings:  No results found. The findings from the last RT Protocol Assessment were as follows:   History Pulmonary Disease: Chronic pulmonary disease  Respiratory Pattern: Regular pattern and RR 12-20 bpm  Breath Sounds: Slightly diminished and/or crackles  Cough: Strong, productive  Indication for Bronchodilator Therapy: On home bronchodilators  Bronchodilator Assessment Score: 5    Aerosolized bronchodilator medication orders have been revised according to the RT Inhaler-Nebulizer Bronchodilator Protocol below. Respiratory Therapist to perform RT Therapy Protocol Assessment initially then follow the protocol. Repeat RT Therapy Protocol Assessment PRN for score 0-3 or on second treatment, BID, and PRN for scores above 3. No Indications - adjust the frequency to every 6 hours PRN wheezing or bronchospasm, if no treatments needed after 48 hours then discontinue using Per Protocol order mode. If indication present, adjust the RT bronchodilator orders based on the Bronchodilator Assessment Score as indicated below. Use Inhaler orders unless patient has one or more of the following: on home nebulizer, not able to hold breath for 10 seconds, is not alert and oriented, cannot activate and use MDI correctly, or respiratory rate 25 breaths per minute or more, then use the equivalent nebulizer order(s) with same Frequency and PRN reasons based on the score. If a patient is on this medication at home then do not decrease Frequency below that used at home.     0-3 - enter or revise RT bronchodilator order(s) to equivalent RT Bronchodilator order with Frequency of every 4 hours PRN for wheezing or increased work of breathing

## 2022-03-05 NOTE — H&P
Internal Medicine  History and Physical    Patient:  Bandar Prescott  MRN: [id number]      History Obtained From:  patient  PCP: Duyen Fernándze MD    CHIEF COMPLAINT: redness and swelling of the left lower leg       HISTORY OF PRESENT ILLNESS:   The patient is a 82 y.o. male who presents with pain, redness and swelling in the left foot x2 days. Patient reportedly stubbed his left heel on the plastic inside his shoes. The following day he noticed redness involving the left foot. This appears to be rapidly spreading towards the knee. No associated fever or chills. Came to emergency room. He has elevated wbc 16.3K, diagnosed with cellulitis in the left lower extremity. Started on antibiotics. Past Medical History:        Diagnosis Date    Arthritis     Asthma     Atrial fibrillation (HCC)     CHF (congestive heart failure) (HCC)     [facility]) [facility]     Encephalopathy acute 11/20/2013    Hyperlipidemia     Hypertension     Muscular deconditioning 12/23/2013    Pneumonia        Past Surgical History:        Procedure Laterality Date    ABDOMEN SURGERY      ABDOMEN SURGERY N/A 7/11/2021    LEFT ABDOMEN WALL WOUND DEBRIDEMENT AND REPAIR performed by Son Tello MD at [address] [place] [address] EKG 12-LEAD  8/5/2015         FRACTURE SURGERY  unsure   [address] [facility]  [address] [place] [address]    OTHER SURGICAL HISTORY  10-30-13    abdominal exploration, closure of evisceration with retention sutures-Dr. Amrit Bowling OTHER SURGICAL HISTORY  10-25-13    Placement of gastrostomy tube-Dr. Estrellita Morrow        Medications Prior to Admission:    Prior to Admission medications    Medication Sig Start Date End Date Taking?  Authorizing Provider   clobetasol propionate 0.05 % GEL gel Apply 1 applicator topically daily   Yes Historical Provider, MD   FLUoxetine (PROZAC) 10 MG capsule Take 10 mg by mouth daily   Yes Historical Provider, MD   LACTOBACILLUS PO Take 1 tablet by mouth 2 times daily   Yes Historical Provider, MD   Propylene Glycol (SYSTANE COMPLETE OP) Apply 1 drop to eye 4 times daily   Yes Historical Provider, MD   metoprolol tartrate (LOPRESSOR) 25 MG tablet Take 2 tablets by mouth every 12 hours 7/13/21  Yes Lucreo Bernal MD   candesartan (ATACAND) 16 MG tablet Take 16 mg by mouth 2 times daily   Yes Historical Provider, MD   vitamin B-12 (CYANOCOBALAMIN) 100 MCG tablet Take 1,000 mcg by mouth daily   Yes Historical Provider, MD   Cholecalciferol (VITAMIN D) 50 MCG (2000 UT) CAPS capsule Take 2,000 Units by mouth daily   Yes Historical Provider, MD   pantoprazole (PROTONIX) 40 MG tablet Take 40 mg by mouth daily   Yes Historical Provider, MD Trevin Russell 18 MCG inhalation capsule INHALE THE CONTENTS OF ONE CAPSULE BY MOUTH ONCE DAILY AS DIRECTED 12/15/16  Yes TREVA Harris CNP   docusate sodium (COLACE) 100 MG capsule TAKE ONE (1) CAPSULE BY MOUTH TWICE DAILY  Patient taking differently: Take 100 mg by mouth 2 times daily  10/20/16  Yes Anthony Rodriguez MD   LANOXIN 125 MCG tablet Take 1 tablet by mouth daily 8/30/16  Yes TREVA Harris CNP   traZODone (DESYREL) 150 MG tablet TAKE (1) TABLET BY MOUTH NIGHTLY AS NEEDED FOR SLEEP  Patient taking differently: Take 150 mg by mouth nightly Indications: Depression  7/27/16  Yes Anthony Rodriguez MD   fluticasone (FLONASE) 50 MCG/ACT nasal spray INSTILL 1 SPRAY INTRANASALLY DAILY  Patient taking differently: 1 spray daily  7/26/16  Yes Anthony Rodriguez MD   clotrimazole (LOTRIMIN) 1 % cream APPLY TOPICALLY AS DIRECTED  Patient taking differently: Apply 1 applicator topically 2 times daily APPLY TOPICALLY AS DIRECTED 7/20/16  Yes Anthony Rodriguez MD   spironolactone (ALDACTONE) 25 MG tablet TAKE 1 TABLET BY MOUTH ONCE DAILY 4/19/16  Yes Anthony Rodriguez MD   SYMBICORT 160-4.5 MCG/ACT AERO INHALE 2 (TWO) PUFFS BY MOUTH TWICE DAILY 3/1/16  Yes Anupam Martinez MD Sandra   diltiazem (CARDIZEM CD) 120 MG ER capsule Take 1 capsule by mouth daily 1/19/16  Yes Jenni Bal MD   furosemide (LASIX) 20 MG tablet Take 1 tablet by mouth daily 1/19/16  Yes Jenni Bal MD   AMITIZA 24 MCG capsule TAKE ONE (1) CAPSULE BY MOUTH ONCE DAILY WITH FOOD 1/7/16  Yes Jenni Bal MD   Multiple Vitamins-Minerals (CERTAVITE SENIOR/ANTIOXIDANT PO) Take by mouth   Yes Historical Provider, MD   latanoprost (XALATAN) 0.005 % ophthalmic solution 1 drop nightly   Yes Historical Provider, MD   albuterol (PROVENTIL) (2.5 MG/3ML) 0.083% nebulizer solution Take 3 mLs by nebulization every 6 hours as needed for Wheezing  Patient taking differently: Take 2.5 mg by nebulization 2 times daily  7/5/15  Yes Nirmal Dumas MD   Nutritional Supplements (ENSURE COMPLETE) LIQD Take 1 Can by mouth daily    Yes Historical Provider, MD   polyethylene glycol (GLYCOLAX) 17 g packet Take 17 g by mouth daily as needed for Constipation  Patient not taking: Reported on 9/1/2021    Historical Provider, MD   triamcinolone (KENALOG) 0.1 % cream Apply 1 each topically every 12 hours as needed (rash) Apply topically 2 times daily.     Historical Provider, MD   XARELTO 20 MG TABS tablet TAKE ONE (1) TABLET BY MOUTH EACH EVENING WITH MEAL 10/20/16   Jenni Bal MD   albuterol sulfate HFA (PROAIR HFA) 108 (90 BASE) MCG/ACT inhaler INHALE 2 PUFFS BY MOUTH EVERY 4 HOURS AS NEEDED 8/30/16   TREVA Wharton - CNP   Lift Chair MISC by Does not apply route 1/11/16   Jenni Bal MD   polyvinyl alcohol (LIQUIFILM TEARS) 1.4 % ophthalmic solution 1 drop as needed    Historical Provider, MD   magnesium hydroxide (MILK OF MAGNESIA CONCENTRATE) 2400 MG/10ML SUSP Take 2,400 mg by mouth once as needed  Patient not taking: Reported on 9/1/2021    Historical Provider, MD   acetaminophen 650 MG TABS Take 650 mg by mouth every 4 hours as needed 7/5/15   Ramin Amalia Talamantes MD   guaiFENesin (ROBITUSSIN) 100 MG/5ML syrup Take 200 mg by mouth 4 times daily as needed for Cough or Congestion. Patient not taking: Reported on 9/1/2021    Historical Provider, MD       Allergies:  Metolazone and Thiazide-type diuretics    Social History:   TOBACCO:   reports that he has quit smoking. His smoking use included cigarettes. He started smoking about 43 years ago. He has a 60.00 pack-year smoking history. He has never used smokeless tobacco.  ETOH:   reports no history of alcohol use.       Family History:       Problem Relation Age of Onset    Arthritis Father     Asthma Father     Heart Disease Father     High Blood Pressure Father     Cancer Mother         ovarian cancer    Diabetes Mother     Cancer Sister     Heart Disease Sister     Diabetes Brother     Cancer Sister     Heart Disease Sister     Heart Disease Brother     Diabetes Brother     Heart Disease Brother        REVIEW OF SYSTEMS:  CONSTITUTIONAL:  positive for  fatigue and malaise  negative for  fevers and chills  EYES:  negative for  eye discharge and visual disturbance  HEENT:  negative for  epistaxis, snoring and sore mouth  RESPIRATORY:  negative for  dry cough, cough with sputum and dyspnea  CARDIOVASCULAR:  negative for  chest pain, dyspnea, palpitations, fatigue  GASTROINTESTINAL:  negative for nausea, vomiting, change in bowel habits and abdominal distention  GENITOURINARY:  negative for frequency, dysuria and nocturia  INTEGUMENT/BREAST:  positive for skin lesion(s)  negative for rash  HEMATOLOGIC/LYMPHATIC:  positive for petechiae  negative for swelling/edema  ALLERGIC/IMMUNOLOGIC:  negative  ENDOCRINE:  negative  MUSCULOSKELETAL:  positive for  myalgias  negative for  joint swelling and decreased range of motion  NEUROLOGICAL:  negative  BEHAVIOR/PSYCH:  negative    Physical Exam:    Vitals: /60   Pulse 80   Temp 97.3 °F (36.3 °C) (Oral)   Resp 16   Ht 6' 1\" (1.854 m)   Wt 231 lb 1.6 oz (104.8 kg)   SpO2 97%   BMI 30.49 kg/m²   CONSTITUTIONAL:  awake, alert, cooperative, no apparent distress, and appears stated age  EYES:  extra-ocular muscles intact  ENT:  normocepalic, without obvious abnormality  NECK:  supple, symmetrical, trachea midline  HEMATOLOGIC/LYMPHATICS:  no cervical lymphadenopathy and no supraclavicular lymphadenopathy  LUNGS:  no increased work of breathing and clear to auscultation  CARDIOVASCULAR:  irregularly irregular rhythm and normal S1 and S2  ABDOMEN:  normal bowel sounds, soft, distended, non-tender and no hepatosplenomegally  MUSCULOSKELETAL:  Erythema left foot  NEUROLOGIC:  Mental Status Exam:  Level of Alertness:   awake  Orientation:   person, place, time  Memory:   normal  Cranial Nerves:  cranial nerves II-XII are grossly intact  Motor Exam:  Motor exam is symmetrical 5 out of 5 all extremities bilaterally  SKIN:  Erythema left foot            CBC:   Recent Labs     22  0550   WBC 14.9* 16.3*   HGB 12.5* 12.2*    242     BMP:    Recent Labs     22  0550   * 126*   K 4.5 4.2   CL 88* 93*   CO2 27 22*   BUN 13 11   CREATININE 0.8 0.6   GLUCOSE 103 106     Hepatic:   Recent Labs     22   AST 22   ALT 23   BILITOT 0.3   ALKPHOS 65     Pro-BNP 1104.0     Sodium, Ur 75     Osmolality, Ur 260          Ventricular Rate 64 BPM    QRS Duration 94 ms    Q-T Interval 402 ms    QTc Calculation (Bazett) 414 ms    R Axis 52 degrees    T Axis 100 degrees   Narrative:     Atrial fibrillation with occasional ventricular-paced complexes   ST & T wave abnormality, consider anterolateral ischemia         Left lower extremity venous Doppler ultrasound. Comparison: No comparison     Findings:   Left lower extremity venous Doppler examination was performed.  Selected   images from the procedure are provided for interpretation     Left lower extremity:   Venous Doppler demonstrates normal color flow, augmentation and   compression. No intraluminal thrombus is identified. Impression:     1. No acute DVT in the left lower extremity. Assessment and Plan   1. Cellulitis left leg  2. Hyponatremia  3. Chronic a fib  4. Chronic CHFpEF  5. COPD    Cont IV antibiotics  NOAC  Resume home meds  CBC, bmp am.    Patient Active Problem List   Diagnosis Code    COPD (chronic obstructive pulmonary disease) with acute bronchitis ([facility]) J44.0, J20.9    Encephalopathy acute G93.40    Muscular deconditioning R29.898    Constipation, chronic K59.09    Dermatitis L30.9    COPD (chronic obstructive pulmonary disease) ([facility] [address] J44.9    COPD with exacerbation ([facility]) J44.1    Obesity (BMI 30-39. 9) E66.9    COPD with exacerbation ([facility]) J44.1    Acute bronchitis J20.9    Hypertension I10    Atrial fibrillation ([facility]) I48.91    SSS (sick sinus syndrome) ([facility]) I49.5    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[facility]) I49.5    Atrial fibrillation, chronic ([facility]) I48.20    S/P cardiac pacemaker procedure Z95.0    NSTEMI (non-ST elevated myocardial infarction) ([facility]) I21.4    Respiratory failure with hypercapnia ([facility]) J96.92    Pneumonia J18.9    Pulmonary edema cardiac cause ([facility]) I50.1    Respiratory distress R06.03    COPD exacerbation ([facility]) J44.1    CHF (congestive heart failure) ([facility]) C77.7    Systolic CHF, acute on chronic ([facility]) I50.23    Hyperglycemia R73.9    Hyponatremia E87.1    Abdominal wall abscess L02. 211    Cellulitis and abscess of leg L03.119, L02.419       Mirta Elizondo MD, MD  Admitting Internist

## 2022-03-05 NOTE — RT PROTOCOL NOTE
RT Inhaler-Nebulizer Bronchodilator Protocol Note    There is a bronchodilator order in the chart from a provider indicating to follow the RT Bronchodilator Protocol and there is an Initiate RT Inhaler-Nebulizer Bronchodilator Protocol order as well (see protocol at bottom of note). CXR Findings:  No results found. The findings from the last RT Protocol Assessment were as follows:   History Pulmonary Disease: Chronic pulmonary disease  Respiratory Pattern: Regular pattern and RR 12-20 bpm  Breath Sounds: Clear breath sounds  Cough: Strong, spontaneous, non-productive  Indication for Bronchodilator Therapy: On home bronchodilators  Bronchodilator Assessment Score: 2    Aerosolized bronchodilator medication orders have been revised according to the RT Inhaler-Nebulizer Bronchodilator Protocol below. Respiratory Therapist to perform RT Therapy Protocol Assessment initially then follow the protocol. Repeat RT Therapy Protocol Assessment PRN for score 0-3 or on second treatment, BID, and PRN for scores above 3. No Indications - adjust the frequency to every 6 hours PRN wheezing or bronchospasm, if no treatments needed after 48 hours then discontinue using Per Protocol order mode. If indication present, adjust the RT bronchodilator orders based on the Bronchodilator Assessment Score as indicated below. Use Inhaler orders unless patient has one or more of the following: on home nebulizer, not able to hold breath for 10 seconds, is not alert and oriented, cannot activate and use MDI correctly, or respiratory rate 25 breaths per minute or more, then use the equivalent nebulizer order(s) with same Frequency and PRN reasons based on the score. If a patient is on this medication at home then do not decrease Frequency below that used at home.     0-3 - enter or revise RT bronchodilator order(s) to equivalent RT Bronchodilator order with Frequency of every 4 hours PRN for wheezing or increased work of breathing using Per Protocol order mode. 4-6 - enter or revise RT Bronchodilator order(s) to two equivalent RT bronchodilator orders with one order with BID Frequency and one order with Frequency of every 4 hours PRN wheezing or increased work of breathing using Per Protocol order mode. 7-10 - enter or revise RT Bronchodilator order(s) to two equivalent RT bronchodilator orders with one order with TID Frequency and one order with Frequency of every 4 hours PRN wheezing or increased work of breathing using Per Protocol order mode. 11-13 - enter or revise RT Bronchodilator order(s) to one equivalent RT bronchodilator order with QID Frequency and an Albuterol order with Frequency of every 4 hours PRN wheezing or increased work of breathing using Per Protocol order mode. Greater than 13 - enter or revise RT Bronchodilator order(s) to one equivalent RT bronchodilator order with every 4 hours Frequency and an Albuterol order with Frequency of every 2 hours PRN wheezing or increased work of breathing using Per Protocol order mode. RT to enter RT Home Evaluation for COPD & MDI Assessment order using Per Protocol order mode.     Electronically signed by Walter Ha RCP on 3/5/2022 at 5:50 PM

## 2022-03-06 LAB
ANION GAP SERPL CALCULATED.3IONS-SCNC: 14 MEQ/L (ref 8–16)
BUN BLDV-MCNC: 15 MG/DL (ref 7–22)
CALCIUM SERPL-MCNC: 9.2 MG/DL (ref 8.5–10.5)
CHLORIDE BLD-SCNC: 89 MEQ/L (ref 98–111)
CO2: 23 MEQ/L (ref 23–33)
CREAT SERPL-MCNC: 0.8 MG/DL (ref 0.4–1.2)
ERYTHROCYTE [DISTWIDTH] IN BLOOD BY AUTOMATED COUNT: 14.9 % (ref 11.5–14.5)
ERYTHROCYTE [DISTWIDTH] IN BLOOD BY AUTOMATED COUNT: 49.2 FL (ref 35–45)
GFR SERPL CREATININE-BSD FRML MDRD: > 90 ML/MIN/1.73M2
GLUCOSE BLD-MCNC: 110 MG/DL (ref 70–108)
GLUCOSE BLD-MCNC: 120 MG/DL (ref 70–108)
GLUCOSE BLD-MCNC: 128 MG/DL (ref 70–108)
GLUCOSE BLD-MCNC: 134 MG/DL (ref 70–108)
GLUCOSE BLD-MCNC: 98 MG/DL (ref 70–108)
HCT VFR BLD CALC: 36 % (ref 42–52)
HEMOGLOBIN: 12.4 GM/DL (ref 14–18)
MCH RBC QN AUTO: 31.1 PG (ref 26–33)
MCHC RBC AUTO-ENTMCNC: 34.4 GM/DL (ref 32.2–35.5)
MCV RBC AUTO: 90.2 FL (ref 80–94)
PLATELET # BLD: 291 THOU/MM3 (ref 130–400)
PMV BLD AUTO: 10.3 FL (ref 9.4–12.4)
POTASSIUM SERPL-SCNC: 4.1 MEQ/L (ref 3.5–5.2)
RBC # BLD: 3.99 MILL/MM3 (ref 4.7–6.1)
SODIUM BLD-SCNC: 126 MEQ/L (ref 135–145)
VANCOMYCIN RANDOM: 22.8 UG/ML (ref 0.1–39.9)
WBC # BLD: 13.2 THOU/MM3 (ref 4.8–10.8)

## 2022-03-06 PROCEDURE — 1200000000 HC SEMI PRIVATE

## 2022-03-06 PROCEDURE — 85027 COMPLETE CBC AUTOMATED: CPT

## 2022-03-06 PROCEDURE — 6370000000 HC RX 637 (ALT 250 FOR IP): Performed by: INTERNAL MEDICINE

## 2022-03-06 PROCEDURE — 6360000002 HC RX W HCPCS: Performed by: INTERNAL MEDICINE

## 2022-03-06 PROCEDURE — 2580000003 HC RX 258: Performed by: INTERNAL MEDICINE

## 2022-03-06 PROCEDURE — 36415 COLL VENOUS BLD VENIPUNCTURE: CPT

## 2022-03-06 PROCEDURE — 94640 AIRWAY INHALATION TREATMENT: CPT

## 2022-03-06 PROCEDURE — 80202 ASSAY OF VANCOMYCIN: CPT

## 2022-03-06 PROCEDURE — 80048 BASIC METABOLIC PNL TOTAL CA: CPT

## 2022-03-06 PROCEDURE — 82948 REAGENT STRIP/BLOOD GLUCOSE: CPT

## 2022-03-06 RX ORDER — SODIUM CHLORIDE 1000 MG
1 TABLET, SOLUBLE MISCELLANEOUS
Status: DISCONTINUED | OUTPATIENT
Start: 2022-03-06 | End: 2022-03-08 | Stop reason: HOSPADM

## 2022-03-06 RX ORDER — FLUTICASONE PROPIONATE 50 MCG
1 SPRAY, SUSPENSION (ML) NASAL 2 TIMES DAILY
Status: DISCONTINUED | OUTPATIENT
Start: 2022-03-06 | End: 2022-03-08 | Stop reason: HOSPADM

## 2022-03-06 RX ADMIN — FUROSEMIDE 40 MG: 10 INJECTION, SOLUTION INTRAMUSCULAR; INTRAVENOUS at 08:35

## 2022-03-06 RX ADMIN — IPRATROPIUM BROMIDE AND ALBUTEROL SULFATE 1 AMPULE: .5; 3 SOLUTION RESPIRATORY (INHALATION) at 18:10

## 2022-03-06 RX ADMIN — METOPROLOL TARTRATE 25 MG: 50 TABLET, FILM COATED ORAL at 10:43

## 2022-03-06 RX ADMIN — METOPROLOL TARTRATE 25 MG: 50 TABLET, FILM COATED ORAL at 20:50

## 2022-03-06 RX ADMIN — FLUOXETINE HYDROCHLORIDE 10 MG: 10 CAPSULE ORAL at 10:42

## 2022-03-06 RX ADMIN — SODIUM CHLORIDE 1 G: 1 TABLET ORAL at 14:38

## 2022-03-06 RX ADMIN — FLUTICASONE PROPIONATE 1 SPRAY: 50 SPRAY, METERED NASAL at 17:40

## 2022-03-06 RX ADMIN — PIPERACILLIN AND TAZOBACTAM 3375 MG: 3; .375 INJECTION, POWDER, LYOPHILIZED, FOR SOLUTION INTRAVENOUS at 21:03

## 2022-03-06 RX ADMIN — SODIUM CHLORIDE 1 G: 1 TABLET ORAL at 17:41

## 2022-03-06 RX ADMIN — FLUTICASONE PROPIONATE 1 SPRAY: 50 SPRAY, METERED NASAL at 10:42

## 2022-03-06 RX ADMIN — PIPERACILLIN AND TAZOBACTAM 3375 MG: 3; .375 INJECTION, POWDER, LYOPHILIZED, FOR SOLUTION INTRAVENOUS at 04:21

## 2022-03-06 RX ADMIN — IPRATROPIUM BROMIDE AND ALBUTEROL SULFATE 1 AMPULE: .5; 3 SOLUTION RESPIRATORY (INHALATION) at 05:06

## 2022-03-06 RX ADMIN — DILTIAZEM HYDROCHLORIDE 120 MG: 120 CAPSULE, EXTENDED RELEASE ORAL at 10:43

## 2022-03-06 RX ADMIN — LATANOPROST 1 DROP: 50 SOLUTION OPHTHALMIC at 20:50

## 2022-03-06 RX ADMIN — Medication 2000 UNITS: at 10:42

## 2022-03-06 RX ADMIN — PANTOPRAZOLE SODIUM 40 MG: 40 TABLET, DELAYED RELEASE ORAL at 10:46

## 2022-03-06 RX ADMIN — DIGOXIN 125 MCG: 125 TABLET ORAL at 10:42

## 2022-03-06 RX ADMIN — RIVAROXABAN 20 MG: 20 TABLET, FILM COATED ORAL at 17:42

## 2022-03-06 RX ADMIN — VANCOMYCIN HYDROCHLORIDE 1250 MG: 5 INJECTION, POWDER, LYOPHILIZED, FOR SOLUTION INTRAVENOUS at 08:33

## 2022-03-06 RX ADMIN — PIPERACILLIN AND TAZOBACTAM 3375 MG: 3; .375 INJECTION, POWDER, LYOPHILIZED, FOR SOLUTION INTRAVENOUS at 14:31

## 2022-03-06 ASSESSMENT — PAIN SCALES - GENERAL
PAINLEVEL_OUTOF10: 0

## 2022-03-06 NOTE — PROGRESS NOTES
[address] [facility] Pharmacokinetic Monitoring Service - Vancomycin    Consulting Provider: Dr Ewa Hernadez  Indication: SSTI  Target Concentration: Goal trough of 10-15 mg/L and AUC/ROSA <500 mg*hr/L  Day of Therapy: 2  Additional Antimicrobials: zosyn    Pertinent Laboratory Values: Wt Readings from Last 1 Encounters:   03/05/22 231 lb 1.6 oz (104.8 kg)     Temp Readings from Last 1 Encounters:   03/06/22 97.6 °F (36.4 °C) (Oral)     Estimated Creatinine Clearance: 87 mL/min (based on SCr of 0.8 mg/dL). Recent Labs     03/05/22  0550 03/06/22  0440   CREATININE 0.6 0.8   WBC 16.3* 13.2*       Pertinent Cultures:  Culture Date Source Results   03/04/22 Blood cx X2 Gram + bacilli (1/2 cultures)   MRSA Nasal Swab: N/A. Non-respiratory infection.     Recent vancomycin administrations                     vancomycin (VANCOCIN) 1250 mg in dextrose 5 % 250 mL IVPB (mg) 1,250 mg New Bag 03/06/22 0833     1,250 mg New Bag 03/05/22 1840    vancomycin (VANCOCIN) 1,500 mg in dextrose 5 % 500 mL IVPB (mg) 1,500 mg New Bag 03/04/22 2230                    Assessment:  Date/Time Current Dose Concentration Timing of Concentration (h) AUC   3/6/22  1247 1250mg q12h 22.8 4hrs post dose 679   Note: Serum concentrations collected for AUC dosing may appear elevated if collected in close proximity to the dose administered, this is not necessarily an indication of toxicity    Plan:  Current dosing regimen is supra-therapeutic  \"Decrease dose to 1500mg q24h  Consider repeat vancomycin concentration in 2-3 days depending on Cx results and patient response  Pharmacy will continue to monitor patient and adjust therapy as indicated    Thank you for the consult,  Tyrone Valdez, [address] [facility]  3/6/2022 2:11 PM

## 2022-03-06 NOTE — PROGRESS NOTES
INTERNAL MEDICINE Progress Note  3/6/2022 11:59 AM  Subjective:   Admit Date: 3/4/2022  PCP: Laly Bradford MD  Interval History:   Left leg pain and swelling are better  no fever, no chills    Objective:   Vitals: /81   Pulse 74 Comment: 74  Temp 97.6 °F (36.4 °C) (Oral)   Resp 14   Ht 6' 1\" (1.854 m)   Wt 231 lb 1.6 oz (104.8 kg)   SpO2 95%   BMI 30.49 kg/m²   General appearance: alert and cooperative with exam  HEENT: Head: atraumatic  Neck: no adenopathy, no carotid bruit and no JVD  Lungs: clear to auscultation bilaterally  Heart: irregularly irregular rhythm and S1, S2 normal  Abdomen: soft, non-tender; bowel sounds normal; no masses,  no organomegaly  Extremities: erythema left foot / lower leg  Neurologic: Mental status: Alert, oriented, thought content appropriate      Medications:   Scheduled Meds:   fluticasone  1 spray Each Nostril BID    vancomycin (VANCOCIN) intermittent dosing (placeholder)   Other RX Placeholder    ipratropium-albuterol  1 ampule Inhalation BID    lubiprostone  24 mcg Oral Daily with breakfast    Vitamin D  2,000 Units Oral Daily    FLUoxetine  10 mg Oral Daily    digoxin  125 mcg Oral Daily    latanoprost  1 drop Ophthalmic Nightly    pantoprazole  40 mg Oral Daily    vancomycin  1,250 mg IntraVENous Q12H    piperacillin-tazobactam  3,375 mg IntraVENous Q8H    dilTIAZem  120 mg Oral Daily    metoprolol tartrate  25 mg Oral BID    furosemide  40 mg IntraVENous BID    insulin lispro  0-6 Units SubCUTAneous TID     insulin lispro  0-3 Units SubCUTAneous Nightly    rivaroxaban  20 mg Oral Daily     Continuous Infusions:    Lab Results:   CBC:   Recent Labs     03/04/22 2015 03/05/22  0550 03/06/22  0440   WBC 14.9* 16.3* 13.2*   HGB 12.5* 12.2* 12.4*    242 291     BMP:    Recent Labs     03/04/22 2015 03/05/22  0550 03/06/22  0440   * 126* 126*   K 4.5 4.2 4.1   CL 88* 93* 89*   CO2 27 22* 23   BUN 13 11 15   CREATININE 0.8 0.6 0.8 GLUCOSE 103 106 98     Hepatic:   Recent Labs     03/04/22 2015   AST 22   ALT 23   BILITOT 0.3   ALKPHOS 65   Magnesium:    Lab Results   Component Value Date    MG 1.9 11/13/2016     TSH:    Lab Results   Component Value Date    TSH 1.160 03/05/2022 03/05/22 1417    Specimen Source: Blood     Blood Culture, Routine No growth-preliminary  Abnormal     Organism gram positive bacilli Abnormal         Ref. Range 3/5/2022 03:20   Osmolality, Ur Latest Ref Range: 250 - 750 mosmol/kg 260   Sodium, Ur Latest Units: meq/l 75       Assessment and Plan:   1. Cellulitis left leg  2. Hyponatremia, stable, for w/up  3. Chronic a fib  4. Chronic CHFpEF  5.  COPD     Cont IV antibiotics  NOAC  Cont home meds  Restrict free Rossy Hopkins MD, MD

## 2022-03-06 NOTE — PLAN OF CARE
Problem: Pain:  Goal: Pain level will decrease  Description: Pain level will decrease  3/6/2022 0130 by Idalmis Cook RN  Outcome: Ongoing  Note: Patient reports no pain during the shift, pain goal of 3/10. Pt has prn Tylenol for pain. Problem: Falls - Risk of:  Goal: Will remain free from falls  Description: Will remain free from falls  3/6/2022 0130 by Idalmis Cook RN  Outcome: Ongoing  Note: Pt remains free of falls during shift. Bed in lowest position, bed alarm on, call light and table in reach. Pt uses call light appropriately. Problem: Skin Integrity:  Goal: Will show no infection signs and symptoms  Description: Will show no infection signs and symptoms  3/6/2022 0130 by Idalmis Cook RN  Outcome: Ongoing  Note: No signs or symptoms of infection noted during shift, pt remains afebrile. Pt receiving IV zosyn and vancomycin during shift. Problem: Discharge Planning:  Goal: Discharged to appropriate level of care  Description: Discharged to appropriate level of care  3/6/2022 0130 by Idalmis Cook RN  Outcome: Ongoing  Note: Pt plans to return to assisted living at time of discharge when medically clear. Problem: Cardiovascular  Goal: No DVT, peripheral vascular complications  3/9/3574 3124 by Idalmis Cook RN  Outcome: Ongoing  Note: No signs or symptoms of DVT noted. Problem: Respiratory  Goal: No pulmonary complications  4/0/5817 7965 by Idalmis Cook RN  Outcome: Ongoing  Note: No pulmonary complications during shift, pt is one room air, o2 saturations are above 95%. Problem: GI  Goal: No bowel complications  8/9/3159 3378 by Idalmis Cook RN  Outcome: Ongoing  Note: No bowel complications noted at this time, pt reports one large BM on day shift 3/5/22. Bowel sounds active in all 4 quads. Problem:   Goal: Adequate urinary output  3/6/2022 0130 by Idalmis Cook RN  Outcome: Ongoing  Note: Pt having a high urinary output, receiving Lasix BID. Care plan reviewed with patient. Patient verbalizes understanding of the plan of care and contribute to goal setting.

## 2022-03-07 LAB
ANION GAP SERPL CALCULATED.3IONS-SCNC: 11 MEQ/L (ref 8–16)
BUN BLDV-MCNC: 16 MG/DL (ref 7–22)
CALCIUM SERPL-MCNC: 9.2 MG/DL (ref 8.5–10.5)
CHLORIDE BLD-SCNC: 89 MEQ/L (ref 98–111)
CO2: 27 MEQ/L (ref 23–33)
CREAT SERPL-MCNC: 0.7 MG/DL (ref 0.4–1.2)
DIGOXIN LEVEL: 0.7 NG/ML (ref 0.5–2)
ERYTHROCYTE [DISTWIDTH] IN BLOOD BY AUTOMATED COUNT: 14.7 % (ref 11.5–14.5)
ERYTHROCYTE [DISTWIDTH] IN BLOOD BY AUTOMATED COUNT: 48.1 FL (ref 35–45)
GFR SERPL CREATININE-BSD FRML MDRD: > 90 ML/MIN/1.73M2
GLUCOSE BLD-MCNC: 106 MG/DL (ref 70–108)
GLUCOSE BLD-MCNC: 131 MG/DL (ref 70–108)
GLUCOSE BLD-MCNC: 143 MG/DL (ref 70–108)
GLUCOSE BLD-MCNC: 184 MG/DL (ref 70–108)
GLUCOSE BLD-MCNC: 95 MG/DL (ref 70–108)
HCT VFR BLD CALC: 36.3 % (ref 42–52)
HEMOGLOBIN: 12.5 GM/DL (ref 14–18)
MCH RBC QN AUTO: 30.7 PG (ref 26–33)
MCHC RBC AUTO-ENTMCNC: 34.4 GM/DL (ref 32.2–35.5)
MCV RBC AUTO: 89.2 FL (ref 80–94)
PLATELET # BLD: 290 THOU/MM3 (ref 130–400)
PMV BLD AUTO: 10 FL (ref 9.4–12.4)
POTASSIUM SERPL-SCNC: 4.3 MEQ/L (ref 3.5–5.2)
RBC # BLD: 4.07 MILL/MM3 (ref 4.7–6.1)
SODIUM BLD-SCNC: 127 MEQ/L (ref 135–145)
WBC # BLD: 14.2 THOU/MM3 (ref 4.8–10.8)

## 2022-03-07 PROCEDURE — 2580000003 HC RX 258: Performed by: INTERNAL MEDICINE

## 2022-03-07 PROCEDURE — 36415 COLL VENOUS BLD VENIPUNCTURE: CPT

## 2022-03-07 PROCEDURE — 6370000000 HC RX 637 (ALT 250 FOR IP): Performed by: INTERNAL MEDICINE

## 2022-03-07 PROCEDURE — 80048 BASIC METABOLIC PNL TOTAL CA: CPT

## 2022-03-07 PROCEDURE — 82948 REAGENT STRIP/BLOOD GLUCOSE: CPT

## 2022-03-07 PROCEDURE — 2580000003 HC RX 258: Performed by: PHARMACIST

## 2022-03-07 PROCEDURE — 6360000002 HC RX W HCPCS: Performed by: PHARMACIST

## 2022-03-07 PROCEDURE — 85027 COMPLETE CBC AUTOMATED: CPT

## 2022-03-07 PROCEDURE — 80162 ASSAY OF DIGOXIN TOTAL: CPT

## 2022-03-07 PROCEDURE — 94760 N-INVAS EAR/PLS OXIMETRY 1: CPT

## 2022-03-07 PROCEDURE — 6360000002 HC RX W HCPCS: Performed by: INTERNAL MEDICINE

## 2022-03-07 PROCEDURE — 1200000000 HC SEMI PRIVATE

## 2022-03-07 PROCEDURE — 94640 AIRWAY INHALATION TREATMENT: CPT

## 2022-03-07 RX ADMIN — SODIUM CHLORIDE 1 G: 1 TABLET ORAL at 13:27

## 2022-03-07 RX ADMIN — PANTOPRAZOLE SODIUM 40 MG: 40 TABLET, DELAYED RELEASE ORAL at 10:17

## 2022-03-07 RX ADMIN — IPRATROPIUM BROMIDE AND ALBUTEROL SULFATE 1 AMPULE: .5; 3 SOLUTION RESPIRATORY (INHALATION) at 07:45

## 2022-03-07 RX ADMIN — LATANOPROST 1 DROP: 50 SOLUTION OPHTHALMIC at 20:56

## 2022-03-07 RX ADMIN — ACETAMINOPHEN 650 MG: 325 TABLET ORAL at 04:21

## 2022-03-07 RX ADMIN — SODIUM CHLORIDE 1 G: 1 TABLET ORAL at 10:18

## 2022-03-07 RX ADMIN — FLUTICASONE PROPIONATE 1 SPRAY: 50 SPRAY, METERED NASAL at 10:17

## 2022-03-07 RX ADMIN — Medication 2000 UNITS: at 10:18

## 2022-03-07 RX ADMIN — IPRATROPIUM BROMIDE AND ALBUTEROL SULFATE 1 AMPULE: .5; 3 SOLUTION RESPIRATORY (INHALATION) at 16:11

## 2022-03-07 RX ADMIN — ACETAMINOPHEN 650 MG: 325 TABLET ORAL at 14:16

## 2022-03-07 RX ADMIN — PIPERACILLIN AND TAZOBACTAM 3375 MG: 3; .375 INJECTION, POWDER, LYOPHILIZED, FOR SOLUTION INTRAVENOUS at 13:25

## 2022-03-07 RX ADMIN — FLUTICASONE PROPIONATE 1 SPRAY: 50 SPRAY, METERED NASAL at 16:45

## 2022-03-07 RX ADMIN — DIGOXIN 125 MCG: 125 TABLET ORAL at 10:18

## 2022-03-07 RX ADMIN — PIPERACILLIN AND TAZOBACTAM 3375 MG: 3; .375 INJECTION, POWDER, LYOPHILIZED, FOR SOLUTION INTRAVENOUS at 04:24

## 2022-03-07 RX ADMIN — PIPERACILLIN AND TAZOBACTAM 3375 MG: 3; .375 INJECTION, POWDER, LYOPHILIZED, FOR SOLUTION INTRAVENOUS at 20:59

## 2022-03-07 RX ADMIN — DILTIAZEM HYDROCHLORIDE 120 MG: 120 CAPSULE, EXTENDED RELEASE ORAL at 10:18

## 2022-03-07 RX ADMIN — VANCOMYCIN HYDROCHLORIDE 1500 MG: 5 INJECTION, POWDER, LYOPHILIZED, FOR SOLUTION INTRAVENOUS at 10:20

## 2022-03-07 RX ADMIN — FLUOXETINE HYDROCHLORIDE 10 MG: 10 CAPSULE ORAL at 10:18

## 2022-03-07 RX ADMIN — METOPROLOL TARTRATE 25 MG: 50 TABLET, FILM COATED ORAL at 10:17

## 2022-03-07 RX ADMIN — METOPROLOL TARTRATE 25 MG: 50 TABLET, FILM COATED ORAL at 20:57

## 2022-03-07 RX ADMIN — RIVAROXABAN 20 MG: 20 TABLET, FILM COATED ORAL at 16:45

## 2022-03-07 RX ADMIN — SODIUM CHLORIDE 1 G: 1 TABLET ORAL at 16:45

## 2022-03-07 ASSESSMENT — PAIN SCALES - GENERAL
PAINLEVEL_OUTOF10: 3

## 2022-03-07 ASSESSMENT — PAIN DESCRIPTION - FREQUENCY: FREQUENCY: CONTINUOUS

## 2022-03-07 ASSESSMENT — PAIN - FUNCTIONAL ASSESSMENT: PAIN_FUNCTIONAL_ASSESSMENT: PREVENTS OR INTERFERES SOME ACTIVE ACTIVITIES AND ADLS

## 2022-03-07 ASSESSMENT — PAIN DESCRIPTION - LOCATION: LOCATION: NECK

## 2022-03-07 ASSESSMENT — PAIN DESCRIPTION - ONSET: ONSET: ON-GOING

## 2022-03-07 ASSESSMENT — PAIN DESCRIPTION - PROGRESSION: CLINICAL_PROGRESSION: NOT CHANGED

## 2022-03-07 ASSESSMENT — PAIN DESCRIPTION - PAIN TYPE: TYPE: ACUTE PAIN

## 2022-03-07 ASSESSMENT — PAIN DESCRIPTION - DESCRIPTORS: DESCRIPTORS: ACHING

## 2022-03-07 NOTE — PROGRESS NOTES
Comprehensive Nutrition Assessment    Type and Reason for Visit:  Initial,Positive Nutrition Screen (wound)    Nutrition Recommendations/Plan:   Will change diet to choppped meats, bite size pieces for texture modification per pt. Request.  Encouraged po, good nutrition at best efforts. Recommend MVI. Pt. Desires to remain on calorie controlled diet to assist with healthy weight. Nutrition Assessment:    Pt. nutritionally compromised AEB wounds. At risk for further nutrition compromise r/t increased nutrient needs for wound healing, underlying medical condition (hxCHF, COPD, encephalopathy, HLD, HTN, muscular deconditioning). Malnutrition Assessment:  Malnutrition Status:  No malnutrition    Context:  Acute Illness     Findings of the 6 clinical characteristics of malnutrition:  Energy Intake:  No significant decrease in energy intake  Weight Loss:  No significant weight loss     Body Fat Loss:  No significant body fat loss     Muscle Mass Loss:  No significant muscle mass loss    Fluid Accumulation:  Unable to assess     Strength:  Not Performed    Estimated Daily Nutrient Needs:  Energy (kcal):  7595-6939 kcals (15-18); Weight Used for Energy Requirements:   (105 kgm)     Protein (g):  101+ grams (1.2+); Weight Used for Protein Requirements:  Ideal (84 kgm)            Nutrition Related Findings:       Pt. Report/Treatments/Miscellaneous: pt. Seen - reports appetite and intake is \"pretty good\"; states some trouble with meats, hard vegetables - requesting texture modification; consuming % of meals per available graphics; states leg is getting better  GI Status: 1 BM noted past 48 hours  Pertinent Labs: 3/7: Glucose 95, BUN 16, Cr 0.7, Sodium 127, Potassium 4.3  Pertinent Meds: ATB, Insulin, Zofran, Vitamin D      Wounds:   (cellulitis, abscess-leg)       Current Nutrition Therapies:    ADULT DIET;  Regular; 4 carb choices (60 gm/meal); 1000 ml    Anthropometric Measures:  · Height: 6' 1\" (185.4 cm)  · Current Body Weight: 231 lb 1.6 oz (104.8 kg) (3/5 +2 edema)   · Admission Body Weight: 231 lb 1.6 oz (104.8 kg) (3/5 +2 edema)    · Usual Body Weight:  (per EMR: 233# 7.5 oz)     · Ideal Body Weight: 184 lbs;      · BMI: 30.5  · BMI Categories: Obese Class 1 (BMI 30.0-34. 9)       Nutrition Diagnosis:   · Increased nutrient needs related to increase demand for energy/nutrients as evidenced by wounds (cellulitis)      Nutrition Interventions:   Food and/or Nutrient Delivery:  Modify Current Diet  Nutrition Education/Counseling:  Education initiated (3/7 Encouraged po, good nutrition at best efforts as tolerated.)   Coordination of Nutrition Care:  Continue to monitor while inpatient    Goals:  Pt. will consume 75% or more of meals during LOS. Nutrition Monitoring and Evaluation:   Behavioral-Environmental Outcomes:  None Identified   Food/Nutrient Intake Outcomes:  Diet Advancement/Tolerance,Food and Nutrient Intake  Physical Signs/Symptoms Outcomes:  Biochemical Data,Chewing or Swallowing,GI Status,Fluid Status or Edema,Nutrition Focused Physical Findings,Skin,Weight     Discharge Planning:     Too soon to determine     Electronically signed by Costa Puga RD, LD on 3/7/22 at 4:01 PM EST    Contact: [phone]

## 2022-03-07 NOTE — CARE COORDINATION
3/7/22, 2:14 PM EST  DISCHARGE PLANNING EVALUATION:    Paulina Prescott       Admitted: 3/4/2022/ [facility] day: 3   Location: -27/027-A Reason for admit: Hyponatremia [E87.1]  Cellulitis and abscess of leg [L03.119, L02.419]  Cellulitis of left lower extremity [Z90.759]   PMH:  has a past medical history of Arthritis, Asthma, Atrial fibrillation ([facility] [address] CHF (congestive heart failure) ([facility] [address] COPD (chronic obstructive pulmonary disease) ([facility] [address] Encephalopathy acute, Hyperlipidemia, Hypertension, Muscular deconditioning, and Pneumonia. Procedure: none  Barriers to Discharge:  Admitted for cellulitis and abscess of leg. + blood cultures. IV zosyn. IV vanc. PCP: Charu Shankar MD  Readmission Risk Score: 15.4 ( )%    Patient Goals/Plan/Treatment Preferences: Akua Yeh is from [address] also following. Transportation/Food Security/Housekeeping Addressed:  No issues identified.

## 2022-03-07 NOTE — PLAN OF CARE
Problem: Pain:  Goal: Pain level will decrease  Description: Pain level will decrease    Outcome: Ongoing  Note: Patient reports no pain during the shift, pain goal of 3/10. Pt has prn Tylenol for pain. Problem: Falls - Risk of:  Goal: Will remain free from falls  Description: Will remain free from falls    Outcome: Ongoing  Note: Pt remains free of falls during shift. Bed in lowest position, bed alarm on, call light and table in reach. Pt uses call light appropriately. Problem: Skin Integrity:  Goal: Will show no infection signs and symptoms  Description: Will show no infection signs and symptoms    Outcome: Ongoing  Note: No signs or symptoms of infection noted during shift, pt remains afebrile. Pt receiving IV zosyn and vancomycin during shift. Problem: Discharge Planning:  Goal: Discharged to appropriate level of care  Description: Discharged to appropriate level of care    Outcome: Ongoing  Note: Pt plans to return to assisted living at time of discharge when medically clear. Problem: Cardiovascular  Goal: No DVT, peripheral vascular complications  7/9/3361 9172 by Jared Meléndez RN  Outcome: Ongoing  Note: No signs or symptoms of DVT noted. Problem: Respiratory  Goal: No pulmonary complications    Outcome: Ongoing  Note: No pulmonary complications during shift, pt is one room air, o2 saturations are above 95%. Problem: GI  Goal: No bowel complications    Outcome: Ongoing  Note: No bowel complications noted at this time, pt reports one large BM on day shift 3/5/22. Bowel sounds active in all 4 quads. Problem:   Goal: Adequate urinary output    Outcome: Ongoing  Note: Pt having a high urinary output, using urinal at bedside. Pt is receiving Lasix BID. Care plan reviewed with patient. Patient verbalizes understanding of the plan of care and contribute to goal setting.

## 2022-03-07 NOTE — CARE COORDINATION
3/7/22, 4:13 PM EST    DISCHARGE PLANNING EVALUATION      Spoke with [address] [place] [address],Third Floor Living. Confirmed patient is a resident there, will likely return to that level of care at discharge.

## 2022-03-07 NOTE — PLAN OF CARE
Problem: Nutrition  Goal: Optimal nutrition therapy  Outcome: Ongoing  Nutrition Problem #1: Increased nutrient needs  Intervention: Food and/or Nutrient Delivery: Modify Current Diet  Nutritional Goals: Pt. will consume 75% or more of meals during LOS.

## 2022-03-08 VITALS
SYSTOLIC BLOOD PRESSURE: 132 MMHG | OXYGEN SATURATION: 97 % | DIASTOLIC BLOOD PRESSURE: 80 MMHG | WEIGHT: 231.1 LBS | HEART RATE: 80 BPM | BODY MASS INDEX: 30.63 KG/M2 | TEMPERATURE: 98.2 F | RESPIRATION RATE: 16 BRPM | HEIGHT: 73 IN

## 2022-03-08 LAB
ANION GAP SERPL CALCULATED.3IONS-SCNC: 11 MEQ/L (ref 8–16)
BUN BLDV-MCNC: 13 MG/DL (ref 7–22)
C-REACTIVE PROTEIN: 3.52 MG/DL (ref 0–1)
CALCIUM SERPL-MCNC: 9.4 MG/DL (ref 8.5–10.5)
CHLORIDE BLD-SCNC: 91 MEQ/L (ref 98–111)
CO2: 26 MEQ/L (ref 23–33)
CREAT SERPL-MCNC: 0.7 MG/DL (ref 0.4–1.2)
ERYTHROCYTE [DISTWIDTH] IN BLOOD BY AUTOMATED COUNT: 14.5 % (ref 11.5–14.5)
ERYTHROCYTE [DISTWIDTH] IN BLOOD BY AUTOMATED COUNT: 47.4 FL (ref 35–45)
GFR SERPL CREATININE-BSD FRML MDRD: > 90 ML/MIN/1.73M2
GLUCOSE BLD-MCNC: 112 MG/DL (ref 70–108)
GLUCOSE BLD-MCNC: 88 MG/DL (ref 70–108)
GLUCOSE BLD-MCNC: 90 MG/DL (ref 70–108)
HCT VFR BLD CALC: 34.8 % (ref 42–52)
HEMOGLOBIN: 12 GM/DL (ref 14–18)
MCH RBC QN AUTO: 30.5 PG (ref 26–33)
MCHC RBC AUTO-ENTMCNC: 34.5 GM/DL (ref 32.2–35.5)
MCV RBC AUTO: 88.5 FL (ref 80–94)
PLATELET # BLD: 306 THOU/MM3 (ref 130–400)
PMV BLD AUTO: 9.7 FL (ref 9.4–12.4)
POTASSIUM SERPL-SCNC: 4.6 MEQ/L (ref 3.5–5.2)
RBC # BLD: 3.93 MILL/MM3 (ref 4.7–6.1)
SEDIMENTATION RATE, ERYTHROCYTE: 70 MM/HR (ref 0–10)
SODIUM BLD-SCNC: 128 MEQ/L (ref 135–145)
WBC # BLD: 12.8 THOU/MM3 (ref 4.8–10.8)

## 2022-03-08 PROCEDURE — 86140 C-REACTIVE PROTEIN: CPT

## 2022-03-08 PROCEDURE — 94640 AIRWAY INHALATION TREATMENT: CPT

## 2022-03-08 PROCEDURE — 6370000000 HC RX 637 (ALT 250 FOR IP): Performed by: INTERNAL MEDICINE

## 2022-03-08 PROCEDURE — 82948 REAGENT STRIP/BLOOD GLUCOSE: CPT

## 2022-03-08 PROCEDURE — 36415 COLL VENOUS BLD VENIPUNCTURE: CPT

## 2022-03-08 PROCEDURE — 6360000002 HC RX W HCPCS: Performed by: INTERNAL MEDICINE

## 2022-03-08 PROCEDURE — 94760 N-INVAS EAR/PLS OXIMETRY 1: CPT

## 2022-03-08 PROCEDURE — 80048 BASIC METABOLIC PNL TOTAL CA: CPT

## 2022-03-08 PROCEDURE — 2580000003 HC RX 258: Performed by: INTERNAL MEDICINE

## 2022-03-08 PROCEDURE — 85027 COMPLETE CBC AUTOMATED: CPT

## 2022-03-08 PROCEDURE — 85651 RBC SED RATE NONAUTOMATED: CPT

## 2022-03-08 RX ORDER — CEPHALEXIN 500 MG/1
500 CAPSULE ORAL 3 TIMES DAILY
Qty: 21 CAPSULE | Refills: 0 | Status: SHIPPED | OUTPATIENT
Start: 2022-03-08 | End: 2022-03-15

## 2022-03-08 RX ORDER — SODIUM CHLORIDE 1000 MG
1 TABLET, SOLUBLE MISCELLANEOUS
Qty: 90 TABLET | Refills: 0 | Status: SHIPPED | OUTPATIENT
Start: 2022-03-08

## 2022-03-08 RX ADMIN — PIPERACILLIN AND TAZOBACTAM 3375 MG: 3; .375 INJECTION, POWDER, LYOPHILIZED, FOR SOLUTION INTRAVENOUS at 04:55

## 2022-03-08 RX ADMIN — PIPERACILLIN AND TAZOBACTAM 3375 MG: 3; .375 INJECTION, POWDER, LYOPHILIZED, FOR SOLUTION INTRAVENOUS at 12:13

## 2022-03-08 RX ADMIN — DIGOXIN 125 MCG: 125 TABLET ORAL at 10:11

## 2022-03-08 RX ADMIN — SODIUM CHLORIDE 1 G: 1 TABLET ORAL at 07:55

## 2022-03-08 RX ADMIN — SODIUM CHLORIDE 1 G: 1 TABLET ORAL at 12:12

## 2022-03-08 RX ADMIN — Medication 2000 UNITS: at 10:22

## 2022-03-08 RX ADMIN — DIPHENHYDRAMINE HYDROCHLORIDE 25 MG: 25 TABLET ORAL at 10:23

## 2022-03-08 RX ADMIN — FLUTICASONE PROPIONATE 1 SPRAY: 50 SPRAY, METERED NASAL at 10:11

## 2022-03-08 RX ADMIN — ACETAMINOPHEN 650 MG: 325 TABLET ORAL at 07:58

## 2022-03-08 RX ADMIN — METOPROLOL TARTRATE 25 MG: 50 TABLET, FILM COATED ORAL at 10:11

## 2022-03-08 RX ADMIN — IPRATROPIUM BROMIDE AND ALBUTEROL SULFATE 1 AMPULE: .5; 3 SOLUTION RESPIRATORY (INHALATION) at 09:55

## 2022-03-08 RX ADMIN — PANTOPRAZOLE SODIUM 40 MG: 40 TABLET, DELAYED RELEASE ORAL at 10:10

## 2022-03-08 RX ADMIN — DILTIAZEM HYDROCHLORIDE 120 MG: 120 CAPSULE, EXTENDED RELEASE ORAL at 10:11

## 2022-03-08 ASSESSMENT — PAIN SCALES - GENERAL: PAINLEVEL_OUTOF10: 6

## 2022-03-08 NOTE — PROGRESS NOTES
INTERNAL MEDICINE Progress Note  3/8/2022 10:04 AM  Subjective:   Admit Date: 3/4/2022  PCP: Rachelle Rodney MD  Interval History:     left leg pain and swelling significantly better  no fever, no chills    Objective:   Vitals: /76   Pulse 72   Temp 97.9 °F (36.6 °C) (Oral)   Resp 16   Ht 6' 1\" (1.854 m)   Wt 231 lb 1.6 oz (104.8 kg)   SpO2 96%   BMI 30.49 kg/m²   General appearance: alert and cooperative with exam  HEENT:  atraumatic  Neck:  no JVD  Lungs: clear to auscultation bilaterally  Heart: irregularly irregular rhythm and S1, S2 normal  Abdomen: soft, non-tender; bowel sounds normal; no masses,  no organomegaly  Extremities: no edema, no erythema  Neurologic:  Alert, oriented, thought content appropriate      Medications:   Scheduled Meds:   fluticasone  1 spray Each Nostril BID    sodium chloride  1 g Oral TID WC    ipratropium-albuterol  1 ampule Inhalation BID    lubiprostone  24 mcg Oral Daily with breakfast    Vitamin D  2,000 Units Oral Daily    digoxin  125 mcg Oral Daily    latanoprost  1 drop Ophthalmic Nightly    pantoprazole  40 mg Oral Daily    piperacillin-tazobactam  3,375 mg IntraVENous Q8H    dilTIAZem  120 mg Oral Daily    metoprolol tartrate  25 mg Oral BID    insulin lispro  0-6 Units SubCUTAneous TID WC    insulin lispro  0-3 Units SubCUTAneous Nightly    rivaroxaban  20 mg Oral Daily     Continuous Infusions:    Lab Results:   CBC:   Recent Labs     03/06/22  0440 03/07/22  0623 03/08/22  0447   WBC 13.2* 14.2* 12.8*   HGB 12.4* 12.5* 12.0*    290 306     BMP:    Recent Labs     03/06/22  0440 03/07/22  0623 03/08/22  0447   * 127* 128*   K 4.1 4.3 4.6   CL 89* 89* 91*   CO2 23 27 26   BUN 15 16 13   CREATININE 0.8 0.7 0.7   GLUCOSE 98 95 90     TSH:    Lab Results   Component Value Date    TSH 1.160 03/05/2022 03/05/22 1417    Specimen Source: Blood     Blood Culture, Routine No growth-preliminary  Abnormal     Organism gram positive bacilli Abnormal         Ref. Range 3/5/2022 03:20   Osmolality, Ur Latest Ref Range: 250 - 750 mosmol/kg 260   Sodium, Ur Latest Units: meq/l 75       Assessment and Plan:   1. Cellulitis left leg  2. Hyponatremia, chronic,   - better, stable   - related to fluoxetine, dced fluoxetine  3. Chronic a fib, CVR  4. Chronic CHFpEF  5. COPD     switch to po antibiotic keflex  NOAC  Cont home meds  Stable for dc home today.     Mirta Elizondo MD, MD

## 2022-03-08 NOTE — FLOWSHEET NOTE
Swati Gomez is an 82year old male who is in bed on 7k. He shared about his physical problem with the infection is his foot. His nurse stated he is going home today. This  is familiar with Swati Gomez as his family attended [facility] in the past.  We caught up on each other's family news. A prayer for his healing was had  He is going home today. 03/08/22 1136   Encounter Summary   Services provided to: Patient   Referral/Consult From: [facility]   Support System Spouse; Children   Continue Visiting No  (3/8 going home)   Complexity of Encounter Moderate   Length of Encounter 15 minutes   Routine   Type Follow up   Spiritual/Oriental orthodox   Type Spiritual support

## 2022-03-08 NOTE — CARE COORDINATION
DISCHARGE/PLANNING EVALUATION  3/8/22, 2:27 PM EST    Reason for Referral:  Return to assisted living   Mental Status:  Alert, answers questions   Decision Making:  Son makes decisions regarding care   Family/Social/Home Environment:  Patient is a resident of assisted living at [facility]. Plan is to return there. He has assistance with all care at the facility. He has family support, including his son. Current Services including food security, transportation and housekeeping:  Assisted living   Current Equipment:   Payment Source: [place] medicare   Concerns or Barriers to Discharge: return to assisted living   Post acute provider list with quality measures, geographic area and applicable managed care information provided. Questions regarding selection process answered: declined     Teach Back Method used with son regarding care plan and discharge plan  Patient's son verbalizes understanding of the plan of care and contributes to goal setting. Patient goals, treatment preferences and discharge plan:  Spoke with son about discharge. He is in agreement with return to [facility] assisted living. Confirmed plan with [facility] staff. AVS faxed. 3/8/22, 2:30 PM EST    Patient goals/plan/ treatment preferences discussed by  and . Patient goals/plan/ treatment preferences reviewed with patient/ family. Patient/ family verbalize understanding of discharge plan and are in agreement with goal/plan/treatment preferences. Understanding was demonstrated using the teach back method. AVS provided by RN at time of discharge, which includes all necessary medical information pertaining to the patients current course of illness, treatment, post-discharge goals of care, and treatment preferences.     Services After Discharge  Services At/After Discharge: Aide Services,Nursing Services ([address] [place] [address])   IMM Letter  IMM Letter given to Patient/Family/Significant other/Guardian/POA/by[de-identified] CM: Jenn  IMM Letter date given[de-identified] 03/08/22  IMM Letter time given[de-identified] 9651          Electronically signed by JESICA Villasenor on 3/8/2022 at 2:30 PM

## 2022-03-08 NOTE — PLAN OF CARE
Problem: Pain:  Goal: Pain level will decrease  Description: Pain level will decrease  Outcome: Ongoing  Note: Pt report pain at 0-6 on scale. Pt states oral medication helping to achieve pain goal of a 0 on scale. Problem: Pain:  Goal: Control of acute pain  Description: Control of acute pain  Outcome: Ongoing  Note: Pt report pain at 0-6 on scale. Pt states oral medication helping to achieve pain goal of a 0 on scale. Problem: Pain:  Goal: Control of chronic pain  Description: Control of chronic pain  Outcome: Ongoing  Note: Pt report pain at 0-6 on scale. Pt states oral medication helping to achieve pain goal of a 0 on scale. Problem: Falls - Risk of:  Goal: Will remain free from falls  Description: Will remain free from falls  Outcome: Ongoing  Note: Pt using call light appropriately to call for assistance. Pt reports understanding of fall prevention when discussed. Problem: Falls - Risk of:  Goal: Absence of physical injury  Description: Absence of physical injury  Outcome: Ongoing  Note: Pt using call light appropriately to call for assistance. Pt reports understanding of fall prevention when discussed. Problem: Skin Integrity:  Goal: Will show no infection signs and symptoms  Description: Will show no infection signs and symptoms  Outcome: Ongoing  Note: No new skin impairments noted. Pt understands the importance of frequent repositioning in order to prevent any skin breakdown. Problem: Skin Integrity:  Goal: Absence of new skin breakdown  Description: Absence of new skin breakdown  Outcome: Ongoing  Note: No new skin impairments noted. Pt understands the importance of frequent repositioning in order to prevent any skin breakdown. Problem: Discharge Planning:  Goal: Discharged to appropriate level of care  Description: Discharged to appropriate level of care  Outcome: Ongoing  Note: Pt plans AL at discharge. Care manager and social working helping with discharge needs. Problem: Neurological  Goal: Maximum potential motor/sensory/cognitive function  Outcome: Ongoing  Note: Denies numbness and tingling. Skin pink warm and dry. Pulses palpable. Neuro checks continue B2fozyo. Problem: Cardiovascular  Goal: No DVT, peripheral vascular complications  Outcome: Ongoing  Note: Pt without s/s of DVT. Pt able to take prescribed anticoagulants to help prevent development of DVT. Problem: Respiratory  Goal: No pulmonary complications  Outcome: Ongoing  Note: Pt sats >90% on room air. No shortness of breath noted. Lungs clear, uses IS, and able to C&DB  as ordered. Problem: GI  Goal: No bowel complications  Outcome: Ongoing  Note: Pt with bowel sounds, passing flatus, and without nausea. Taking prescribed medications to assist with BM      Problem:   Goal: Adequate urinary output  Outcome: Ongoing  Note: Pt voiding adequate amounts without difficulty. Problem: Musculor/Skeletal Functional Status  Goal: Highest potential functional level  Outcome: Ongoing  Note: Up with 1 assist, walker, and gait belt. Requires assistance to mobilize out of bed, chair and to [facility]. Independent once set up for meals and bathing. Problem: Impaired respiratory status  Goal: Clear lung sounds  Description: Clear lung sounds  3/7/2022 0757 by Pat Jarquin RCP  Outcome: Ongoing     Problem: Nutrition  Goal: Optimal nutrition therapy  3/7/2022 2003 by Denise Walsh RN  Outcome: Ongoing  Note: Eating independently at meals and able to eat adequate amounts. Care plan reviewed with patient. Patient verbalize understanding of the plan of care and contribute to goal setting.

## 2022-03-08 NOTE — DISCHARGE SUMMARY
Discharge Summary    Misty Prescott  :  1935  MRN:  [id number]    Admit date:  3/4/2022  Discharge date:      Admitting Physician:  Nirmal Dumas MD    Discharge Diagnoses:    1. Cellulitis left leg  2. Hyponatremia, chronic,              - better, stable              - related to fluoxetine, dced fluoxetine  3. Chronic a fib, CVR  4. Chronic CHFpEF  5. COPD, stable    Admission Condition:  serious  Discharged Condition:  good    Hospital Course:   Patient was treated with antibiotics for severe cellulitis in the left foot/ lower leg. He responded to antibiotics. 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 Discontinued Fluoxetine, will monitor on trazodone. If persists, will wean off trazodone. Home today. Discharge Medications:         Medication List      START taking these medications    cephALEXin 500 MG capsule  Commonly known as: KEFLEX  Take 1 capsule by mouth 3 times daily for 7 days     sodium chloride 1 g tablet  Take 1 tablet by mouth 3 times daily (with meals)        CHANGE how you take these medications    * albuterol (2.5 MG/3ML) 0.083% nebulizer solution  Commonly known as: PROVENTIL  Take 3 mLs by nebulization every 6 hours as needed for Wheezing  What changed: when to take this     * albuterol sulfate  (90 Base) MCG/ACT inhaler  Commonly known as: ProAir HFA  INHALE 2 PUFFS BY MOUTH EVERY 4 HOURS AS NEEDED  What changed: Another medication with the same name was changed. Make sure you understand how and when to take each. clotrimazole 1 % cream  Commonly known as: LOTRIMIN  APPLY TOPICALLY AS DIRECTED  What changed:   · how much to take  · how to take this  · when to take this     docusate sodium 100 MG capsule  Commonly known as: COLACE  TAKE ONE (1) CAPSULE BY MOUTH TWICE DAILY  What changed: See the new instructions.      fluticasone 50 MCG/ACT nasal spray  Commonly known as: FLONASE  INSTILL 1 SPRAY INTRANASALLY DAILY  What changed: See the new instructions. traZODone 150 MG tablet  Commonly known as: DESYREL  TAKE (1) TABLET BY MOUTH NIGHTLY AS NEEDED FOR SLEEP  What changed: See the new instructions. * This list has 2 medication(s) that are the same as other medications prescribed for you. Read the directions carefully, and ask your doctor or other care provider to review them with you.             CONTINUE taking these medications    Acetaminophen 650 MG Tabs     Amitiza 24 MCG capsule  Generic drug: lubiprostone  TAKE ONE (1) CAPSULE BY MOUTH ONCE DAILY WITH FOOD     candesartan 16 MG tablet  Commonly known as: ATACAND     CERTAVITE SENIOR/ANTIOXIDANT PO     clobetasol propionate 0.05 % Gel gel     dilTIAZem 120 MG extended release capsule  Commonly known as: CARDIZEM CD  Take 1 capsule by mouth daily     Ensure Complete Liqd     guaiFENesin 100 MG/5ML syrup  Commonly known as: ROBITUSSIN     LACTOBACILLUS PO     Lanoxin 125 MCG tablet  Generic drug: digoxin  Take 1 tablet by mouth daily     latanoprost 0.005 % ophthalmic solution  Commonly known as: XALATAN     Lift Chair Misc  by Does not apply route     magnesium hydroxide 2400 MG/10ML Susp  Commonly known as: MILK OF MAGNESIA CONCENTRATE     metoprolol tartrate 25 MG tablet  Commonly known as: LOPRESSOR  Take 2 tablets by mouth every 12 hours     pantoprazole 40 MG tablet  Commonly known as: PROTONIX     polyvinyl alcohol 1.4 % ophthalmic solution  Commonly known as: LIQUIFILM TEARS     Spiriva HandiHaler 18 MCG inhalation capsule  Generic drug: tiotropium  INHALE THE CONTENTS OF ONE CAPSULE BY MOUTH ONCE DAILY AS DIRECTED     spironolactone 25 MG tablet  Commonly known as: ALDACTONE  TAKE 1 TABLET BY MOUTH ONCE DAILY     Symbicort 160-4.5 MCG/ACT Aero  Generic drug: budesonide-formoterol  INHALE 2 (TWO) PUFFS BY MOUTH TWICE DAILY     SYSTANE COMPLETE OP     triamcinolone 0.1 % cream  Commonly known as: KENALOG     vitamin B-12 100 MCG tablet  Commonly known as: CYANOCOBALAMIN vitamin D 50 MCG (2000 UT) Caps capsule     Xarelto 20 MG Tabs tablet  Generic drug: rivaroxaban  TAKE ONE (1) TABLET BY MOUTH EACH EVENING WITH MEAL        STOP taking these medications    FLUoxetine 10 MG capsule  Commonly known as: PROZAC     furosemide 20 MG tablet  Commonly known as: Lasix     polyethylene glycol 17 g packet  Commonly known as: GLYCOLAX           Where to Get Your Medications      These medications were sent to [facility] [address], [address]  [address], [facility] [place] [address]    Phone: [phone]   · cephALEXin 500 MG capsule  · sodium chloride 1 g tablet         Consults:  none    Significant Diagnostic Studies: labs:CBC:         Recent Labs     03/06/22  0440 03/07/22  0623 03/08/22  0447   WBC 13.2* 14.2* 12.8*   HGB 12.4* 12.5* 12.0*    290 306      BMP:          Recent Labs     03/06/22  0440 03/07/22  0623 03/08/22  0447   * 127* 128*   K 4.1 4.3 4.6   CL 89* 89* 91*   CO2 23 27 26   BUN 15 16 13   CREATININE 0.8 0.7 0.7   GLUCOSE 98 95 90      TSH:          Lab Results   Component Value Date     TSH 1.160 03/05/2022 03/05/22 1417     Specimen Source: Blood       Blood Culture, Routine No growth-preliminary  Abnormal      Organism gram positive bacilli Abnormal            Ref. Range 3/5/2022 03:20   Osmolality, Ur Latest Ref Range: 250 - 750 mosmol/kg 260   Sodium, Ur Latest Units: meq/l 75         Assessment and Plan:   3. Cellulitis left leg  4. Hyponatremia, chronic,              - better, stable              - related to fluoxetine, dced fluoxetine  6. Chronic a fib, CVR  7. Chronic CHFpEF  8. COPD     switch to po antibiotic keflex  NOAC  Cont home meds  Stable for dc home today. Treatments:   IV antibiotics, NOAC, bronchodilators    Disposition: home (assisted living facility).     Signed:  Stephanie Khan MD  3/8/2022, 10:17 AM

## 2022-03-08 NOTE — FLOWSHEET NOTE
[place] [address] PROGRESS NOTE      Patient: Alysa Crandall Music  Room #: 7C-20/243-M            YOB: 1935  Age: 82 y.o. Gender: male            Admit Date & Time: 3/4/2022  6:20 PM    Assessment:  Pt was laying in bed. Pt shared that he lives at the [facility]. Pt's wife of 58 years also lives there. Pt shared a life review that included sharing about his time in the [facility]. Pt is very proud of his children. Pt is Mennonite and knows chaplain Tesfaye Alexandre. Interventions:   provided a listening and supportive presence.  explored pt's beliefs and support network. Outcomes:  Pt expressed gratitude for the encounter. Plan:  1. Provide spiritual care and support. 2.  Have chaplain Tesfaye Alexandre follow up with pt. Electronically signed by Micah Jasmine on 3/7/2022 at 10:34 PM.  3 [facility]  [phone]       03/07/22 2209   Encounter Summary   Services provided to: Patient   Referral/Consult From: [facility]   Support System Spouse; Children   Place of Catholic   ([facility] )   Continue Visiting Yes  (3/7)   Complexity of Encounter Moderate   Length of Encounter 30 minutes   Spiritual Assessment Completed Yes   Spiritual/Anglican   Type Spiritual support   Assessment Calm; Approachable   Intervention Active listening;Explored feelings, thoughts, concerns;Explored coping resources;Sustaining presence/ Ministry of presence   Outcome Shared life review;Engaged in conversation;Comfort; Connection/belonging

## 2022-03-09 ENCOUNTER — CARE COORDINATION (OUTPATIENT)
Dept: CASE MANAGEMENT | Age: 87
End: 2022-03-09

## 2022-03-09 DIAGNOSIS — L03.119 CELLULITIS AND ABSCESS OF LEG: Primary | ICD-10-CM

## 2022-03-09 DIAGNOSIS — L02.419 CELLULITIS AND ABSCESS OF LEG: Primary | ICD-10-CM

## 2022-03-09 LAB
BLOOD CULTURE, ROUTINE: ABNORMAL
BLOOD CULTURE, ROUTINE: ABNORMAL
ORGANISM: ABNORMAL
ORGANISM: ABNORMAL

## 2022-03-09 PROCEDURE — 1111F DSCHRG MED/CURRENT MED MERGE: CPT | Performed by: INTERNAL MEDICINE

## 2022-03-09 NOTE — CARE COORDINATION
[facility] Transitions Initial Follow Up Call    Call within 2 business days of discharge: Yes    Patient: Kt Prescott Patient : 1935   MRN: <[id number]>  Reason for Admission: LLE Cellulitis   Discharge Date: 3/8/22 RARS: Readmission Risk Score: 15.1 ( )  Facility: [address] [place] [address]       Complaint Diagnosis Description Type Department Provider    3/4/22 Leg Swelling Cellulitis of left lower extremity . .. ED to Hosp-Admission (Discharged) (ADMITTED) GABRIELE Mercado MD        Non-face-to-face services provided:  Obtained and reviewed discharge summary and/or continuity of care documents    Care Transitions 24 Hour Call    Schedule Follow Up Appointment with PCP: Kailash Aldrich you have any ongoing symptoms?: No  Do you have a copy of your discharge instructions?: Yes  Do you have all of your prescriptions and are they filled?: Yes  Have you been contacted by a [facility] Pharmacist?: No  Have you scheduled your follow up appointment?: Yes  How are you going to get to your appointment?: Other (Comment: per AL)  Were you discharged with any Home Care or Post Acute Services: Yes  Post Acute Services: Home Health (Comment: [address])  Do you feel like you have everything you need to keep you well at home?: Yes  Care Transitions Interventions  No Identified Needs       Spoke shortly w/ Shahla Vargas, Nurse at [address] [facility]. Reports Patient doing well since return to facility. Dressing fell off, nurse needing to apply new--area improved. No Patient complaints, vss. Patient taking Keflex, Sod Chloride as directed. 1111F completed. Facility aware of and has set up transportation for 3/15/22 2:40pm PCP appt. AL staff manage all Patient healthcare needs including dispensing of meds, appt scheduling/transportation, meals, care etc. Shahla Vragas denies any concerns or needs. Episode closed per protocol as all healthcare needs per facility. No further outreach scheduled.    iPositioning, RN

## 2022-03-10 LAB — BLOOD CULTURE, ROUTINE: NORMAL

## 2022-03-13 ASSESSMENT — ENCOUNTER SYMPTOMS
COLOR CHANGE: 1
RHINORRHEA: 0
COUGH: 0
VOMITING: 0
ABDOMINAL PAIN: 0
EYE REDNESS: 0
NAUSEA: 0
CHEST TIGHTNESS: 0
BACK PAIN: 0

## 2022-05-30 ENCOUNTER — APPOINTMENT (OUTPATIENT)
Dept: CT IMAGING | Age: 87
DRG: 644 | End: 2022-05-30
Payer: MEDICARE

## 2022-05-30 ENCOUNTER — APPOINTMENT (OUTPATIENT)
Dept: GENERAL RADIOLOGY | Age: 87
DRG: 644 | End: 2022-05-30
Payer: MEDICARE

## 2022-05-30 ENCOUNTER — HOSPITAL ENCOUNTER (INPATIENT)
Age: 87
LOS: 1 days | Discharge: NURSING, REHAB OR LONG-TERM CARE | DRG: 644 | End: 2022-06-03
Attending: EMERGENCY MEDICINE | Admitting: INTERNAL MEDICINE
Payer: MEDICARE

## 2022-05-30 DIAGNOSIS — R77.8 ELEVATED TROPONIN: ICD-10-CM
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
## 2022-05-30 DIAGNOSIS — E87.1 HYPONATREMIA: ICD-10-CM

## 2022-05-30 DIAGNOSIS — E22.2 SIADH (SYNDROME OF INAPPROPRIATE ADH PRODUCTION) (HCC): Primary | ICD-10-CM

## 2022-05-30 LAB
ALBUMIN SERPL-MCNC: 3.8 G/DL (ref 3.5–5.1)
ALP BLD-CCNC: 63 U/L (ref 38–126)
ALT SERPL-CCNC: 18 U/L (ref 11–66)
ANION GAP SERPL CALCULATED.3IONS-SCNC: 10 MEQ/L (ref 8–16)
AST SERPL-CCNC: 26 U/L (ref 5–40)
BASOPHILS # BLD: 0.6 %
BASOPHILS ABSOLUTE: 0.1 THOU/MM3 (ref 0–0.1)
BILIRUB SERPL-MCNC: 0.3 MG/DL (ref 0.3–1.2)
BUN BLDV-MCNC: 11 MG/DL (ref 7–22)
CALCIUM SERPL-MCNC: 9 MG/DL (ref 8.5–10.5)
CHLORIDE BLD-SCNC: 89 MEQ/L (ref 98–111)
CO2: 26 MEQ/L (ref 23–33)
CREAT SERPL-MCNC: 0.8 MG/DL (ref 0.4–1.2)
EOSINOPHIL # BLD: 2.1 %
EOSINOPHILS ABSOLUTE: 0.2 THOU/MM3 (ref 0–0.4)
ERYTHROCYTE [DISTWIDTH] IN BLOOD BY AUTOMATED COUNT: 13.5 % (ref 11.5–14.5)
ERYTHROCYTE [DISTWIDTH] IN BLOOD BY AUTOMATED COUNT: 45.4 FL (ref 35–45)
GFR SERPL CREATININE-BSD FRML MDRD: > 90 ML/MIN/1.73M2
GLUCOSE BLD-MCNC: 120 MG/DL (ref 70–108)
HCT VFR BLD CALC: 34.9 % (ref 42–52)
HEMOGLOBIN: 11.8 GM/DL (ref 14–18)
IMMATURE GRANS (ABS): 0.08 THOU/MM3 (ref 0–0.07)
IMMATURE GRANULOCYTES: 0.7 %
LYMPHOCYTES # BLD: 10.5 %
LYMPHOCYTES ABSOLUTE: 1.1 THOU/MM3 (ref 1–4.8)
MCH RBC QN AUTO: 30.8 PG (ref 26–33)
MCHC RBC AUTO-ENTMCNC: 33.8 GM/DL (ref 32.2–35.5)
MCV RBC AUTO: 91.1 FL (ref 80–94)
MONOCYTES # BLD: 9.3 %
MONOCYTES ABSOLUTE: 1 THOU/MM3 (ref 0.4–1.3)
NUCLEATED RED BLOOD CELLS: 0 /100 WBC
OSMOLALITY CALCULATION: 252.1 MOSMOL/KG (ref 275–300)
PLATELET # BLD: 240 THOU/MM3 (ref 130–400)
PMV BLD AUTO: 9.9 FL (ref 9.4–12.4)
POTASSIUM REFLEX MAGNESIUM: 4.7 MEQ/L (ref 3.5–5.2)
RBC # BLD: 3.83 MILL/MM3 (ref 4.7–6.1)
SEG NEUTROPHILS: 76.8 %
SEGMENTED NEUTROPHILS ABSOLUTE COUNT: 8.3 THOU/MM3 (ref 1.8–7.7)
SODIUM BLD-SCNC: 125 MEQ/L (ref 135–145)
TOTAL PROTEIN: 6.3 G/DL (ref 6.1–8)
TROPONIN T: 0.06 NG/ML
WBC # BLD: 10.8 THOU/MM3 (ref 4.8–10.8)

## 2022-05-30 PROCEDURE — 85025 COMPLETE CBC W/AUTO DIFF WBC: CPT

## 2022-05-30 PROCEDURE — 72125 CT NECK SPINE W/O DYE: CPT

## 2022-05-30 PROCEDURE — 84484 ASSAY OF TROPONIN QUANT: CPT

## 2022-05-30 PROCEDURE — 6370000000 HC RX 637 (ALT 250 FOR IP): Performed by: EMERGENCY MEDICINE

## 2022-05-30 PROCEDURE — 93005 ELECTROCARDIOGRAM TRACING: CPT | Performed by: EMERGENCY MEDICINE

## 2022-05-30 PROCEDURE — 70450 CT HEAD/BRAIN W/O DYE: CPT

## 2022-05-30 PROCEDURE — 73502 X-RAY EXAM HIP UNI 2-3 VIEWS: CPT

## 2022-05-30 PROCEDURE — 71045 X-RAY EXAM CHEST 1 VIEW: CPT

## 2022-05-30 PROCEDURE — 6360000002 HC RX W HCPCS: Performed by: EMERGENCY MEDICINE

## 2022-05-30 PROCEDURE — 73110 X-RAY EXAM OF WRIST: CPT

## 2022-05-30 PROCEDURE — 99285 EMERGENCY DEPT VISIT HI MDM: CPT

## 2022-05-30 PROCEDURE — 90715 TDAP VACCINE 7 YRS/> IM: CPT | Performed by: EMERGENCY MEDICINE

## 2022-05-30 PROCEDURE — 90471 IMMUNIZATION ADMIN: CPT | Performed by: EMERGENCY MEDICINE

## 2022-05-30 PROCEDURE — 80053 COMPREHEN METABOLIC PANEL: CPT

## 2022-05-30 RX ORDER — ACETAMINOPHEN 500 MG
1000 TABLET ORAL ONCE
Status: COMPLETED | OUTPATIENT
Start: 2022-05-30 | End: 2022-05-30

## 2022-05-30 RX ADMIN — ACETAMINOPHEN 1000 MG: 500 TABLET ORAL at 22:56

## 2022-05-30 RX ADMIN — TETANUS TOXOID, REDUCED DIPHTHERIA TOXOID AND ACELLULAR PERTUSSIS VACCINE, ADSORBED 0.5 ML: 5; 2.5; 8; 8; 2.5 SUSPENSION INTRAMUSCULAR at 22:57

## 2022-05-30 ASSESSMENT — PAIN - FUNCTIONAL ASSESSMENT: PAIN_FUNCTIONAL_ASSESSMENT: NONE - DENIES PAIN

## 2022-05-30 ASSESSMENT — ENCOUNTER SYMPTOMS
ABDOMINAL PAIN: 0
COUGH: 0
SHORTNESS OF BREATH: 0
BACK PAIN: 0
VOMITING: 0

## 2022-05-31 LAB
ANION GAP SERPL CALCULATED.3IONS-SCNC: 10 MEQ/L (ref 8–16)
BUN BLDV-MCNC: 8 MG/DL (ref 7–22)
CALCIUM SERPL-MCNC: 9.1 MG/DL (ref 8.5–10.5)
CHLORIDE BLD-SCNC: 95 MEQ/L (ref 98–111)
CO2: 26 MEQ/L (ref 23–33)
CREAT SERPL-MCNC: 0.7 MG/DL (ref 0.4–1.2)
DIGOXIN LEVEL: 0.8 NG/ML (ref 0.5–2)
EKG ATRIAL RATE: 72 BPM
EKG Q-T INTERVAL: 414 MS
EKG QRS DURATION: 158 MS
EKG QTC CALCULATION (BAZETT): 447 MS
EKG R AXIS: 73 DEGREES
EKG T AXIS: -169 DEGREES
EKG VENTRICULAR RATE: 70 BPM
GFR SERPL CREATININE-BSD FRML MDRD: > 90 ML/MIN/1.73M2
GLUCOSE BLD-MCNC: 110 MG/DL (ref 70–108)
LV EF: 50 %
LVEF MODALITY: NORMAL
OSMOLALITY URINE: 345 MOSMOL/KG (ref 250–750)
OSMOLALITY: 277 MOSMOL/KG (ref 275–295)
POTASSIUM SERPL-SCNC: 4.2 MEQ/L (ref 3.5–5.2)
SODIUM BLD-SCNC: 129 MEQ/L (ref 135–145)
SODIUM BLD-SCNC: 130 MEQ/L (ref 135–145)
SODIUM BLD-SCNC: 131 MEQ/L (ref 135–145)
SODIUM URINE: 111 MEQ/L
TROPONIN T: 0.04 NG/ML
TROPONIN T: 0.05 NG/ML

## 2022-05-31 PROCEDURE — 96360 HYDRATION IV INFUSION INIT: CPT

## 2022-05-31 PROCEDURE — 84295 ASSAY OF SERUM SODIUM: CPT

## 2022-05-31 PROCEDURE — 6360000002 HC RX W HCPCS: Performed by: INTERNAL MEDICINE

## 2022-05-31 PROCEDURE — 94760 N-INVAS EAR/PLS OXIMETRY 1: CPT

## 2022-05-31 PROCEDURE — G0378 HOSPITAL OBSERVATION PER HR: HCPCS

## 2022-05-31 PROCEDURE — 6370000000 HC RX 637 (ALT 250 FOR IP): Performed by: INTERNAL MEDICINE

## 2022-05-31 PROCEDURE — 94640 AIRWAY INHALATION TREATMENT: CPT

## 2022-05-31 PROCEDURE — 84300 ASSAY OF URINE SODIUM: CPT

## 2022-05-31 PROCEDURE — 2580000003 HC RX 258: Performed by: INTERNAL MEDICINE

## 2022-05-31 PROCEDURE — 80162 ASSAY OF DIGOXIN TOTAL: CPT

## 2022-05-31 PROCEDURE — 96372 THER/PROPH/DIAG INJ SC/IM: CPT

## 2022-05-31 PROCEDURE — 93010 ELECTROCARDIOGRAM REPORT: CPT | Performed by: NUCLEAR MEDICINE

## 2022-05-31 PROCEDURE — 83935 ASSAY OF URINE OSMOLALITY: CPT

## 2022-05-31 PROCEDURE — 36415 COLL VENOUS BLD VENIPUNCTURE: CPT

## 2022-05-31 PROCEDURE — 84484 ASSAY OF TROPONIN QUANT: CPT

## 2022-05-31 PROCEDURE — 80048 BASIC METABOLIC PNL TOTAL CA: CPT

## 2022-05-31 PROCEDURE — 83930 ASSAY OF BLOOD OSMOLALITY: CPT

## 2022-05-31 PROCEDURE — 93306 TTE W/DOPPLER COMPLETE: CPT

## 2022-05-31 PROCEDURE — 96361 HYDRATE IV INFUSION ADD-ON: CPT

## 2022-05-31 PROCEDURE — 99215 OFFICE O/P EST HI 40 MIN: CPT | Performed by: NUCLEAR MEDICINE

## 2022-05-31 RX ORDER — ACETAMINOPHEN 325 MG/1
650 TABLET ORAL EVERY 4 HOURS PRN
Status: DISCONTINUED | OUTPATIENT
Start: 2022-05-31 | End: 2022-05-31 | Stop reason: ALTCHOICE

## 2022-05-31 RX ORDER — ISOSORBIDE MONONITRATE 30 MG/1
30 TABLET, EXTENDED RELEASE ORAL DAILY
Status: DISCONTINUED | OUTPATIENT
Start: 2022-05-31 | End: 2022-06-03 | Stop reason: HOSPADM

## 2022-05-31 RX ORDER — ONDANSETRON 4 MG/1
4 TABLET, ORALLY DISINTEGRATING ORAL EVERY 8 HOURS PRN
Status: DISCONTINUED | OUTPATIENT
Start: 2022-05-31 | End: 2022-06-03 | Stop reason: HOSPADM

## 2022-05-31 RX ORDER — ACETAMINOPHEN 325 MG/1
650 TABLET ORAL EVERY 6 HOURS PRN
Status: DISCONTINUED | OUTPATIENT
Start: 2022-05-31 | End: 2022-06-03 | Stop reason: HOSPADM

## 2022-05-31 RX ORDER — LATANOPROST 50 UG/ML
1 SOLUTION/ DROPS OPHTHALMIC NIGHTLY
Status: DISCONTINUED | OUTPATIENT
Start: 2022-05-31 | End: 2022-06-03 | Stop reason: HOSPADM

## 2022-05-31 RX ORDER — DIGOXIN 125 MCG
125 TABLET ORAL DAILY
Status: DISCONTINUED | OUTPATIENT
Start: 2022-05-31 | End: 2022-06-03 | Stop reason: HOSPADM

## 2022-05-31 RX ORDER — POLYVINYL ALCOHOL 14 MG/ML
1 SOLUTION/ DROPS OPHTHALMIC 4 TIMES DAILY
Status: DISCONTINUED | OUTPATIENT
Start: 2022-05-31 | End: 2022-06-03 | Stop reason: HOSPADM

## 2022-05-31 RX ORDER — ALBUTEROL SULFATE 2.5 MG/3ML
2.5 SOLUTION RESPIRATORY (INHALATION) EVERY 4 HOURS PRN
Status: DISCONTINUED | OUTPATIENT
Start: 2022-05-31 | End: 2022-06-03 | Stop reason: HOSPADM

## 2022-05-31 RX ORDER — DOCUSATE SODIUM 100 MG/1
100 CAPSULE, LIQUID FILLED ORAL DAILY
Status: DISCONTINUED | OUTPATIENT
Start: 2022-05-31 | End: 2022-06-03 | Stop reason: HOSPADM

## 2022-05-31 RX ORDER — ALBUTEROL SULFATE 2.5 MG/3ML
2.5 SOLUTION RESPIRATORY (INHALATION) 4 TIMES DAILY
Status: DISCONTINUED | OUTPATIENT
Start: 2022-05-31 | End: 2022-05-31

## 2022-05-31 RX ORDER — VALSARTAN 80 MG/1
80 TABLET ORAL EVERY 12 HOURS SCHEDULED
Status: DISCONTINUED | OUTPATIENT
Start: 2022-05-31 | End: 2022-06-03 | Stop reason: HOSPADM

## 2022-05-31 RX ORDER — SPIRONOLACTONE 25 MG/1
25 TABLET ORAL DAILY
Status: DISCONTINUED | OUTPATIENT
Start: 2022-05-31 | End: 2022-06-03 | Stop reason: HOSPADM

## 2022-05-31 RX ORDER — SODIUM CHLORIDE 9 MG/ML
1000 INJECTION, SOLUTION INTRAVENOUS CONTINUOUS
Status: DISCONTINUED | OUTPATIENT
Start: 2022-05-31 | End: 2022-05-31

## 2022-05-31 RX ORDER — CLOBETASOL PROPIONATE 0.5 MG/G
1 CREAM TOPICAL DAILY
Status: DISCONTINUED | OUTPATIENT
Start: 2022-05-31 | End: 2022-06-03 | Stop reason: HOSPADM

## 2022-05-31 RX ORDER — SODIUM CHLORIDE 0.9 % (FLUSH) 0.9 %
5-40 SYRINGE (ML) INJECTION EVERY 12 HOURS SCHEDULED
Status: DISCONTINUED | OUTPATIENT
Start: 2022-05-31 | End: 2022-06-03 | Stop reason: HOSPADM

## 2022-05-31 RX ORDER — SODIUM CHLORIDE 9 MG/ML
INJECTION, SOLUTION INTRAVENOUS PRN
Status: DISCONTINUED | OUTPATIENT
Start: 2022-05-31 | End: 2022-06-03 | Stop reason: HOSPADM

## 2022-05-31 RX ORDER — VITAMIN B COMPLEX
2000 TABLET ORAL DAILY
Status: DISCONTINUED | OUTPATIENT
Start: 2022-05-31 | End: 2022-06-03 | Stop reason: HOSPADM

## 2022-05-31 RX ORDER — CLOTRIMAZOLE 1 %
1 CREAM (GRAM) TOPICAL 2 TIMES DAILY
Status: DISCONTINUED | OUTPATIENT
Start: 2022-05-31 | End: 2022-06-03 | Stop reason: HOSPADM

## 2022-05-31 RX ORDER — ALBUTEROL SULFATE 2.5 MG/3ML
2.5 SOLUTION RESPIRATORY (INHALATION) 2 TIMES DAILY
Status: DISCONTINUED | OUTPATIENT
Start: 2022-05-31 | End: 2022-06-03 | Stop reason: HOSPADM

## 2022-05-31 RX ORDER — ENOXAPARIN SODIUM 100 MG/ML
30 INJECTION SUBCUTANEOUS 2 TIMES DAILY
Status: DISCONTINUED | OUTPATIENT
Start: 2022-05-31 | End: 2022-05-31

## 2022-05-31 RX ORDER — LANOLIN ALCOHOL/MO/W.PET/CERES
1000 CREAM (GRAM) TOPICAL DAILY
Status: DISCONTINUED | OUTPATIENT
Start: 2022-05-31 | End: 2022-06-03 | Stop reason: HOSPADM

## 2022-05-31 RX ORDER — METOPROLOL TARTRATE 50 MG/1
50 TABLET, FILM COATED ORAL EVERY 12 HOURS SCHEDULED
Status: DISCONTINUED | OUTPATIENT
Start: 2022-05-31 | End: 2022-06-03 | Stop reason: HOSPADM

## 2022-05-31 RX ORDER — SODIUM CHLORIDE 1000 MG
1 TABLET, SOLUBLE MISCELLANEOUS
Status: DISCONTINUED | OUTPATIENT
Start: 2022-05-31 | End: 2022-06-03 | Stop reason: HOSPADM

## 2022-05-31 RX ORDER — ACETAMINOPHEN 650 MG/1
650 SUPPOSITORY RECTAL EVERY 6 HOURS PRN
Status: DISCONTINUED | OUTPATIENT
Start: 2022-05-31 | End: 2022-06-03 | Stop reason: HOSPADM

## 2022-05-31 RX ORDER — DILTIAZEM HYDROCHLORIDE 120 MG/1
120 CAPSULE, COATED, EXTENDED RELEASE ORAL DAILY
Status: DISCONTINUED | OUTPATIENT
Start: 2022-05-31 | End: 2022-06-03 | Stop reason: HOSPADM

## 2022-05-31 RX ORDER — ONDANSETRON 2 MG/ML
4 INJECTION INTRAMUSCULAR; INTRAVENOUS EVERY 6 HOURS PRN
Status: DISCONTINUED | OUTPATIENT
Start: 2022-05-31 | End: 2022-06-03 | Stop reason: HOSPADM

## 2022-05-31 RX ORDER — SODIUM CHLORIDE 9 MG/ML
INJECTION, SOLUTION INTRAVENOUS CONTINUOUS
Status: DISCONTINUED | OUTPATIENT
Start: 2022-05-31 | End: 2022-05-31

## 2022-05-31 RX ORDER — ATORVASTATIN CALCIUM 40 MG/1
40 TABLET, FILM COATED ORAL NIGHTLY
Status: DISCONTINUED | OUTPATIENT
Start: 2022-05-31 | End: 2022-06-03 | Stop reason: HOSPADM

## 2022-05-31 RX ORDER — SODIUM CHLORIDE 0.9 % (FLUSH) 0.9 %
5-40 SYRINGE (ML) INJECTION PRN
Status: DISCONTINUED | OUTPATIENT
Start: 2022-05-31 | End: 2022-06-03 | Stop reason: HOSPADM

## 2022-05-31 RX ORDER — PANTOPRAZOLE SODIUM 40 MG/1
40 TABLET, DELAYED RELEASE ORAL
Status: DISCONTINUED | OUTPATIENT
Start: 2022-05-31 | End: 2022-06-03 | Stop reason: HOSPADM

## 2022-05-31 RX ORDER — POLYETHYLENE GLYCOL 3350 17 G/17G
17 POWDER, FOR SOLUTION ORAL DAILY PRN
Status: DISCONTINUED | OUTPATIENT
Start: 2022-05-31 | End: 2022-06-03 | Stop reason: HOSPADM

## 2022-05-31 RX ADMIN — ATORVASTATIN CALCIUM 40 MG: 40 TABLET, FILM COATED ORAL at 20:42

## 2022-05-31 RX ADMIN — SODIUM CHLORIDE, PRESERVATIVE FREE 10 ML: 5 INJECTION INTRAVENOUS at 20:56

## 2022-05-31 RX ADMIN — METOPROLOL TARTRATE 50 MG: 50 TABLET, FILM COATED ORAL at 20:42

## 2022-05-31 RX ADMIN — DILTIAZEM HYDROCHLORIDE 120 MG: 120 CAPSULE, COATED, EXTENDED RELEASE ORAL at 08:56

## 2022-05-31 RX ADMIN — Medication 1000 MCG: at 12:53

## 2022-05-31 RX ADMIN — RIVAROXABAN 20 MG: 20 TABLET, FILM COATED ORAL at 17:24

## 2022-05-31 RX ADMIN — VALSARTAN 80 MG: 80 TABLET, FILM COATED ORAL at 20:41

## 2022-05-31 RX ADMIN — Medication 1 APPLICATOR: at 20:43

## 2022-05-31 RX ADMIN — METOPROLOL TARTRATE 50 MG: 50 TABLET, FILM COATED ORAL at 12:52

## 2022-05-31 RX ADMIN — POLYVINYL ALCOHOL 1 DROP: 14 SOLUTION/ DROPS OPHTHALMIC at 20:42

## 2022-05-31 RX ADMIN — SODIUM CHLORIDE 1 G: 1 TABLET ORAL at 12:52

## 2022-05-31 RX ADMIN — Medication 1 APPLICATOR: at 17:22

## 2022-05-31 RX ADMIN — ALBUTEROL SULFATE 2.5 MG: 2.5 SOLUTION RESPIRATORY (INHALATION) at 12:14

## 2022-05-31 RX ADMIN — ALBUTEROL SULFATE 2.5 MG: 2.5 SOLUTION RESPIRATORY (INHALATION) at 17:18

## 2022-05-31 RX ADMIN — VALSARTAN 80 MG: 80 TABLET, FILM COATED ORAL at 12:53

## 2022-05-31 RX ADMIN — LATANOPROST 1 DROP: 50 SOLUTION OPHTHALMIC at 20:42

## 2022-05-31 RX ADMIN — DOCUSATE SODIUM 100 MG: 100 CAPSULE, LIQUID FILLED ORAL at 08:57

## 2022-05-31 RX ADMIN — ISOSORBIDE MONONITRATE 30 MG: 30 TABLET, EXTENDED RELEASE ORAL at 12:53

## 2022-05-31 RX ADMIN — ACETAMINOPHEN 650 MG: 325 TABLET ORAL at 12:51

## 2022-05-31 RX ADMIN — SODIUM CHLORIDE: 9 INJECTION, SOLUTION INTRAVENOUS at 03:52

## 2022-05-31 RX ADMIN — PANTOPRAZOLE SODIUM 40 MG: 40 TABLET, DELAYED RELEASE ORAL at 08:57

## 2022-05-31 RX ADMIN — CLOBETASOL PROPIONATE 1 APPLICATOR: 0.5 CREAM TOPICAL at 17:18

## 2022-05-31 RX ADMIN — DIGOXIN 125 MCG: 125 TABLET ORAL at 08:55

## 2022-05-31 RX ADMIN — Medication 2000 UNITS: at 12:53

## 2022-05-31 RX ADMIN — ENOXAPARIN SODIUM 30 MG: 100 INJECTION SUBCUTANEOUS at 08:57

## 2022-05-31 RX ADMIN — MOMETASONE FUROATE AND FORMOTEROL FUMARATE DIHYDRATE 2 PUFF: 200; 5 AEROSOL RESPIRATORY (INHALATION) at 17:18

## 2022-05-31 RX ADMIN — SODIUM CHLORIDE 1 G: 1 TABLET ORAL at 17:24

## 2022-05-31 RX ADMIN — SPIRONOLACTONE 25 MG: 25 TABLET ORAL at 08:56

## 2022-05-31 RX ADMIN — POLYVINYL ALCOHOL 1 DROP: 14 SOLUTION/ DROPS OPHTHALMIC at 17:20

## 2022-05-31 ASSESSMENT — PAIN SCALES - GENERAL
PAINLEVEL_OUTOF10: 0
PAINLEVEL_OUTOF10: 4
PAINLEVEL_OUTOF10: 0

## 2022-05-31 ASSESSMENT — PAIN DESCRIPTION - ORIENTATION: ORIENTATION: RIGHT

## 2022-05-31 ASSESSMENT — PAIN DESCRIPTION - LOCATION: LOCATION: SHOULDER

## 2022-05-31 NOTE — PROGRESS NOTES
Pt admitted to  7K19 via via cart/stretcher from ED. Complaints: fall/hyponatremia. IV normal saline infusing into the antecubital right, condition patent and no redness at a rate of 100 mls/ hour with about 900 mls in the bag still. IV site free of s/s of infection or infiltration. Vital signs obtained. Assessment and data collection initiated. Two nurse skin assessment performed by Paris Barrientos and Eliezer Donato RN. Oriented to room. Policies and procedures for 5K explained. All questions answered with no further questions at this time. Fall prevention and safety brochure discussed with patient. Bed alarm on. Call light in reach. Oriented to room.    Nayana Martinez, RN, RN 5/31/2022 5:59 AM

## 2022-05-31 NOTE — PROGRESS NOTES
Wound ostomy consulted for skin tears right wrist POA. Recommend staff follow wound care order sets for skin tears (cuticerin to open areas followed by ABD pad, secure with kerlix or bordered foam dressings to area).

## 2022-05-31 NOTE — FLOWSHEET NOTE
05/31/22 0957   Treatment Team Notification   Reason for Communication Evaluate   Team Member Name Dr. Alvaro Rios  (on call for Dr. Marian Jamison)   Treatment Team Role Consulting Provider   Method of Communication Secure Message   Response Waiting for response   Notification Time 0957     elevated troponin, fall

## 2022-05-31 NOTE — CARE COORDINATION
DISCHARGE/PLANNING EVALUATION  5/31/22, 1:56 PM EDT    Reason for Referral: return to assisted living   Mental Status: alert, answers questions appropriately   Decision Making: can make decisions, son assists with decisions about care   Family/Social/Home Environment:  Stephanie Bryan lives in assisted living at [facility] and plans to return there at discharge  Current Services including food security, transportation and housekeeping:  [facility] provides meals, housekeeping, helps arrange transportation   Current Equipment:   Payment Source: [employer] medicare   Concerns or Barriers to Discharge: plans return to assisted living   Post acute provider list with quality measures, geographic area and applicable managed care information provided. Questions regarding selection process answered: declined, prefers return to [facility] used with patient regarding care plan and discharge plan  Patient  verbalizes understanding of the plan of care and contributes to goal setting. Patient goals, treatment preferences and discharge plan:  Spoke with Stephanie Bryan.  He acknowledges that he is a resident at [facility] and wants to return there at discharge.   Spoke with [facility] admissions, anticipate return to assisted living at discharge    Electronically signed by JESICA Henderson on 5/31/2022 at 1:56 PM

## 2022-05-31 NOTE — PROGRESS NOTES
Contacted our pacemaker clinic to inquire about pacemaker interrogation as ordered. They stated patient does not use our clinic. Patient states he goes through Dr. Handy Barber. Contacted office at [facility] and they will fax over the Joe Bonita Hernan 25 interrogation.

## 2022-05-31 NOTE — RT PROTOCOL NOTE
RT Inhaler-Nebulizer Bronchodilator Protocol Note    There is a bronchodilator order in the chart from a provider indicating to follow the RT Bronchodilator Protocol and there is an Initiate RT Inhaler-Nebulizer Bronchodilator Protocol order as well (see protocol at bottom of note). CXR Findings:  XR CHEST PORTABLE    Result Date: 5/30/2022  1. No acute findings. This document has been electronically signed by: Anival Mayers MD on 05/30/2022 11:49 PM      The findings from the last RT Protocol Assessment were as follows:   History Pulmonary Disease: Chronic pulmonary disease  Respiratory Pattern: Dyspnea on exertion or RR 21-25 bpm  Breath Sounds: Slightly diminished and/or crackles  Cough: Strong, spontaneous, non-productive  Indication for Bronchodilator Therapy: Decreased or absent breath sounds,On home bronchodilators  Bronchodilator Assessment Score: 6    Aerosolized bronchodilator medication orders have been revised according to the RT Inhaler-Nebulizer Bronchodilator Protocol below. Respiratory Therapist to perform RT Therapy Protocol Assessment initially then follow the protocol. Repeat RT Therapy Protocol Assessment PRN for score 0-3 or on second treatment, BID, and PRN for scores above 3. No Indications  adjust the frequency to every 6 hours PRN wheezing or bronchospasm, if no treatments needed after 48 hours then discontinue using Per Protocol order mode. If indication present, adjust the RT bronchodilator orders based on the Bronchodilator Assessment Score as indicated below. Use Inhaler orders unless patient has one or more of the following: on home nebulizer, not able to hold breath for 10 seconds, is not alert and oriented, cannot activate and use MDI correctly, or respiratory rate 25 breaths per minute or more, then use the equivalent nebulizer order(s) with same Frequency and PRN reasons based on the score.   If a patient is on this medication at home then do not decrease Frequency below that used at home. 0-3  enter or revise RT bronchodilator order(s) to equivalent RT Bronchodilator order with Frequency of every 4 hours PRN for wheezing or increased work of breathing using Per Protocol order mode. 4-6  enter or revise RT Bronchodilator order(s) to two equivalent RT bronchodilator orders with one order with BID Frequency and one order with Frequency of every 4 hours PRN wheezing or increased work of breathing using Per Protocol order mode. 7-10  enter or revise RT Bronchodilator order(s) to two equivalent RT bronchodilator orders with one order with TID Frequency and one order with Frequency of every 4 hours PRN wheezing or increased work of breathing using Per Protocol order mode. 11-13  enter or revise RT Bronchodilator order(s) to one equivalent RT bronchodilator order with QID Frequency and an Albuterol order with Frequency of every 4 hours PRN wheezing or increased work of breathing using Per Protocol order mode. Greater than 13  enter or revise RT Bronchodilator order(s) to one equivalent RT bronchodilator order with every 4 hours Frequency and an Albuterol order with Frequency of every 2 hours PRN wheezing or increased work of breathing using Per Protocol order mode. RT to enter RT Home Evaluation for COPD & MDI Assessment order using Per Protocol order mode.     Electronically signed by Jeff Fink RCP on 5/31/2022 at 12:39 PM

## 2022-05-31 NOTE — PROGRESS NOTES
Spoke to [employer] and gave them central fax number.  They are to fax medication list.   Electronically signed by Conni Skiff, RN on 5/31/2022 at 6:57 AM

## 2022-05-31 NOTE — ED NOTES
Pt provided with PO water and urinal. Pt resting on cot. No distress noted.       Jo Alvares RN  05/31/22 8953

## 2022-05-31 NOTE — CARE COORDINATION
5/31/22, 9:02 AM EDT  DISCHARGE PLANNING EVALUATION:    Kyrie Prescott       Admitted: 5/30/2022/ 2213   Hospital day: 0   Location: -19/019-A Reason for admit: Hyponatremia [E87.1]  Elevated troponin [R77.8]  Fall, initial encounter [W19. XXXA]   PMH:  has a past medical history of Arthritis, Asthma, Atrial fibrillation ([facility] [address] CHF (congestive heart failure) ([facility] [address] COPD (chronic obstructive pulmonary disease) ([facility] [address] Encephalopathy acute, Hyperlipidemia, Hypertension, Muscular deconditioning, and Pneumonia. To ED from home complaining of R hip pain after fall at home today. Pt reports he slipped on something on the ground in his kitchen, which caused him to fall and landed on his R hip. CT head negative  Right hip pelvis xray: Severe right hip degenerative change with remodeling. Procedure:Echo pending    Barriers to Discharge:  N/V checks, Na 129 (was 125), elevated troponin x3, IVF stopped, Lovenox, PPI, Hx MRSA, cardiology consulted for pacer interrogation. Wound care order set for skin tears. PCP: Akua Villalobos MD   %  Patient's Healthcare Decision Maker: Named in [address] [place] [address]    Patient Goals/Plan/Treatment Preferences: from [facility] and plans to return; SW following. CM visit deferred with Neal Singh saw pt this morning. Transportation/Food Security/Housekeeping Addressed:  No issues identified.

## 2022-05-31 NOTE — ED TRIAGE NOTES
Pt arrives via EMS for c/o right hip pain. Pt reports he slipped in the kitchen and \"slid\" down the wall. Pt denies any pain at rest but reports he is having right hip pain, which existed prior to his fall. Pt is alert and oriented x 4. Pt reports he takes xarelto. Pt denies head injury or LOC.

## 2022-05-31 NOTE — ED NOTES
ED to inpatient nurses report    Chief Complaint   Patient presents with    Fall    Hip Pain     right      Present to ED from home  LOC: alert and orientated to name, place, date  Vital signs   Vitals:    05/30/22 2339 05/31/22 0127 05/31/22 0227 05/31/22 0352   BP: (!) 161/72 (!) 162/75  (!) 158/69   Pulse: 72 61 62 59   Resp: 16 18 16 16   Temp:       TempSrc:       SpO2: 98% 98% 97% 98%   Weight:       Height:          Oxygen Baseline RA    Current needs required RA   LDAs:   Peripheral IV 05/30/22 Right Antecubital (Active)   Site Assessment Clean, dry & intact 05/31/22 0353   Line Status Infusing 05/31/22 0353   Phlebitis Assessment No symptoms 05/31/22 0353   Infiltration Assessment 0 05/31/22 0353   Dressing Status Clean, dry & intact 05/30/22 2222   Dressing Type Transparent 05/30/22 2222   Dressing Intervention New 05/30/22 2222     Mobility: Requires assistance * 1  Pending ED orders: none  Present condition: stable, denies pain at rest    Electronically signed by Dominik Perkins RN on 5/31/2022 at 4:19 AM       Dominik Perkins RN  05/31/22 1036

## 2022-05-31 NOTE — CONSULTS
[address] [place] [address]                                  CONSULTATION    PATIENT NAME: Nicolle Zurita                    :        1935  MED REC NO:   [id number]                           ROOM:       0019  ACCOUNT NO:   [de-identified]                           ADMIT DATE: 2022  PROVIDER:     KATIE Cruzfrank Geller:  2022    CARDIOLOGY CONSULTATION    REASON FOR CONSULTATION:  Elevated cardiac enzymes. REQUESTING PROVIDER:  Hospitalist Service. HISTORY OF PRESENT ILLNESS:  This is an 71-year-old patient who  apparently follows with Dr. Teofilo Bond, who has a history of pacemaker,  hypertension, hyperlipidemia with no known history of coronary artery  disease, reported by the patient himself. He comes in status post fall,  trauma and so far is being evaluated for significant hyponatremia. Currently the patient has a history of AFib and has been on Xarelto. Unclear of how mobile the patient is. It looks like he uses his walker  at home. Denies any active chest pain. Does have some baseline  shortness of breath. Still being evaluated for his trauma. We were  consulted to assist in the management of the patient given his elevated  cardiac enzymes. REVIEW OF SYSTEMS:  No fever, no chills, no weight loss. No hematuria  or dysuria. No abdominal pain, nausea, vomiting or diarrhea. No  obvious active psych problems or suicidal ideation. No skin rashes. No  obvious dizziness, lightheadedness or loss of conscious; however, the  patient seems to have some instability. No recent trauma except what is  mentioned above. No bleeding problem. No HEENT-related problem. PAST MEDICAL HISTORY:  1. Pacemaker. 2.  Hypertension. 3.  Hyperlipidemia. 4.  AFib. ALLERGIES:  THIAZIDE AND METOLAZONE.     CURRENT MEDICATIONS:  Lipitor 40 a day, digoxin 0.125 a day, Imdur 30 a  day, metoprolol 50 twice a day, Protonix 40 a day, Xarelto 20 a day,  spironolactone 25 a day, Diovan 80 twice a day. SOCIAL HISTORY:  No tobacco, no drugs, no alcohol. FAMILY HISTORY:  Significant for coronary artery disease. PHYSICAL EXAMINATION:  VITAL SIGNS:  Showed a blood pressure of 130/80, heart rate of 70. GENERAL APPEARANCE:  Pleasant gentleman in no obvious acute distress. EYES AND EARS:  No discharge. NECK:  No JVD. No bruits. No masses. LUNGS:  Decreased air entry. No crackles. No wheezes. HEART:  Normal S1, S2. Systolic murmur grade 2/6. ABDOMEN:  Soft, nontender. Positive bowel sounds. No organomegaly. EXTREMITIES:  No significant edema. NEUROLOGIC:  Grossly intact. Awake, alert. No focal deficits. PSYCH:  No obvious active psychosis. SKIN:  No rashes. LABORATORY DATA:  Showed sodium 129, potassium 4.2, BUN 8, creatinine  0.7. White count 10.8, hemoglobin 11.9, hematocrit 34.9, platelets 573. IMPRESSION:  This is a gentleman who comes in with the above  presentation, falling, hyponatremia and who seems to have quite a bit of  limitation. He does have borderline elevation of cardiac enzymes which  seems to be a secondary rather than a primary event. He could have  underlying coronary artery disease, especially with his risk factors. Currently denies any active symptoms. No chest pain and/or shortness of  breath. MY RECOMMENDATIONS:  1. Treat the primary problem and make sure he does not have any  fractures due to the trauma. 2.  Treat the hyponatremia. 3.  Obtain an echo to assess LV function and wall motion. 4.  Once the patient's condition settles down, we will plan on ischemia  workup which could potentially be to start with a stress test to rule  out ischemia and then go from there. We will consider cardiac  catheterization at a later date depending on the findings of the stress  test.    Thank you for allowing me to participate in the care of this patient.         Jose Armando Guadarrama Santos Lopez M.D.    D: 05/31/2022 14:01:43       T: 05/31/2022 14:04:50     [email or web address]  Job#: [id number]     Doc#: [id number]    CC:

## 2022-05-31 NOTE — ED NOTES
Pt aware of plan to admit. Pt resting on cot, watching TV. Call light in reach. No needs expressed at this time.      Ninfa Caraballo RN  05/31/22 0035

## 2022-05-31 NOTE — PLAN OF CARE
If still taking gabapentin 300 mg nightly, increase to 600 mg nightly if tolerated.   Problem: Respiratory - Adult  Goal: Clear lung sounds  Description: Clear lung sounds  Outcome: Progressing   Continue with home meds to improve breath sounds, increase aeration and decrease WOB.

## 2022-05-31 NOTE — ED PROVIDER NOTES
[address] \[facility]\"" [address] [address] EMERGENCY DEPT    EMERGENCY MEDICINE     Pt Name: Tisha Prescott  MRN: [id number]  Dwayne 1935  Date of evaluation: 5/30/2022  Provider: Radha Bermudez MD    [facility]       Chief Complaint   Patient presents with   [place]    Hip Pain     right       HISTORY OF PRESENT ILLNESS    [address] is a pleasant 82 y.o. male   Presents to the emergency department from home complaining of R hip pain after fall at home today. Pt reports he slipped on something on the ground in his kitchen, which caused him to fall and landed on his R hip. He denies any associated light-headedness/dizziness. He did not hit his head or lose consciousness, but he does take xarelto. He also c/o R wrist pain. He denies any weakness/numbness. He denies any other complaints. He reports the pain is mostly when he moves his hip area. Denies any other known exacerbating/relieving factors. Triage notes and Nursing notes were reviewed by myself. Any discrepancies are addressed above.     PAST MEDICAL HISTORY     Past Medical History:   Diagnosis Date    Arthritis     Asthma     Atrial fibrillation ([address]     CHF (congestive heart failure) ([address]     COPD (chronic obstructive pulmonary disease) ([address]     Encephalopathy acute 11/20/2013    Hyperlipidemia     Hypertension     Muscular deconditioning 12/23/2013    Pneumonia        SURGICAL HISTORY       Past Surgical History:   Procedure Laterality Date    ABDOMEN SURGERY      ABDOMEN SURGERY N/A 7/11/2021    LEFT ABDOMEN WALL WOUND DEBRIDEMENT AND REPAIR performed by Lucero Denise MD at [address] [place] [address] EKG 12-LEAD  8/5/2015         FRACTURE SURGERY  unsure    [facility]    OTHER SURGICAL HISTORY  10-30-13    abdominal exploration, closure of evisceration with retention sutures-Dr. Carvajal Gist OTHER SURGICAL HISTORY  10-25-13    Placement of gastrostomy tube-Dr. Neisha Galarza        CURRENT MEDICATIONS       Previous Medications    ACETAMINOPHEN 650 MG TABS    Take 650 mg by mouth every 4 hours as needed    ALBUTEROL (PROVENTIL) (2.5 MG/3ML) 0.083% NEBULIZER SOLUTION    Take 3 mLs by nebulization every 6 hours as needed for Wheezing    ALBUTEROL SULFATE HFA (PROAIR HFA) 108 (90 BASE) MCG/ACT INHALER    INHALE 2 PUFFS BY MOUTH EVERY 4 HOURS AS NEEDED    AMITIZA 24 MCG CAPSULE    TAKE ONE (1) CAPSULE BY MOUTH ONCE DAILY WITH FOOD    CANDESARTAN (ATACAND) 16 MG TABLET    Take 16 mg by mouth 2 times daily    CHOLECALCIFEROL (VITAMIN D) 50 MCG (2000 UT) CAPS CAPSULE    Take 2,000 Units by mouth daily    CLOBETASOL PROPIONATE 0.05 % GEL GEL    Apply 1 applicator topically daily    CLOTRIMAZOLE (LOTRIMIN) 1 % CREAM    APPLY TOPICALLY AS DIRECTED    DILTIAZEM (CARDIZEM CD) 120 MG ER CAPSULE    Take 1 capsule by mouth daily    DOCUSATE SODIUM (COLACE) 100 MG CAPSULE    TAKE ONE (1) CAPSULE BY MOUTH TWICE DAILY    FLUTICASONE (FLONASE) 50 MCG/ACT NASAL SPRAY    INSTILL 1 SPRAY INTRANASALLY DAILY    GUAIFENESIN (ROBITUSSIN) 100 MG/5ML SYRUP    Take 200 mg by mouth 4 times daily as needed for Cough or Congestion     LACTOBACILLUS PO    Take 1 tablet by mouth 2 times daily    LANOXIN 125 MCG TABLET    Take 1 tablet by mouth daily    LATANOPROST (XALATAN) 0.005 % OPHTHALMIC SOLUTION    1 drop nightly    LIFT CHAIR MISC    by Does not apply route    MAGNESIUM HYDROXIDE (MILK OF MAGNESIA CONCENTRATE) 2400 MG/10ML SUSP    Take 2,400 mg by mouth once as needed     METOPROLOL TARTRATE (LOPRESSOR) 25 MG TABLET    Take 2 tablets by mouth every 12 hours    MULTIPLE VITAMINS-MINERALS (CERTAVITE SENIOR/ANTIOXIDANT PO)    Take by mouth    NUTRITIONAL SUPPLEMENTS (ENSURE COMPLETE) LIQD    Take 1 Can by mouth daily     PANTOPRAZOLE (PROTONIX) 40 MG TABLET    Take 40 mg by mouth daily    POLYVINYL ALCOHOL (LIQUIFILM TEARS) 1.4 % OPHTHALMIC SOLUTION    1 drop as needed    PROPYLENE GLYCOL (SYSTANE COMPLETE OP)    Apply 1 drop to eye 4 times daily    SODIUM CHLORIDE 1 G TABLET    Take 1 tablet by mouth 3 times daily (with meals)    SPIRIVA HANDIHALER 18 MCG INHALATION CAPSULE    INHALE THE CONTENTS OF ONE CAPSULE BY MOUTH ONCE DAILY AS DIRECTED    SPIRONOLACTONE (ALDACTONE) 25 MG TABLET    TAKE 1 TABLET BY MOUTH ONCE DAILY    SYMBICORT 160-4.5 MCG/ACT AERO    INHALE 2 (TWO) PUFFS BY MOUTH TWICE DAILY    TRAZODONE (DESYREL) 150 MG TABLET    TAKE (1) TABLET BY MOUTH NIGHTLY AS NEEDED FOR SLEEP    TRIAMCINOLONE (KENALOG) 0.1 % CREAM    Apply 1 each topically every 12 hours as needed (rash) Apply topically 2 times daily.     VITAMIN B-12 (CYANOCOBALAMIN) 100 MCG TABLET    Take 1,000 mcg by mouth daily    XARELTO 20 MG TABS TABLET    TAKE ONE (1) TABLET BY MOUTH EACH EVENING WITH MEAL       ALLERGIES     Metolazone and Thiazide-type diuretics    FAMILY HISTORY       Family History   Problem Relation Age of Onset    Arthritis Father     Asthma Father     Heart Disease Father     High Blood Pressure Father     Cancer Mother         ovarian cancer    Diabetes Mother     Cancer Sister     Heart Disease Sister     Diabetes Brother     Cancer Sister     Heart Disease Sister     Heart Disease Brother     Diabetes Brother     Heart Disease Brother         SOCIAL HISTORY       Social History     Socioeconomic History    Marital status:      Spouse name: Obed Dumont Number of children: 9    Years of education: 6    Highest education level: None   Occupational History    Occupation: retired   Tobacco Use    Smoking status: Former Smoker     Packs/day: 2.00     Years: 30.00     Pack years: 60.00     Types: Cigarettes     Start date: 12/17/1978    Smokeless tobacco: Never Used   Vaping Use    Vaping Use: Never used   Substance and Sexual Activity    Alcohol use: No     Comment: 1 time a week    Drug use: No    Sexual activity: None   Other Topics Concern    None   Social History Narrative    None     Social Determinants of Health     Financial Resource Strain:     Difficulty of Paying Living Expenses: Not on file   Food Insecurity:     Worried About Running Out of Food in the Last Year: Not on file    Joe of Food in the Last Year: Not on file   Transportation Needs:     Lack of Transportation (Medical): Not on file    Lack of Transportation (Non-Medical): Not on file   Physical Activity:     Days of Exercise per Week: Not on file    Minutes of Exercise per Session: Not on file   Stress:     Feeling of Stress : Not on file   Social Connections:     Frequency of Communication with Friends and Family: Not on file    Frequency of Social Gatherings with Friends and Family: Not on file    Attends Zoroastrianism Services: Not on file    Active Member of [address] [place] [address] [employer] or Organizations: Not on file    Attends Club or Organization Meetings: Not on file    Marital Status: Not on file   Intimate Partner Violence:     Fear of Current or Ex-Partner: Not on file    Emotionally Abused: Not on file    Physically Abused: Not on file    Sexually Abused: Not on file   Housing Stability:     Unable to Pay for Housing in the Last Year: Not on file    Number of [place] in the Last Year: Not on file    Unstable Housing in the Last Year: Not on file       REVIEW OF SYSTEMS     Review of Systems   Constitutional: Negative for chills and fever. Respiratory: Negative for cough and shortness of breath. Cardiovascular: Negative for chest pain and leg swelling. Gastrointestinal: Negative for abdominal pain and vomiting. Musculoskeletal: Negative for back pain and neck pain. Skin: Negative for rash and wound. Neurological: Negative for weakness and numbness. All other systems reviewed and are negative. Except as noted above the remainder of the review of systems was reviewed and is.    PHYSICAL EXAM    (up to 7 for level 4, 8 or more for level 5)     ED Triage Vitals [05/30/22 2216]   BP Temp Temp Source Heart Rate Resp SpO2 Height Weight   (!) 152/95 97.8 °F (36.6 °C) Oral 80 18 98 % 6' 1\" (1.854 m) 235 lb (106.6 kg)       Physical Exam  Vitals and nursing note reviewed. Constitutional:       General: He is awake. HENT:      Head: Normocephalic and atraumatic. Mouth/Throat:      Mouth: Mucous membranes are moist.   Eyes:      General: Lids are normal.      Conjunctiva/sclera: Conjunctivae normal.   Neck:      Vascular: No JVD. Trachea: No tracheal deviation. Cardiovascular:      Rate and Rhythm: Normal rate and regular rhythm. Pulses: Normal pulses. Heart sounds: No murmur heard. No friction rub. No gallop. Pulmonary:      Effort: Pulmonary effort is normal.      Breath sounds: Normal breath sounds. No wheezing, rhonchi or rales. Abdominal:      Palpations: Abdomen is soft. Tenderness: There is no abdominal tenderness. Comments: Well healed surgical scarring noted   Musculoskeletal:      Cervical back: Neck supple. Comments: No midline CTLS tenderness/deformity/stepoff, + R posterolateral hip tenderness, no shortening or rotation, R wrist tenderness, superficial skin tear R dorsal wrist, 2+ radial and DP pulses b/l   Skin:     General: Skin is warm. Findings: No rash. Neurological:      General: No focal deficit present. Mental Status: He is alert. Sensory: No sensory deficit. Motor: No weakness. Psychiatric:         Behavior: Behavior is cooperative. DIAGNOSTIC RESULTS     EKG:(none if blank)  All EKG's are interpreted by [facility] Phys[facility] who either signs or Co-signs this chart in the absence of a cardiologist.    2211: V paced rhythm at rate of 70    RADIOLOGY: (none if blank)   Interpretation per the Radiologistbelow, if available at the time of this note:    CT Head WO Contrast   Final Result   No acute intracranial hemorrhage. This document has been electronically signed by:  Smooth Barrett MD on 05/30/2022 11:35 PM      All CTs at this facility use dose modulation techniques and iterative    reconstructions, and/or weight-based dosing   when appropriate to reduce radiation to a low as reasonably achievable. CT Cervical Spine WO Contrast   Final Result   Degenerative spondylosis. No acute fracture. This document has been electronically signed by: Diego Amin MD on 05/30/2022 11:41 PM      All CTs at this facility use dose modulation techniques and iterative    reconstructions, and/or weight-based dosing   when appropriate to reduce radiation to a low as reasonably achievable. XR CHEST PORTABLE   Final Result   1. No acute findings. This document has been electronically signed by: Diego Amin MD on 05/30/2022 11:49 PM      XR HIP 2-3 VW W PELVIS RIGHT   Final Result   Severe right hip degenerative change with remodeling. This document has been electronically signed by: Diego Amin MD on 05/30/2022 11:53 PM      XR WRIST RIGHT (MIN 3 VIEWS)   Final Result   No acute fracture. This document has been electronically signed by:  Diego Amin MD on 05/31/2022 12:09 AM          LABS:  Labs Reviewed   CBC WITH AUTO DIFFERENTIAL - Abnormal; Notable for the following components:       Result Value    RBC 3.83 (*)     Hemoglobin 11.8 (*)     Hematocrit 34.9 (*)     RDW-SD 45.4 (*)     Segs Absolute 8.3 (*)     Immature Grans (Abs) 0.08 (*)     All other components within normal limits   COMPREHENSIVE METABOLIC PANEL W/ REFLEX TO MG FOR LOW K - Abnormal; Notable for the following components:    Glucose 120 (*)     Sodium 125 (*)     Chloride 89 (*)     All other components within normal limits   TROPONIN - Abnormal; Notable for the following components:    Troponin T 0.058 (*)     All other components within normal limits   OSMOLALITY - Abnormal; Notable for the following components:    Osmolality Calc 252.1 (*)     All other components within normal limits   TROPONIN - Abnormal; Notable for the following components:    Troponin T 0.049 (*)     All other components within normal limits   ANION GAP   GLOMERULAR FILTRATION RATE, ESTIMATED       All other labs were within normal range or not returned as of this dictation. Please note, any cultures that may have been sent were not resulted at the time of this patient visit. EMERGENCY DEPARTMENT COURSE andMedical Decision Making:     MDM/   Pt presents to the Er with fall at home, R hip pain, xray neg for acute fx however does show arthritis. Also found to have new elevated troponin, worsening hyponatremia, will admit, IVF, continue to monitor. I spoke with Dr. Mukesh Ledezma, accepts admit. Strict returnprecautions and follow up instructions were discussed with the patient with which the patient agrees      ED Medications administered this visit:    Medications   sodium chloride flush 0.9 % injection 5-40 mL (has no administration in time range)   sodium chloride flush 0.9 % injection 5-40 mL (has no administration in time range)   0.9 % sodium chloride infusion (has no administration in time range)   enoxaparin Sodium (LOVENOX) injection 30 mg (has no administration in time range)   ondansetron (ZOFRAN-ODT) disintegrating tablet 4 mg (has no administration in time range)     Or   ondansetron (ZOFRAN) injection 4 mg (has no administration in time range)   polyethylene glycol (GLYCOLAX) packet 17 g (has no administration in time range)   acetaminophen (TYLENOL) tablet 650 mg (has no administration in time range)     Or   acetaminophen (TYLENOL) suppository 650 mg (has no administration in time range)   0.9 % sodium chloride infusion (has no administration in time range)   acetaminophen (TYLENOL) tablet 1,000 mg (1,000 mg Oral Given 5/30/22 2256)   Tetanus-Diphth-Acell Pertussis (BOOSTRIX) injection 0.5 mL (0.5 mLs IntraMUSCular Given 5/30/22 2257)         Procedures: (None if blank)         CLINICAL IMPRESSION     1. Hyponatremia    2.  Elevated troponin    3. Fall, initial encounter          DISPOSITION/PLAN   DISPOSITION Admitted 05/31/2022 03:14:21 AM      PATIENT REFERRED TO:  No follow-up provider specified.     DISCHARGE MEDICATIONS:  New Prescriptions    No medications on file           (Please note that portions of this note were completed with a voice recognition program.  Efforts were made to edit the dictations but occasionallywords are mis-transcribed.)      Jackeline Cruz MD,FACEP (electronically signed)  Attending Physician, Emergency Department       Omar Martin MD  05/31/22 3143

## 2022-05-31 NOTE — H&P
Internal Medicine  History and Physical    Patient:  Jatin Prescott  MRN: [id number]      History Obtained From:  patient  PCP: Laura Pablo MD    CHIEF COMPLAINT:  Fall, low sodium    HISTORY OF PRESENT ILLNESS:   The patient is a 82 y.o. male who presents with a fall at home. He reports he slipped on something on the ground in his kitchen, which caused him to fall and landed on his R hip. He denies any associated light-headedness/dizziness. He did not hit his head, no LOC. He bruised his skin, he takes Xarelto for a h/o a fib. He denies HA, no neck pain, no CP, no SOB. Seen in ED, skeletal x rays hips and rt hand showed no acute fracture. CT head and C spine were unrevealing for any acute process. He was hyponatremia Na 120, thus admitted for evaluation. Past Medical History:        Diagnosis Date    Arthritis     Asthma     Atrial fibrillation (HCC)     CHF (congestive heart failure) (HCC)     COPD (chronic obstructive pulmonary disease) (HCC)     Encephalopathy acute 11/20/2013    Hyperlipidemia     Hypertension     Muscular deconditioning 12/23/2013    Pneumonia        Past Surgical History:        Procedure Laterality Date    ABDOMEN SURGERY      ABDOMEN SURGERY N/A 7/11/2021    LEFT ABDOMEN WALL WOUND DEBRIDEMENT AND REPAIR performed by Angelito Thorne MD at [address] [place] [address] Drive EKG 12-LEAD  8/5/2015         FRACTURE SURGERY  unsure   [facility]  [address] [place] [address]    OTHER SURGICAL HISTORY  10-30-13    abdominal exploration, closure of evisceration with retention sutures-Dr. Tyler Earl OTHER SURGICAL HISTORY  10-25-13    Placement of gastrostomy tube-Dr. Lopez Organ        Medications Prior to Admission:    Prior to Admission medications    Medication Sig Start Date End Date Taking?  Authorizing Provider   sodium chloride 1 g tablet Take 1 tablet by mouth 3 times daily (with meals) 3/8/22   Laura Pablo MD   clobetasol propionate 0.05 % GEL gel Apply 1 applicator topically daily Historical Provider, MD   LACTOBACILLUS PO Take 1 tablet by mouth 2 times daily    Historical Provider, MD   Propylene Glycol (SYSTANE COMPLETE OP) Apply 1 drop to eye 4 times daily    Historical Provider, MD   metoprolol tartrate (LOPRESSOR) 25 MG tablet Take 2 tablets by mouth every 12 hours 7/13/21   Henri Hahn MD   candesartan (ATACAND) 16 MG tablet Take 16 mg by mouth 2 times daily    Historical Provider, MD   vitamin B-12 (CYANOCOBALAMIN) 100 MCG tablet Take 1,000 mcg by mouth daily    Historical Provider, MD   triamcinolone (KENALOG) 0.1 % cream Apply 1 each topically every 12 hours as needed (rash) Apply topically 2 times daily.     Historical Provider, MD   Cholecalciferol (VITAMIN D) 50 MCG (2000 UT) CAPS capsule Take 2,000 Units by mouth daily    Historical Provider, MD   pantoprazole (PROTONIX) 40 MG tablet Take 40 mg by mouth daily    Historical Provider, MD Buckley Marry 18 MCG inhalation capsule INHALE THE CONTENTS OF ONE CAPSULE BY MOUTH ONCE DAILY AS DIRECTED 12/15/16   TREVA Jesus CNP   docusate sodium (COLACE) 100 MG capsule TAKE ONE (1) CAPSULE BY MOUTH TWICE DAILY  Patient taking differently: Take 100 mg by mouth 2 times daily  10/20/16   Birdie Foster MD   XARELTO 20 MG TABS tablet TAKE ONE (1) TABLET BY MOUTH EACH EVENING WITH MEAL 10/20/16   Birdie Foster MD   albuterol sulfate HFA (PROAIR HFA) 108 (90 BASE) MCG/ACT inhaler INHALE 2 PUFFS BY MOUTH EVERY 4 HOURS AS NEEDED 8/30/16   TREVA Jesus CNP   LANOXIN 125 MCG tablet Take 1 tablet by mouth daily 8/30/16   TREVA Jesus CNP   traZODone (DESYREL) 150 MG tablet TAKE (1) TABLET BY MOUTH NIGHTLY AS NEEDED FOR SLEEP  Patient taking differently: Take 150 mg by mouth nightly Indications: Depression  7/27/16   Birdie Foster MD   fluticasone (FLONASE) 50 MCG/ACT nasal spray INSTILL 1 SPRAY INTRANASALLY DAILY  Patient taking differently: 1 spray daily  7/26/16   Srinivasan Gregory MD   clotrimazole (LOTRIMIN) 1 % cream APPLY TOPICALLY AS DIRECTED  Patient taking differently: Apply 1 applicator topically 2 times daily APPLY TOPICALLY AS DIRECTED 7/20/16   Srinivasan Gregory MD   spironolactone (ALDACTONE) 25 MG tablet TAKE 1 TABLET BY MOUTH ONCE DAILY 4/19/16   Srinivasan Gregory MD   SYMBICORT 160-4.5 MCG/ACT AERO INHALE 2 (TWO) PUFFS BY MOUTH TWICE DAILY 3/1/16   Gama Vela MD   diltiazem (CARDIZEM CD) 120 MG ER capsule Take 1 capsule by mouth daily 1/19/16   Srinivasan Gregory MD   Lift Chair MISC by Does not apply route 1/11/16   Srinivasan Gregory MD   AMITIZA 24 MCG capsule TAKE ONE (1) CAPSULE BY MOUTH ONCE DAILY WITH FOOD 1/7/16   Srinivasan Gregory MD   Multiple Vitamins-Minerals (CERTAVITE SENIOR/ANTIOXIDANT PO) Take by mouth    Historical Provider, MD   polyvinyl alcohol (LIQUIFILM TEARS) 1.4 % ophthalmic solution 1 drop as needed    Historical Provider, MD   latanoprost (XALATAN) 0.005 % ophthalmic solution 1 drop nightly    Historical Provider, MD   magnesium hydroxide (MILK OF MAGNESIA CONCENTRATE) 2400 MG/10ML SUSP Take 2,400 mg by mouth once as needed     Historical Provider, MD   acetaminophen 650 MG TABS Take 650 mg by mouth every 4 hours as needed 7/5/15   Ivana Berman MD   albuterol (PROVENTIL) (2.5 MG/3ML) 0.083% nebulizer solution Take 3 mLs by nebulization every 6 hours as needed for Wheezing  Patient taking differently: Take 2.5 mg by nebulization 2 times daily  7/5/15   Ivana Berman MD   guaiFENesin (ROBITUSSIN) 100 MG/5ML syrup Take 200 mg by mouth 4 times daily as needed for Cough or Congestion     Historical Provider, MD   Nutritional Supplements (ENSURE COMPLETE) LIQD Take 1 Can by mouth daily     Historical Provider, MD       Allergies:  Metolazone and Thiazide-type diuretics    Social History:   TOBACCO:   reports that he has quit smoking.  His smoking use included cigarettes. He started smoking about 43 years ago. He has a 60.00 pack-year smoking history. He has never used smokeless tobacco.  ETOH:   reports no history of alcohol use.       Family History:       Problem Relation Age of Onset    Arthritis Father     Asthma Father     Heart Disease Father     High Blood Pressure Father     Cancer Mother         ovarian cancer    Diabetes Mother     Cancer Sister     Heart Disease Sister     Diabetes Brother     Cancer Sister     Heart Disease Sister     Heart Disease Brother     Diabetes Brother     Heart Disease Brother        REVIEW OF SYSTEMS:  CONSTITUTIONAL:  positive for  fatigue and malaise  negative for  fevers and chills  EYES:  negative for  irritation, redness and icterus  HEENT:  negative for  sore mouth, sore throat and hoarseness  RESPIRATORY:  positive for  dyspnea and wheezing  negative for  dry cough, chest pain and pleuritic pain  CARDIOVASCULAR:  positive for  dyspnea, fatigue  negative for  chest pain, palpitations, orthopnea, PND  GASTROINTESTINAL:  negative for nausea, vomiting, change in bowel habits, abdominal pain, abdominal mass and abdominal distention  GENITOURINARY:  negative for frequency and hematuria  INTEGUMENT/BREAST:  positive for skin bruises  HEMATOLOGIC/LYMPHATIC:  positive for easy bruising and petechiae  negative for lymphadenopathy  ALLERGIC/IMMUNOLOGIC:  negative  ENDOCRINE:  negative for heat intolerance and cold intolerance  MUSCULOSKELETAL:  positive for  arthralgias, stiff joints and muscle weakness  negative for  decreased range of motion  NEUROLOGICAL:  positive for weakness  negative for headaches, dizziness, seizures and memory problems  BEHAVIOR/PSYCH:  positive for decreased energy level and fatigue and negative for increased agitation and anxiety    Physical Exam:    Vitals: BP (!) 169/73   Pulse 64   Temp 98.2 °F (36.8 °C) (Oral)   Resp 20   Ht 6' 1\" (1.854 m)   Wt 235 lb (106.6 kg)   SpO2 99% BMI 31.00 kg/m²   CONSTITUTIONAL:  awake, alert, cooperative, no apparent distress, and appears stated age  EYES:  extra-ocular muscles intact  ENT:  atraumatic  NECK:  supple, symmetrical, trachea midline  HEMATOLOGIC/LYMPHATICS:  no cervical lymphadenopathy and no supraclavicular lymphadenopathy  BACK:  symmetric  LUNGS:  no increased work of breathing and diminished breath sounds right base and left base  CARDIOVASCULAR:  normal S1 and S2 and no edema  ABDOMEN:  normal bowel sounds, soft, non-distended, non-tender and no hepatosplenomegally  CHEST/BREASTS:  no axillary or supraclavicular adenopathy  MUSCULOSKELETAL:  there is no redness, warmth, or swelling of the joints  Tender ROM rt hip  NEUROLOGIC:  Mental Status Exam:  Level of Alertness:   awake  Orientation:   person, place, time  Memory:   normal  Fund of Knowledge:  normal  Cranial Nerves:  cranial nerves II-XII are grossly intact  Motor Exam:  Motor exam is symmetrical 5 out of 5 all extremities bilaterally  Sensory:  Sensory intact  SKIN:  ecchymosis scattered, on right and left elbow(s) and on right hand(s)      CBC:   Recent Labs     05/30/22 2223   WBC 10.8   HGB 11.8*        BMP:    Recent Labs     05/30/22 2223 05/31/22  0806   * 131*   K 4.7 4.2   CL 89* 95*   CO2 26 26   BUN 11 8   CREATININE 0.8 0.7   GLUCOSE 120* 110*     Hepatic:   Recent Labs     05/30/22 2223   AST 26   ALT 18   BILITOT 0.3   ALKPHOS 63     Troponin T 0.058, 0.049 Abnormal        PA/lat CXR:   XR CHEST PORTABLE [[id number]]    Collected: 05/30/22 2249    Updated: 05/30/22 2350    Narrative:     Findings:   Right lower lung calcified granuloma. No change. No consolidation or effusion. Heart size approaching the upper limits in size. Left subclavian single   lead pacer. No acute fracture. Impression:     1. No acute findings. XR HIP 2-3 VW W PELVIS RIGHT   The bones are intact.    Severe right hip degenerative change with femoral head and acetabular   deformity. The soft tissues are unremarkable. Impression:     Severe right hip degenerative change with remodeling. XR WRIST RIGHT (MIN 3 VIEWS  Findings:   Bones intact. No dislocations. Erosions involving the 3rd metacarpal head appear chronic. There is   overlying soft tissue swelling and soft tissue calcification. The etiology   is not evident. Moderate 1st carpometacarpal degenerative. Mild triscaphe degenerative change. No radiopaque foreign body. Prominent vascular calcifications. Impression:     No acute fracture. CT Head WO Contrast [[id number]]    Collected: 05/30/22 2235    Updated: 05/30/22 2336    Narrative:     CT head without contrast     Comparison: None     Findings:   No intra-axial mass, midline shift, hydrocephalus, or acute hemorrhage. Moderate atrophy unchanged. Mild-to-moderate nonspecific white matter hypodensity. Small amount of mucosal thickening in the sinuses. No fluid levels. Mastoids are clear. The orbits are unremarkable. No skull fracture. Impression:     No acute intracranial hemorrhage     CT Cervical Spine WO Contrast [[id number]]    Collected: 05/30/22 2241    Updated: 05/30/22 2342    Narrative:     CT cervical spine without contrast     Comparison: None     Findings:   Visualized intracranial contents are unremarkable. Soft tissues of the neck are normal.   Lung apices are clear. Slight convex left curvature. No acute fractures or dislocations. Moderate to severe disc disease with small marginal osteophytes. Moderate to severe multilevel facet disease, slightly worse on the left   than right. Moderate to severe neural foraminal stenosis throughout the cervical   spine. Impression:     Degenerative spondylosis. No acute fracture. EKG:           Assessment and Plan   1. Hyponatremia, chronic, symptomatic   - check S/U Osm, urine lytes  2. Fall  3.  Elevated troponin   - ASA, BB, statin   -ECHO   - cardiology eval  4. HTN  5. Chronic a fib, PPM status  6. COPD  7. OA hips/ bone contusions s/p fall    H/o SIADH, restrict free water  Check urine Osm, Na  Serum Osm  serial Na  Resume bronchodilators  Cardizem, dig, BB  Pacer interrogation, cardiology eval      Patient Active Problem List   Diagnosis Code    COPD (chronic obstructive pulmonary disease) with acute bronchitis ([facility]) J44.0, J20.9    Encephalopathy acute G93.40    Muscular deconditioning R29.898    Constipation, chronic K59.09    Dermatitis L30.9    COPD (chronic obstructive pulmonary disease) ([facility] [address] J44.9    COPD with exacerbation ([facility] [address] J44.1    Obesity (BMI 30-39. 9) E66.9    COPD with exacerbation ([facility]) J44.1    Acute bronchitis J20.9    Hypertension I10    Atrial fibrillation ([facility]) I48.91    SSS (sick sinus syndrome) ([facility]) I49.5    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[facility]) I49.5    Atrial fibrillation, chronic ([facility]) I48.20    S/P cardiac pacemaker procedure Z95.0    NSTEMI (non-ST elevated myocardial infarction) ([facility]) I21.4    Respiratory failure with hypercapnia ([facility]) J96.92    Pneumonia J18.9    Pulmonary edema cardiac cause ([facility]) I50.1    Respiratory distress R06.03    COPD exacerbation ([facility]) J44.1    CHF (congestive heart failure) ([facility]) Z74.3    Systolic CHF, acute on chronic ([facility]) I50.23    Hyperglycemia R73.9    Hyponatremia E87.1    Abdominal wall abscess L02. 211    Cellulitis and abscess of leg L03.119, L02.419       Henri Hahn MD, MD  Admitting Internist

## 2022-05-31 NOTE — PROGRESS NOTES
Called [facility] to obtain home medication list. Staff informed me that they did not have access to that and that a nurse would call back.    Electronically signed by Amy Mcfarland RN on 5/31/2022 at 5:59 AM

## 2022-05-31 NOTE — PLAN OF CARE
Problem: Discharge Planning  Goal: Discharge to home or other facility with appropriate resources  Outcome: Progressing  Flowsheets  Taken 5/31/2022 1650 by Renee Gan RN  Discharge to home or other facility with appropriate resources:   Identify barriers to discharge with patient and caregiver   Arrange for needed discharge resources and transportation as appropriate   Identify discharge learning needs (meds, wound care, etc)  Problem: Pain  Goal: Verbalizes/displays adequate comfort level or baseline comfort level  Outcome: Progressing  Flowsheets (Taken 5/31/2022 1650)  Verbalizes/displays adequate comfort level or baseline comfort level:   Encourage patient to monitor pain and request assistance   Assess pain using appropriate pain scale   Administer analgesics based on type and severity of pain and evaluate response     Problem: Safety - Adult  Goal: Free from fall injury  Outcome: Progressing  Flowsheets (Taken 5/31/2022 1650)  Free From Fall Injury: Instruct family/caregiver on patient safety     Problem: Skin/Tissue Integrity  Goal: Absence of new skin breakdown  Description: 1. Monitor for areas of redness and/or skin breakdown  2. Assess vascular access sites hourly  3. Every 4-6 hours minimum:  Change oxygen saturation probe site  4. Every 4-6 hours:  If on nasal continuous positive airway pressure, respiratory therapy assess nares and determine need for appliance change or resting period. Outcome: Progressing  Note: Wound care nurses consulted. Patient has multiple skin tears.       Problem: Chronic Conditions and Co-morbidities  Goal: Patient's chronic conditions and co-morbidity symptoms are monitored and maintained or improved  Outcome: Progressing  Flowsheets (Taken 5/31/2022 1650)  Care Plan - Patient's Chronic Conditions and Co-Morbidity Symptoms are Monitored and Maintained or Improved: Monitor and assess patient's chronic conditions and comorbid symptoms for stability, deterioration, or improvement

## 2022-06-01 LAB
ANION GAP SERPL CALCULATED.3IONS-SCNC: 11 MEQ/L (ref 8–16)
BUN BLDV-MCNC: 11 MG/DL (ref 7–22)
CALCIUM SERPL-MCNC: 9 MG/DL (ref 8.5–10.5)
CHLORIDE BLD-SCNC: 95 MEQ/L (ref 98–111)
CHOLESTEROL, TOTAL: 147 MG/DL (ref 100–199)
CO2: 24 MEQ/L (ref 23–33)
CREAT SERPL-MCNC: 0.7 MG/DL (ref 0.4–1.2)
GFR SERPL CREATININE-BSD FRML MDRD: > 90 ML/MIN/1.73M2
GLUCOSE BLD-MCNC: 98 MG/DL (ref 70–108)
HDLC SERPL-MCNC: 59 MG/DL
LDL CHOLESTEROL CALCULATED: 77 MG/DL
POTASSIUM SERPL-SCNC: 4.3 MEQ/L (ref 3.5–5.2)
SODIUM BLD-SCNC: 129 MEQ/L (ref 135–145)
SODIUM BLD-SCNC: 130 MEQ/L (ref 135–145)
TRIGL SERPL-MCNC: 55 MG/DL (ref 0–199)

## 2022-06-01 PROCEDURE — G0378 HOSPITAL OBSERVATION PER HR: HCPCS

## 2022-06-01 PROCEDURE — 6370000000 HC RX 637 (ALT 250 FOR IP): Performed by: INTERNAL MEDICINE

## 2022-06-01 PROCEDURE — 2580000003 HC RX 258: Performed by: INTERNAL MEDICINE

## 2022-06-01 PROCEDURE — 94760 N-INVAS EAR/PLS OXIMETRY 1: CPT

## 2022-06-01 PROCEDURE — 94640 AIRWAY INHALATION TREATMENT: CPT

## 2022-06-01 PROCEDURE — 6360000002 HC RX W HCPCS: Performed by: INTERNAL MEDICINE

## 2022-06-01 PROCEDURE — 36415 COLL VENOUS BLD VENIPUNCTURE: CPT

## 2022-06-01 PROCEDURE — 80061 LIPID PANEL: CPT

## 2022-06-01 PROCEDURE — 80048 BASIC METABOLIC PNL TOTAL CA: CPT

## 2022-06-01 RX ADMIN — POLYVINYL ALCOHOL 1 DROP: 14 SOLUTION/ DROPS OPHTHALMIC at 15:52

## 2022-06-01 RX ADMIN — POLYETHYLENE GLYCOL 3350 17 G: 17 POWDER, FOR SOLUTION ORAL at 15:52

## 2022-06-01 RX ADMIN — ALBUTEROL SULFATE 2.5 MG: 2.5 SOLUTION RESPIRATORY (INHALATION) at 16:09

## 2022-06-01 RX ADMIN — SODIUM CHLORIDE, PRESERVATIVE FREE 10 ML: 5 INJECTION INTRAVENOUS at 21:24

## 2022-06-01 RX ADMIN — SODIUM CHLORIDE, PRESERVATIVE FREE 10 ML: 5 INJECTION INTRAVENOUS at 08:56

## 2022-06-01 RX ADMIN — DIGOXIN 125 MCG: 125 TABLET ORAL at 08:52

## 2022-06-01 RX ADMIN — ACETAMINOPHEN 650 MG: 325 TABLET ORAL at 21:18

## 2022-06-01 RX ADMIN — METOPROLOL TARTRATE 50 MG: 50 TABLET, FILM COATED ORAL at 21:18

## 2022-06-01 RX ADMIN — SODIUM CHLORIDE 1 G: 1 TABLET ORAL at 15:52

## 2022-06-01 RX ADMIN — PANTOPRAZOLE SODIUM 40 MG: 40 TABLET, DELAYED RELEASE ORAL at 08:52

## 2022-06-01 RX ADMIN — VALSARTAN 80 MG: 80 TABLET, FILM COATED ORAL at 08:52

## 2022-06-01 RX ADMIN — Medication 2000 UNITS: at 08:52

## 2022-06-01 RX ADMIN — MOMETASONE FUROATE AND FORMOTEROL FUMARATE DIHYDRATE 2 PUFF: 200; 5 AEROSOL RESPIRATORY (INHALATION) at 06:03

## 2022-06-01 RX ADMIN — MOMETASONE FUROATE AND FORMOTEROL FUMARATE DIHYDRATE 2 PUFF: 200; 5 AEROSOL RESPIRATORY (INHALATION) at 16:09

## 2022-06-01 RX ADMIN — ACETAMINOPHEN 650 MG: 325 TABLET ORAL at 03:43

## 2022-06-01 RX ADMIN — TIOTROPIUM BROMIDE INHALATION SPRAY 2 PUFF: 3.12 SPRAY, METERED RESPIRATORY (INHALATION) at 06:03

## 2022-06-01 RX ADMIN — METOPROLOL TARTRATE 50 MG: 50 TABLET, FILM COATED ORAL at 08:53

## 2022-06-01 RX ADMIN — ALBUTEROL SULFATE 2.5 MG: 2.5 SOLUTION RESPIRATORY (INHALATION) at 06:00

## 2022-06-01 RX ADMIN — RIVAROXABAN 20 MG: 20 TABLET, FILM COATED ORAL at 17:34

## 2022-06-01 RX ADMIN — Medication 1000 MCG: at 08:52

## 2022-06-01 RX ADMIN — VALSARTAN 80 MG: 80 TABLET, FILM COATED ORAL at 21:18

## 2022-06-01 RX ADMIN — ISOSORBIDE MONONITRATE 30 MG: 30 TABLET, EXTENDED RELEASE ORAL at 08:53

## 2022-06-01 RX ADMIN — DOCUSATE SODIUM 100 MG: 100 CAPSULE, LIQUID FILLED ORAL at 08:52

## 2022-06-01 RX ADMIN — ATORVASTATIN CALCIUM 40 MG: 40 TABLET, FILM COATED ORAL at 21:18

## 2022-06-01 RX ADMIN — SODIUM CHLORIDE 1 G: 1 TABLET ORAL at 12:11

## 2022-06-01 RX ADMIN — POLYVINYL ALCOHOL 1 DROP: 14 SOLUTION/ DROPS OPHTHALMIC at 21:17

## 2022-06-01 RX ADMIN — DILTIAZEM HYDROCHLORIDE 120 MG: 120 CAPSULE, COATED, EXTENDED RELEASE ORAL at 08:52

## 2022-06-01 RX ADMIN — SPIRONOLACTONE 25 MG: 25 TABLET ORAL at 08:52

## 2022-06-01 RX ADMIN — Medication 1 APPLICATOR: at 21:24

## 2022-06-01 RX ADMIN — Medication 1 APPLICATOR: at 08:49

## 2022-06-01 RX ADMIN — POLYVINYL ALCOHOL 1 DROP: 14 SOLUTION/ DROPS OPHTHALMIC at 08:55

## 2022-06-01 RX ADMIN — SODIUM CHLORIDE 1 G: 1 TABLET ORAL at 08:52

## 2022-06-01 RX ADMIN — POLYVINYL ALCOHOL 1 DROP: 14 SOLUTION/ DROPS OPHTHALMIC at 12:12

## 2022-06-01 RX ADMIN — LATANOPROST 1 DROP: 50 SOLUTION OPHTHALMIC at 21:18

## 2022-06-01 RX ADMIN — CLOBETASOL PROPIONATE 1 APPLICATOR: 0.5 CREAM TOPICAL at 08:49

## 2022-06-01 ASSESSMENT — PAIN SCALES - GENERAL
PAINLEVEL_OUTOF10: 4
PAINLEVEL_OUTOF10: 7
PAINLEVEL_OUTOF10: 0

## 2022-06-01 ASSESSMENT — PAIN DESCRIPTION - LOCATION
LOCATION: HIP
LOCATION: HIP;BUTTOCKS

## 2022-06-01 ASSESSMENT — PAIN - FUNCTIONAL ASSESSMENT: PAIN_FUNCTIONAL_ASSESSMENT: ACTIVITIES ARE NOT PREVENTED

## 2022-06-01 NOTE — PLAN OF CARE
Problem: Discharge Planning  Goal: Discharge to home or other facility with appropriate resources  Outcome: Progressing  Flowsheets  Taken 6/1/2022 1414 by Nicolette Schreiber LPN  Discharge to home or other facility with appropriate resources:   Identify barriers to discharge with patient and caregiver   Arrange for needed discharge resources and transportation as appropriate   Identify discharge learning needs (meds, wound care, etc)   Refer to discharge planning if patient needs post-hospital services based on physician order or complex needs related to functional status, cognitive ability or social support system  Taken 6/1/2022 0800 by Harriet Warren RN  Discharge to home or other facility with appropriate resources:   Identify barriers to discharge with patient and caregiver   Arrange for needed discharge resources and transportation as appropriate     Problem: Pain  Goal: Verbalizes/displays adequate comfort level or baseline comfort level  6/1/2022 1414 by Nicolette Schreiber LPN  Outcome: Progressing  Flowsheets (Taken 6/1/2022 1414)  Verbalizes/displays adequate comfort level or baseline comfort level:   Encourage patient to monitor pain and request assistance   Assess pain using appropriate pain scale   Administer analgesics based on type and severity of pain and evaluate response   Implement non-pharmacological measures as appropriate and evaluate response   Consider cultural and social influences on pain and pain management   Notify Licensed Independent Practitioner if interventions unsuccessful or patient reports new pain  6/1/2022 0540 by Casey Mathews RN  Note: Patient complains of pain in hips and buttocks. Tylenol given for pain.  Patient verbalizes adequate pain relief with tylenol      Problem: Safety - Adult  Goal: Free from fall injury  6/1/2022 1414 by Nicolette Schreiber LPN  Outcome: Progressing  Flowsheets  Taken 6/1/2022 1414 by Nicolette Schreiber LPN  Free From Fall Injury: Instruct family/caregiver on patient safety  Taken 6/1/2022 1407 by Milton Dougherty RN  Free From Fall Injury: Instruct family/caregiver on patient safety  6/1/2022 0540 by Remy Carter RN  Outcome: Progressing  Note: Patient up wit walker and gait belt and one assist.      Problem: ABCDS Injury Assessment  Goal: Absence of physical injury  Outcome: Progressing  Flowsheets  Taken 6/1/2022 1414 by Kelvin Banda LPN  Absence of Physical Injury: Implement safety measures based on patient assessment  Taken 6/1/2022 1407 by Milton Dougherty RN  Absence of Physical Injury: Implement safety measures based on patient assessment     Problem: Skin/Tissue Integrity  Goal: Absence of new skin breakdown  Description: 1. Monitor for areas of redness and/or skin breakdown  2. Assess vascular access sites hourly  3. Every 4-6 hours minimum:  Change oxygen saturation probe site  4. Every 4-6 hours:  If on nasal continuous positive airway pressure, respiratory therapy assess nares and determine need for appliance change or resting period. 6/1/2022 1414 by Kelvin Banda LPN  Outcome: Progressing  Note: BUE dsgs changed this morning per wound care orders  6/1/2022 0540 by Remy Carter RN  Outcome: Progressing  Note: Patient has multiple skin tears and bruising, skin tears being dressed with dressings. If tape has to be used, staff to use extra caution when removing any tape from skin.  Turn and reposition frequently      Problem: Chronic Conditions and Co-morbidities  Goal: Patient's chronic conditions and co-morbidity symptoms are monitored and maintained or improved  Outcome: Progressing  Flowsheets  Taken 6/1/2022 1414 by Kelvin Banda, [address] [place] [address] Patient's Chronic Conditions and Co-Morbidity Symptoms are Monitored and Maintained or Improved:   Monitor and assess patient's chronic conditions and comorbid symptoms for stability, deterioration, or improvement   Collaborate with multidisciplinary team to address chronic and comorbid conditions and prevent exacerbation or deterioration   Update acute care plan with appropriate goals if chronic or comorbid symptoms are exacerbated and prevent overall improvement and discharge  Taken 6/1/2022 0800 by Carolin Souza 34 - Patient's Chronic Conditions and Co-Morbidity Symptoms are Monitored and Maintained or Improved:   Monitor and assess patient's chronic conditions and comorbid symptoms for stability, deterioration, or improvement   Collaborate with multidisciplinary team to address chronic and comorbid conditions and prevent exacerbation or deterioration     Problem: Respiratory - Adult  Goal: Clear lung sounds  Description: Clear lung sounds  6/1/2022 1414 by Melissa Kay LPN  Outcome: Progressing  6/1/2022 0604 by Constance Wick RCP  Outcome: Progressing  6/1/2022 0540 by Hans Londono RN  Outcome: Progressing  Note: Lung sounds lexie.       Problem: Musculoskeletal - Adult  Goal: Return ADL status to a safe level of function  Outcome: Progressing  Flowsheets (Taken 6/1/2022 1414)  Return ADL Status to a Safe Level of Function:   Administer medication as ordered   Assess activities of daily living deficits and provide assistive devices as needed   Obtain physical therapy/occupational therapy consults as needed   Assist and instruct patient to increase activity and self care as tolerated     Problem: Gastrointestinal - Adult  Goal: Maintains or returns to baseline bowel function  Outcome: Progressing  Flowsheets (Taken 6/1/2022 1414)  Maintains or returns to baseline bowel function: Assess bowel function     Problem: Genitourinary - Adult  Goal: Absence of urinary retention  Outcome: Progressing  Flowsheets (Taken 6/1/2022 1414)  Absence of urinary retention:   Assess patients ability to void and empty bladder   Monitor intake/output and perform bladder scan as needed     Problem: Hematologic - Adult  Goal: Maintains hematologic stability  Outcome: Progressing  Flowsheets (Taken 6/1/2022 1414)  Maintains hematologic stability:   Assess for signs and symptoms of bleeding or hemorrhage   Monitor labs for bleeding or clotting disorders   Care plan reviewed with patient. Patient verbalizes understanding of the plan of care and contributes to goal setting.

## 2022-06-01 NOTE — PLAN OF CARE
Problem: Respiratory - Adult  Goal: Clear lung sounds  Description: Clear lung sounds  6/1/2022 0604 by Prince Mynor RCP  Outcome: Progressing   Breath sounds are clear and diminished at this time. Continue with treatments as ordered.

## 2022-06-01 NOTE — PLAN OF CARE
Problem: Pain  Goal: Verbalizes/displays adequate comfort level or baseline comfort level  6/1/2022 0540 by Melany Mao RN  Note: Patient complains of pain in hips and buttocks. Tylenol given for pain. Patient verbalizes adequate pain relief with tylenol   5/31/2022 1650 by Sola Montesinos RN  Outcome: Progressing  Flowsheets (Taken 5/31/2022 1650)  Verbalizes/displays adequate comfort level or baseline comfort level:   Encourage patient to monitor pain and request assistance   Assess pain using appropriate pain scale   Administer analgesics based on type and severity of pain and evaluate response     Problem: Safety - Adult  Goal: Free from fall injury  6/1/2022 0540 by Melany Mao RN  Outcome: Progressing  Note: Patient up wit walker and gait belt and one assist.   5/31/2022 1650 by Sola Montesinos RN  Outcome: Progressing  Flowsheets (Taken 5/31/2022 1650)  Free From Fall Injury: Instruct family/caregiver on patient safety     Problem: ABCDS Injury Assessment  Goal: Absence of physical injury  Recent Flowsheet Documentation  Taken 5/31/2022 2304 by Melany Mao RN  Absence of Physical Injury: Implement safety measures based on patient assessment  5/31/2022 1650 by Sola Montesinos RN  Outcome: Progressing     Problem: Skin/Tissue Integrity  Goal: Absence of new skin breakdown  Description: 1. Monitor for areas of redness and/or skin breakdown  2. Assess vascular access sites hourly  3. Every 4-6 hours minimum:  Change oxygen saturation probe site  4. Every 4-6 hours:  If on nasal continuous positive airway pressure, respiratory therapy assess nares and determine need for appliance change or resting period. 6/1/2022 0540 by Melany Mao RN  Outcome: Progressing  Note: Patient has multiple skin tears and bruising, skin tears being dressed with dressings. If tape has to be used, staff to use extra caution when removing any tape from skin.  Turn and reposition frequently   5/31/2022 1650 by Alvera Hoots, RN  Outcome: Progressing  Note: Wound care nurses consulted. Patient has multiple skin tears. Problem: Chronic Conditions and Co-morbidities  Goal: Patient's chronic conditions and co-morbidity symptoms are monitored and maintained or improved  5/31/2022 1650 by Halley Riggs RN  Outcome: Progressing  Flowsheets  Taken 5/31/2022 1650 by Carolin Johnson 34 - Patient's Chronic Conditions and Co-Morbidity Symptoms are Monitored and Maintained or Improved: Monitor and assess patient's chronic conditions and comorbid symptoms for stability, deterioration, or improvement  Taken 5/31/2022 0830 by [facility] - Patient's Chronic Conditions and Co-Morbidity Symptoms are Monitored and Maintained or Improved: Monitor and assess patient's chronic conditions and comorbid symptoms for stability, deterioration, or improvement     Problem: Respiratory - Adult  Goal: Clear lung sounds  Description: Clear lung sounds  6/1/2022 0540 by Carla Alex RN  Outcome: Progressing  Note: Lung sounds lexie. 5/31/2022 1650 by Halley Riggs RN  Outcome: Progressing       Care plan reviewed with patient. Patient verbalize understanding of the plan of care and contribute to goal setting.

## 2022-06-01 NOTE — PLAN OF CARE
Problem: Respiratory - Adult  Goal: Clear lung sounds  Description: Clear lung sounds  6/1/2022 1636 by Josh Castorena RCP  Outcome: Progressing  Note:  Patient lung sounds are considered normal for their current lung condition. No signs of distress noted.  Current treatment regimen appropriate

## 2022-06-01 NOTE — PROGRESS NOTES
INTERNAL MEDICINE Progress Note  6/1/2022 2:02 PM  Subjective:   Admit Date: 5/30/2022  PCP: Akua Villalobos MD  Interval History:  Feels better today, no CP, no SOB, no dizziness    Objective:   Vitals: /63   Pulse 65   Temp 97.8 °F (36.6 °C) (Oral)   Resp 18   Ht 6' 1\" (1.854 m)   Wt 235 lb (106.6 kg)   SpO2 95%   BMI 31.00 kg/m²   General appearance: alert and cooperative with exam  HEENT: Head: atraumatic  Neck: no adenopathy, no carotid bruit and no JVD  Lungs: clear to auscultation bilaterally  Heart: regular rate and rhythm and S1, S2 normal  Abdomen: soft, non-tender; bowel sounds normal; no masses,  no organomegaly  Extremities: no edema, redness or tenderness in the calves or thighs, bruises both arms  Neurologic: Mental status: Alert, oriented, thought content appropriate      Medications:   Scheduled Meds:   sodium chloride flush  5-40 mL IntraVENous 2 times per day    docusate sodium  100 mg Oral Daily    pantoprazole  40 mg Oral QAM AC    dilTIAZem  120 mg Oral Daily    digoxin  125 mcg Oral Daily    spironolactone  25 mg Oral Daily    valsartan  80 mg Oral 2 times per day    Vitamin D  2,000 Units Oral Daily    clobetasol  1 applicator Topical Daily    clotrimazole  1 applicator Topical BID    latanoprost  1 drop Both Eyes Nightly    metoprolol tartrate  50 mg Oral 2 times per day    polyvinyl alcohol  1 drop Ophthalmic 4x daily    sodium chloride  1 g Oral TID WC    tiotropium  2 puff Inhalation Daily    mometasone-formoterol  2 puff Inhalation BID    vitamin B-12  1,000 mcg Oral Daily    rivaroxaban  20 mg Oral Dinner    atorvastatin  40 mg Oral Nightly    isosorbide mononitrate  30 mg Oral Daily    albuterol  2.5 mg Nebulization BID     Continuous Infusions:   sodium chloride         Lab Results:   CBC:   Recent Labs     05/30/22 2223   WBC 10.8   HGB 11.8*        BMP:    Recent Labs     05/30/22 2223 05/30/22 2223 05/31/22  0806 05/31/22  1139 05/31/22  1812 05/31/22  2334 06/01/22  0607   *   < > 131*   < > 130* 129* 130*   K 4.7  --  4.2  --   --   --  4.3   CL 89*  --  95*  --   --   --  95*   CO2 26  --  26  --   --   --  24   BUN 11  --  8  --   --   --  11   CREATININE 0.8  --  0.7  --   --   --  0.7   GLUCOSE 120*  --  110*  --   --   --  98    < > = values in this interval not displayed. Hepatic:   Recent Labs     05/30/22  2223   AST 26   ALT 18   BILITOT 0.3   ALKPHOS 63     Lipids:   Recent Labs     06/01/22  0607   CHOL 147   HDL 59     Magnesium:    Lab Results   Component Value Date    MG 1.9 11/13/2016     TSH:    Lab Results   Component Value Date    TSH 1.160 03/05/2022           Assessment and Plan:   1. Hyponatremia, chronic, symptomatic              - tests c/w SIADH   - hold SSRIs  2. Fall  3. Elevated troponin              - cont ASA, BB, statin              -ECHO              - cardiology planning stress test in am  4. HTN  5. Chronic a fib, PPM status  6. COPD  7. OA hips/ bone contusions s/p fall     restrict free water  Am bmp  Cont meds as above.     Panchito Avila MD, MD

## 2022-06-02 LAB
ANION GAP SERPL CALCULATED.3IONS-SCNC: 13 MEQ/L (ref 8–16)
BUN BLDV-MCNC: 17 MG/DL (ref 7–22)
CALCIUM SERPL-MCNC: 9.6 MG/DL (ref 8.5–10.5)
CHLORIDE BLD-SCNC: 90 MEQ/L (ref 98–111)
CO2: 22 MEQ/L (ref 23–33)
CREAT SERPL-MCNC: 0.7 MG/DL (ref 0.4–1.2)
GFR SERPL CREATININE-BSD FRML MDRD: > 90 ML/MIN/1.73M2
GLUCOSE BLD-MCNC: 98 MG/DL (ref 70–108)
POTASSIUM SERPL-SCNC: 4.7 MEQ/L (ref 3.5–5.2)
SODIUM BLD-SCNC: 125 MEQ/L (ref 135–145)

## 2022-06-02 PROCEDURE — 36415 COLL VENOUS BLD VENIPUNCTURE: CPT

## 2022-06-02 PROCEDURE — 6370000000 HC RX 637 (ALT 250 FOR IP): Performed by: INTERNAL MEDICINE

## 2022-06-02 PROCEDURE — 94760 N-INVAS EAR/PLS OXIMETRY 1: CPT

## 2022-06-02 PROCEDURE — 2580000003 HC RX 258: Performed by: INTERNAL MEDICINE

## 2022-06-02 PROCEDURE — 6360000002 HC RX W HCPCS: Performed by: INTERNAL MEDICINE

## 2022-06-02 PROCEDURE — 80048 BASIC METABOLIC PNL TOTAL CA: CPT

## 2022-06-02 PROCEDURE — 99232 SBSQ HOSP IP/OBS MODERATE 35: CPT | Performed by: NURSE PRACTITIONER

## 2022-06-02 PROCEDURE — 1200000000 HC SEMI PRIVATE

## 2022-06-02 PROCEDURE — 94640 AIRWAY INHALATION TREATMENT: CPT

## 2022-06-02 RX ORDER — TOLVAPTAN 15 MG/1
15 TABLET ORAL DAILY
Status: DISCONTINUED | OUTPATIENT
Start: 2022-06-02 | End: 2022-06-02 | Stop reason: ALTCHOICE

## 2022-06-02 RX ORDER — TOLVAPTAN 15 MG/1
15 TABLET ORAL DAILY
Status: DISCONTINUED | OUTPATIENT
Start: 2022-06-03 | End: 2022-06-03 | Stop reason: HOSPADM

## 2022-06-02 RX ORDER — ATORVASTATIN CALCIUM 40 MG/1
40 TABLET, FILM COATED ORAL NIGHTLY
Qty: 30 TABLET | Refills: 3 | Status: SHIPPED | OUTPATIENT
Start: 2022-06-02

## 2022-06-02 RX ORDER — ISOSORBIDE MONONITRATE 30 MG/1
30 TABLET, EXTENDED RELEASE ORAL DAILY
Qty: 30 TABLET | Refills: 3 | Status: SHIPPED | OUTPATIENT
Start: 2022-06-03

## 2022-06-02 RX ADMIN — SPIRONOLACTONE 25 MG: 25 TABLET ORAL at 09:32

## 2022-06-02 RX ADMIN — VALSARTAN 80 MG: 80 TABLET, FILM COATED ORAL at 09:32

## 2022-06-02 RX ADMIN — ALBUTEROL SULFATE 2.5 MG: 2.5 SOLUTION RESPIRATORY (INHALATION) at 15:50

## 2022-06-02 RX ADMIN — SODIUM CHLORIDE, PRESERVATIVE FREE 10 ML: 5 INJECTION INTRAVENOUS at 09:31

## 2022-06-02 RX ADMIN — RIVAROXABAN 20 MG: 20 TABLET, FILM COATED ORAL at 18:11

## 2022-06-02 RX ADMIN — TIOTROPIUM BROMIDE INHALATION SPRAY 2 PUFF: 3.12 SPRAY, METERED RESPIRATORY (INHALATION) at 05:49

## 2022-06-02 RX ADMIN — Medication 1 APPLICATOR: at 20:56

## 2022-06-02 RX ADMIN — DIGOXIN 125 MCG: 125 TABLET ORAL at 09:32

## 2022-06-02 RX ADMIN — Medication 15 MG: at 20:54

## 2022-06-02 RX ADMIN — Medication 1000 MCG: at 09:32

## 2022-06-02 RX ADMIN — SODIUM CHLORIDE, PRESERVATIVE FREE 10 ML: 5 INJECTION INTRAVENOUS at 20:55

## 2022-06-02 RX ADMIN — SODIUM CHLORIDE 1 G: 1 TABLET ORAL at 11:32

## 2022-06-02 RX ADMIN — MOMETASONE FUROATE AND FORMOTEROL FUMARATE DIHYDRATE 2 PUFF: 200; 5 AEROSOL RESPIRATORY (INHALATION) at 15:49

## 2022-06-02 RX ADMIN — ATORVASTATIN CALCIUM 40 MG: 40 TABLET, FILM COATED ORAL at 20:55

## 2022-06-02 RX ADMIN — Medication 2000 UNITS: at 09:32

## 2022-06-02 RX ADMIN — CLOBETASOL PROPIONATE 1 APPLICATOR: 0.5 CREAM TOPICAL at 09:33

## 2022-06-02 RX ADMIN — Medication 1 APPLICATOR: at 09:32

## 2022-06-02 RX ADMIN — DOCUSATE SODIUM 100 MG: 100 CAPSULE, LIQUID FILLED ORAL at 09:31

## 2022-06-02 RX ADMIN — ISOSORBIDE MONONITRATE 30 MG: 30 TABLET, EXTENDED RELEASE ORAL at 09:32

## 2022-06-02 RX ADMIN — LATANOPROST 1 DROP: 50 SOLUTION OPHTHALMIC at 20:55

## 2022-06-02 RX ADMIN — POLYVINYL ALCOHOL 1 DROP: 14 SOLUTION/ DROPS OPHTHALMIC at 18:11

## 2022-06-02 RX ADMIN — METOPROLOL TARTRATE 50 MG: 50 TABLET, FILM COATED ORAL at 20:55

## 2022-06-02 RX ADMIN — DILTIAZEM HYDROCHLORIDE 120 MG: 120 CAPSULE, COATED, EXTENDED RELEASE ORAL at 09:32

## 2022-06-02 RX ADMIN — POLYVINYL ALCOHOL 1 DROP: 14 SOLUTION/ DROPS OPHTHALMIC at 09:32

## 2022-06-02 RX ADMIN — ALBUTEROL SULFATE 2.5 MG: 2.5 SOLUTION RESPIRATORY (INHALATION) at 05:48

## 2022-06-02 RX ADMIN — MOMETASONE FUROATE AND FORMOTEROL FUMARATE DIHYDRATE 2 PUFF: 200; 5 AEROSOL RESPIRATORY (INHALATION) at 05:49

## 2022-06-02 RX ADMIN — SODIUM CHLORIDE 1 G: 1 TABLET ORAL at 18:11

## 2022-06-02 RX ADMIN — PANTOPRAZOLE SODIUM 40 MG: 40 TABLET, DELAYED RELEASE ORAL at 05:35

## 2022-06-02 RX ADMIN — POLYVINYL ALCOHOL 1 DROP: 14 SOLUTION/ DROPS OPHTHALMIC at 11:32

## 2022-06-02 RX ADMIN — POLYVINYL ALCOHOL 1 DROP: 14 SOLUTION/ DROPS OPHTHALMIC at 20:55

## 2022-06-02 RX ADMIN — VALSARTAN 80 MG: 80 TABLET, FILM COATED ORAL at 20:55

## 2022-06-02 RX ADMIN — SODIUM CHLORIDE 1 G: 1 TABLET ORAL at 09:32

## 2022-06-02 RX ADMIN — METOPROLOL TARTRATE 50 MG: 50 TABLET, FILM COATED ORAL at 09:32

## 2022-06-02 RX ADMIN — ACETAMINOPHEN 650 MG: 325 TABLET ORAL at 20:53

## 2022-06-02 ASSESSMENT — PAIN DESCRIPTION - LOCATION: LOCATION: HEAD;HIP

## 2022-06-02 ASSESSMENT — PAIN DESCRIPTION - ORIENTATION: ORIENTATION: RIGHT

## 2022-06-02 ASSESSMENT — PAIN SCALES - GENERAL
PAINLEVEL_OUTOF10: 0
PAINLEVEL_OUTOF10: 0
PAINLEVEL_OUTOF10: 4

## 2022-06-02 NOTE — PROGRESS NOTES
Transport here to transport this patient for stress test. Patient refusing stress test at this time.  RN and Stress lab notified of refusal. Tonia Thomas, covering for Dr. Regino Stanley notified of patients refusal.

## 2022-06-02 NOTE — PROGRESS NOTES
Patient stated that he wants to refuse stress test in AM. Will reevaluate with patient in AM to determine if he is willing to have testing done.

## 2022-06-02 NOTE — PLAN OF CARE
monitored and maintained or improved  Outcome: Progressing  Flowsheets (Taken 6/1/2022 2100)  Care Plan - Patient's Chronic Conditions and Co-Morbidity Symptoms are Monitored and Maintained or Improved: Monitor and assess patient's chronic conditions and comorbid symptoms for stability, deterioration, or improvement     Problem: Respiratory - Adult  Goal: Clear lung sounds  Description: Clear lung sounds  6/2/2022 0531 by Courtney Adams RN  Outcome: Progressing  6/1/2022 1636 by Angela Avila RCP  Outcome: Progressing  Note:  Patient lung sounds are considered normal for their current lung condition. No signs of distress noted. Current treatment regimen appropriate        Problem: Musculoskeletal - Adult  Goal: Return ADL status to a safe level of function  Outcome: Progressing  Flowsheets (Taken 6/1/2022 2100)  Return ADL Status to a Safe Level of Function:   Administer medication as ordered   Assess activities of daily living deficits and provide assistive devices as needed   Care plan reviewed with patient. Patient  verbalize understanding of the plan of care and contribute to goal setting.

## 2022-06-02 NOTE — PROGRESS NOTES
Patient sitting up in chair in room. Dressings to bilateral arms and right upper back changed at this time. Irrigated each skin tear with saline and patted dry before applying dressings. Healing well.

## 2022-06-02 NOTE — PLAN OF CARE
Problem: Respiratory - Adult  Goal: Clear lung sounds  Description: Clear lung sounds  6/2/2022 0553 by Sophia Lee RCP  Outcome: Progressing    Patient lung sounds are considered normal for their current lung condition. No signs of distress noted.  Current treatment regimen appropriate

## 2022-06-02 NOTE — PROGRESS NOTES
INTERNAL MEDICINE Progress Note  6/2/2022 6:50 PM  Subjective:   Admit Date: 5/30/2022  PCP: Anderson Milan MD  Interval History:    Refused cardiac stress test-  Per cardiology  Feels better today,  no CP, no SOB, no dizziness    Objective:   Vitals: BP (!) 154/83   Pulse 65   Temp 97.5 °F (36.4 °C) (Oral)   Resp 20   Ht 6' 1\" (1.854 m)   Wt 235 lb (106.6 kg)   SpO2 97%   BMI 31.00 kg/m²   General appearance: alert and cooperative with exam  HEENT:  atraumatic  Neck:no JVD  Lungs: clear to auscultation bilaterally  Heart: regular rate and rhythm and S1, S2 normal  Abdomen: soft, non-tender; bowel sounds normal; no masses,  no organomegaly  Extremities: no edema, bruises both arms  Neurologic: Alert, oriented, thought content appropriate      Medications:   Scheduled Meds:   sodium chloride flush  5-40 mL IntraVENous 2 times per day    docusate sodium  100 mg Oral Daily    pantoprazole  40 mg Oral QAM AC    dilTIAZem  120 mg Oral Daily    digoxin  125 mcg Oral Daily    spironolactone  25 mg Oral Daily    valsartan  80 mg Oral 2 times per day    Vitamin D  2,000 Units Oral Daily    clobetasol  1 applicator Topical Daily    clotrimazole  1 applicator Topical BID    latanoprost  1 drop Both Eyes Nightly    metoprolol tartrate  50 mg Oral 2 times per day    polyvinyl alcohol  1 drop Ophthalmic 4x daily    sodium chloride  1 g Oral TID WC    tiotropium  2 puff Inhalation Daily    mometasone-formoterol  2 puff Inhalation BID    vitamin B-12  1,000 mcg Oral Daily    rivaroxaban  20 mg Oral Dinner    atorvastatin  40 mg Oral Nightly    isosorbide mononitrate  30 mg Oral Daily    albuterol  2.5 mg Nebulization BID     Continuous Infusions:   sodium chloride         Lab Results:   CBC:   Recent Labs     05/30/22  2223   WBC 10.8   HGB 11.8*        BMP:    Recent Labs     05/31/22  0806 05/31/22  1139 05/31/22  2334 06/01/22  0607 06/02/22  1731   *   < > 129* 130* 125*   K 4.2  -- --  4.3 4.7   CL 95*  --   --  95* 90*   CO2 26  --   --  24 22*   BUN 8  --   --  11 17   CREATININE 0.7  --   --  0.7 0.7   GLUCOSE 110*  --   --  98 98    < > = values in this interval not displayed. Hepatic:   Recent Labs     05/30/22  2223   AST 26   ALT 18   BILITOT 0.3   ALKPHOS 63     Lipids:   Recent Labs     06/01/22  0607   CHOL 147   HDL 59     Magnesium:    Lab Results   Component Value Date    MG 1.9 11/13/2016     TSH:    Lab Results   Component Value Date    TSH 1.160 03/05/2022       Assessment and Plan:   1. Hyponatremia, chronic, symptomatic              - tests c/w SIADH   - hold SSRIs  2. Fall  3. Elevated troponin              - cont ASA, BB, statin              -ECHO              - cardiology planning stress test in am  4. HTN  5. Chronic a fib, PPM status  6. COPD  7. OA hips/ bone contusions s/p fall     Low na despite fluid restriction, start Samsca  Cont meds as above.   Kit Bermudez MD, MD

## 2022-06-02 NOTE — PROGRESS NOTES
Cardiology Progress Note      Patient:  Anibal Prescott  YOB: 1935  MRN: [id number]   Acct: [de-identified]  Admit Date:  5/30/2022  Primary Cardiologist: Dr. Howard Cantu Cardiologist:  Chery Anderson MD    Rationale for Cardiology consult: Elevated cardiac enzymes. HPI/PMH: per Dr. Caryn Coles consult note of 5/31/22: This is an 82-year-old patient who  apparently follows with Dr. Cora Vásquez, who has a history of pacemaker,  hypertension, hyperlipidemia with no known history of coronary artery  disease, reported by the patient himself. He comes in status post fall,  trauma and so far is being evaluated for significant hyponatremia. Currently the patient has a history of AFib and has been on Xarelto. Unclear of how mobile the patient is. It looks like he uses his walker  at home. Denies any active chest pain. Does have some baseline  shortness of breath. Still being evaluated for his trauma. We were  consulted to assist in the management of the patient given his elevated  cardiac enzymes. PAST MEDICAL HISTORY:  1. Pacemaker. 2.  Hypertension. 3.  Hyperlipidemia. 4.  AFib. Chief Complaint: \"Feel good today\". Subjective (Events in last 24 hours): Stable OVN  VSS  Denies chest discomfort or dyspnea  No lightheadedness or dizziness  Tolerating up in room  Refused stress test this AM; states \"my heart is fine - was just sore from my fall\". Objective:   BP (!) 142/70   Pulse 81   Temp 97.3 °F (36.3 °C) (Oral)   Resp 18   Ht 6' 1\" (1.854 m)   Wt 235 lb (106.6 kg)   SpO2 95%   BMI 31.00 kg/m²        TELEMETRY: Sr 60 - 80's    Physical Exam:  General Appearance: alert and oriented to person, place and time, in no acute distress, up in chair in room. Bright, pleasant, talkative.    Cardiovascular: normal rate, regular rhythm, normal S1 and S2, no murmurs, rubs, clicks, or gallops, distal pulses intact, no JVD  Pulmonary/Chest: clear to auscultation bilaterally- no wheezes, rales 9436 8/4/15 2320 8/4/15 5987 8/4/15 0606 6/28/15 0950 6/28/15 0950   Ventricular Rate BPM 70  64  61  76  76  98  66  66    Atrial Rate BPM 72   57  96  96  133  340  340    QRS Duration ms 158  94  192  88  88  92  90  90    Q-T Interval ms 414  402  534  380  380  312  394  394    QTc Calculation (Bazett) ms 447  414  537  427  427  398  413  413    R Axis degrees 73  52  72  74  74  76  81  81    T Axis degrees -169  100  -10  54  54  24  30  30    Resulting Agency  [address]             Narrative & Impression    Ventricular-paced rhythm  Abnormal ECG  When compared with ECG of 05-MAR-2022 06:55,  Ventricular rate has increased BY   6 BPM  Confirmed by GALINA ASHER ([id number]) on 5/31/2022 6:58:55 AM               Echo: 5/31/22  Summary   Ejection fraction is visually estimated at 50%. Overall left ventricular function is normal.   Aortic valve appears tricuspid. Thickened aortic valve leaflets noted. Aortic valve leaflets are Moderately calcified. Mild aortic stenosis is present. Myxomatotic degeneration of mitral valve. Calcification of the mitral valve noted. Decreased mitral valve mobility noted. Mild mitral regurgitation is present. Signature    ----------------------------------------------------------------   Electronically signed by Rosi Sibley MD (Interpreting   physician) on 05/31/2022 at 03:14 PM    Echo: 2015  SUMMARY:     Left ventricle:  Size was normal.  Systolic function was normal. Ejection fraction was estimated in the range of 55 % to 65 %. There were no regional wall motion abnormalities.   Prepared and signed by    Elijah Medina MD  Signed 05-Aug-2015 21:16:57      Stress: 8/7/2015  IMPRESSIONS:   -  The stress ECG was non-diagnostic due to paced Rhythm after vasodilation and low level exercise  without reproduction of symptoms.  -  There was image artifact, without diagnostic evidence for perfusion abnormality.  -  Myocardial perfusion imaging is not suggestive for myocardial ischemia. -  Left ventricular systolic function was normal.     Prepared and signed by  Edison Villegas MD  Signed 08/07/2015 15:12:16    Left Heart Cath: none    Lab Data:    Cardiac Enzymes:  No results for input(s): CKTOTAL, CKMB, CKMBINDEX, TROPONINI in the last 72 hours.     CBC:   Lab Results   Component Value Date    WBC 10.8 05/30/2022    RBC 3.83 05/30/2022    HGB 11.8 05/30/2022    HCT 34.9 05/30/2022     05/30/2022       CMP:    Lab Results   Component Value Date     06/01/2022    K 4.3 06/01/2022    K 4.7 05/30/2022    CL 95 06/01/2022    CO2 24 06/01/2022    BUN 11 06/01/2022    CREATININE 0.7 06/01/2022    LABGLOM >90 06/01/2022    GLUCOSE 98 06/01/2022    CALCIUM 9.0 06/01/2022       Hepatic Function Panel:    Lab Results   Component Value Date    ALKPHOS 63 05/30/2022    ALT 18 05/30/2022    AST 26 05/30/2022    PROT 6.3 05/30/2022    BILITOT 0.3 05/30/2022    BILIDIR <0.2 03/04/2022    LABALBU 3.8 05/30/2022       Magnesium:    Lab Results   Component Value Date    MG 1.9 11/13/2016       PT/INR:    Lab Results   Component Value Date    INR 1.06 11/05/2013       HgBA1c:  No results found for: LABA1C    FLP:    Lab Results   Component Value Date    TRIG 55 06/01/2022    HDL 59 06/01/2022    LDLCALC 77 06/01/2022       TSH:    Lab Results   Component Value Date    TSH 1.160 03/05/2022         Assessment/Plan:  · Trauma by fall  · Slipped in his kitchen; landed on R hip; presented with R hip pain and R wrist pain  · No fx  · No head strike or LOC  ·  CT head neg for acute findings  · Hyponatremia  · Na 120 on presentation; neurologically intact  · Na 130 today (129, 125)  · Management per primary team  · Elevated troponin  · Troponin 0.058 on admit; with down-trending pattern of 0.049, 0.044  · No known hx CAD; does have risk factors  · Denies chest discomfort or dyspnea  · Stress test scheduled for today - refuses; wants to follow-up with Dr. Kwaku Serrano as OP  · Lipitor  · IMDUR added 5/31  · Pacemaker  · Follows with Dr. Kwaku Serrano  · Hypertension  · Candasartan PTA  · Losartan currently  · Aldactone PTA and continues  · Hyperlipidemia  · AFib. · Metoprolol 50 BID PTA - continues  · Lanoxin 0.125 PTA - continues  · Xarelto 20 HS PTA - continues      Currently stable from cardiac standpoint. Increase activity. Wants to follow-up with Dr. Kwaku Serrano - refuses stress test at this time. Cardiology will see on prn basis. Call if change in clinical condition warranting more immediate ischemic evaluation. Follow-up with Dr. Kwaku Serrano in 2 - 4 weeks post discharge.         Electronically signed by TREVA Nicholson CNP on 6/2/2022 at 11:47 AM

## 2022-06-03 VITALS
TEMPERATURE: 98.1 F | HEART RATE: 70 BPM | HEIGHT: 73 IN | OXYGEN SATURATION: 98 % | BODY MASS INDEX: 31.14 KG/M2 | SYSTOLIC BLOOD PRESSURE: 163 MMHG | WEIGHT: 235 LBS | DIASTOLIC BLOOD PRESSURE: 69 MMHG | RESPIRATION RATE: 16 BRPM

## 2022-06-03 PROBLEM — E22.2 SIADH (SYNDROME OF INAPPROPRIATE ADH PRODUCTION) (HCC): Status: ACTIVE | Noted: 2022-06-03

## 2022-06-03 LAB
ANION GAP SERPL CALCULATED.3IONS-SCNC: 13 MEQ/L (ref 8–16)
BUN BLDV-MCNC: 16 MG/DL (ref 7–22)
CALCIUM SERPL-MCNC: 9.7 MG/DL (ref 8.5–10.5)
CHLORIDE BLD-SCNC: 96 MEQ/L (ref 98–111)
CO2: 22 MEQ/L (ref 23–33)
CREAT SERPL-MCNC: 0.6 MG/DL (ref 0.4–1.2)
GFR SERPL CREATININE-BSD FRML MDRD: > 90 ML/MIN/1.73M2
GLUCOSE BLD-MCNC: 95 MG/DL (ref 70–108)
POTASSIUM SERPL-SCNC: 4.9 MEQ/L (ref 3.5–5.2)
SODIUM BLD-SCNC: 131 MEQ/L (ref 135–145)

## 2022-06-03 PROCEDURE — 6370000000 HC RX 637 (ALT 250 FOR IP): Performed by: INTERNAL MEDICINE

## 2022-06-03 PROCEDURE — 6360000002 HC RX W HCPCS: Performed by: INTERNAL MEDICINE

## 2022-06-03 PROCEDURE — 80048 BASIC METABOLIC PNL TOTAL CA: CPT

## 2022-06-03 PROCEDURE — 36415 COLL VENOUS BLD VENIPUNCTURE: CPT

## 2022-06-03 PROCEDURE — 94640 AIRWAY INHALATION TREATMENT: CPT

## 2022-06-03 PROCEDURE — 94760 N-INVAS EAR/PLS OXIMETRY 1: CPT

## 2022-06-03 RX ADMIN — MOMETASONE FUROATE AND FORMOTEROL FUMARATE DIHYDRATE 2 PUFF: 200; 5 AEROSOL RESPIRATORY (INHALATION) at 06:32

## 2022-06-03 RX ADMIN — SODIUM CHLORIDE 1 G: 1 TABLET ORAL at 08:55

## 2022-06-03 RX ADMIN — CLOBETASOL PROPIONATE 1 APPLICATOR: 0.5 CREAM TOPICAL at 08:58

## 2022-06-03 RX ADMIN — PANTOPRAZOLE SODIUM 40 MG: 40 TABLET, DELAYED RELEASE ORAL at 05:19

## 2022-06-03 RX ADMIN — Medication 15 MG: at 08:59

## 2022-06-03 RX ADMIN — DIGOXIN 125 MCG: 125 TABLET ORAL at 08:55

## 2022-06-03 RX ADMIN — Medication 1 APPLICATOR: at 08:58

## 2022-06-03 RX ADMIN — POLYVINYL ALCOHOL 1 DROP: 14 SOLUTION/ DROPS OPHTHALMIC at 08:58

## 2022-06-03 RX ADMIN — Medication 1000 MCG: at 08:55

## 2022-06-03 RX ADMIN — DOCUSATE SODIUM 100 MG: 100 CAPSULE, LIQUID FILLED ORAL at 08:55

## 2022-06-03 RX ADMIN — DILTIAZEM HYDROCHLORIDE 120 MG: 120 CAPSULE, COATED, EXTENDED RELEASE ORAL at 08:55

## 2022-06-03 RX ADMIN — SODIUM CHLORIDE 1 G: 1 TABLET ORAL at 11:19

## 2022-06-03 RX ADMIN — VALSARTAN 80 MG: 80 TABLET, FILM COATED ORAL at 08:55

## 2022-06-03 RX ADMIN — TIOTROPIUM BROMIDE INHALATION SPRAY 2 PUFF: 3.12 SPRAY, METERED RESPIRATORY (INHALATION) at 06:30

## 2022-06-03 RX ADMIN — ISOSORBIDE MONONITRATE 30 MG: 30 TABLET, EXTENDED RELEASE ORAL at 08:55

## 2022-06-03 RX ADMIN — POLYVINYL ALCOHOL 1 DROP: 14 SOLUTION/ DROPS OPHTHALMIC at 11:19

## 2022-06-03 RX ADMIN — Medication 2000 UNITS: at 08:55

## 2022-06-03 RX ADMIN — METOPROLOL TARTRATE 50 MG: 50 TABLET, FILM COATED ORAL at 08:55

## 2022-06-03 RX ADMIN — ALBUTEROL SULFATE 2.5 MG: 2.5 SOLUTION RESPIRATORY (INHALATION) at 06:21

## 2022-06-03 ASSESSMENT — PAIN SCALES - GENERAL: PAINLEVEL_OUTOF10: 0

## 2022-06-03 NOTE — PROGRESS NOTES
INTERNAL MEDICINE Progress Note  6/3/2022 1:06 PM  Subjective:   Admit Date: 5/30/2022  PCP: Arline Cruz MD  Interval History:    Doing well today  no CP, no SOB, no dizziness    Objective:   Vitals: BP (!) 163/69   Pulse 70   Temp 98.1 °F (36.7 °C) (Oral)   Resp 16   Ht 6' 1\" (1.854 m)   Wt 235 lb (106.6 kg)   SpO2 98%   BMI 31.00 kg/m²   General appearance: alert and cooperative with exam  HEENT:  atraumatic  Neck:no JVD  Lungs: clear to auscultation bilaterally  Heart: regular rate and rhythm and S1, S2 normal  Abdomen: soft, non-tender; bowel sounds normal; no masses,  no organomegaly  Extremities: no edema, bruises both arms  Neurologic: Alert, oriented, thought content appropriate      Medications:   Scheduled Meds:   tolvaptan  15 mg Oral Daily    sodium chloride flush  5-40 mL IntraVENous 2 times per day    docusate sodium  100 mg Oral Daily    pantoprazole  40 mg Oral QAM AC    dilTIAZem  120 mg Oral Daily    digoxin  125 mcg Oral Daily    [Held by provider] spironolactone  25 mg Oral Daily    valsartan  80 mg Oral 2 times per day    Vitamin D  2,000 Units Oral Daily    clobetasol  1 applicator Topical Daily    clotrimazole  1 applicator Topical BID    latanoprost  1 drop Both Eyes Nightly    metoprolol tartrate  50 mg Oral 2 times per day    polyvinyl alcohol  1 drop Ophthalmic 4x daily    sodium chloride  1 g Oral TID WC    tiotropium  2 puff Inhalation Daily    mometasone-formoterol  2 puff Inhalation BID    vitamin B-12  1,000 mcg Oral Daily    rivaroxaban  20 mg Oral Dinner    atorvastatin  40 mg Oral Nightly    isosorbide mononitrate  30 mg Oral Daily    albuterol  2.5 mg Nebulization BID     Continuous Infusions:   sodium chloride         Lab Results:   CBC:   No results for input(s): WBC, HGB, PLT in the last 72 hours.   BMP:    Recent Labs     06/01/22  0607 06/02/22  1731 06/03/22  0615   * 125* 131*   K 4.3 4.7 4.9   CL 95* 90* 96*   CO2 24 22* 22*   BUN 11 17 16   CREATININE 0.7 0.7 0.6   GLUCOSE 98 98 95     Hepatic:   No results for input(s): AST, ALT, ALB, BILITOT, ALKPHOS in the last 72 hours. Lipids:   Recent Labs     06/01/22  0607   CHOL 147   HDL 59     Magnesium:    Lab Results   Component Value Date    MG 1.9 11/13/2016     TSH:    Lab Results   Component Value Date    TSH 1.160 03/05/2022       Assessment and Plan:   1. Hyponatremia, chronic, symptomatic              - tests c/w SIADH   - hold SSRIs, was on Fluoxetine at home  2. Fall  3. Elevated troponin              - cont ASA, BB, statin              -ECHO              - cardiology planning stress test in am  4. HTN  5. Chronic a fib, PPM status  6. COPD  7. OA hips/ bone contusions s/p fall     Stop fluoxetine  Restrict free water to 1L per day  Cont meds as above. Dc home. See office 1 week withbmp.     Nayana Michaels MD, MD

## 2022-06-03 NOTE — RT PROTOCOL NOTE
RT Inhaler-Nebulizer Bronchodilator Protocol Note    There is a bronchodilator order in the chart from a provider indicating to follow the RT Bronchodilator Protocol and there is an Initiate RT Inhaler-Nebulizer Bronchodilator Protocol order as well (see protocol at bottom of note). CXR Findings:  No results found. The findings from the last RT Protocol Assessment were as follows:   History Pulmonary Disease: Chronic pulmonary disease  Respiratory Pattern: Regular pattern and RR 12-20 bpm  Breath Sounds: Slightly diminished and/or crackles  Cough: Strong, spontaneous, non-productive  Indication for Bronchodilator Therapy: Decreased or absent breath sounds,On home bronchodilators  Bronchodilator Assessment Score: 4    Aerosolized bronchodilator medication orders have been revised according to the RT Inhaler-Nebulizer Bronchodilator Protocol below. Respiratory Therapist to perform RT Therapy Protocol Assessment initially then follow the protocol. Repeat RT Therapy Protocol Assessment PRN for score 0-3 or on second treatment, BID, and PRN for scores above 3. No Indications  adjust the frequency to every 6 hours PRN wheezing or bronchospasm, if no treatments needed after 48 hours then discontinue using Per Protocol order mode. If indication present, adjust the RT bronchodilator orders based on the Bronchodilator Assessment Score as indicated below. Use Inhaler orders unless patient has one or more of the following: on home nebulizer, not able to hold breath for 10 seconds, is not alert and oriented, cannot activate and use MDI correctly, or respiratory rate 25 breaths per minute or more, then use the equivalent nebulizer order(s) with same Frequency and PRN reasons based on the score. If a patient is on this medication at home then do not decrease Frequency below that used at home.     0-3  enter or revise RT bronchodilator order(s) to equivalent RT Bronchodilator order with Frequency of every 4 hours PRN for wheezing or increased work of breathing using Per Protocol order mode. 4-6  enter or revise RT Bronchodilator order(s) to two equivalent RT bronchodilator orders with one order with BID Frequency and one order with Frequency of every 4 hours PRN wheezing or increased work of breathing using Per Protocol order mode. 7-10  enter or revise RT Bronchodilator order(s) to two equivalent RT bronchodilator orders with one order with TID Frequency and one order with Frequency of every 4 hours PRN wheezing or increased work of breathing using Per Protocol order mode. 11-13  enter or revise RT Bronchodilator order(s) to one equivalent RT bronchodilator order with QID Frequency and an Albuterol order with Frequency of every 4 hours PRN wheezing or increased work of breathing using Per Protocol order mode. Greater than 13  enter or revise RT Bronchodilator order(s) to one equivalent RT bronchodilator order with every 4 hours Frequency and an Albuterol order with Frequency of every 2 hours PRN wheezing or increased work of breathing using Per Protocol order mode. RT to enter RT Home Evaluation for COPD & MDI Assessment order using Per Protocol order mode.     Electronically signed by Jefry Hernandez RCP on 6/3/2022 at 6:26 AM

## 2022-06-03 NOTE — PLAN OF CARE
Problem: Discharge Planning  Goal: Discharge to home or other facility with appropriate resources  6/3/2022 1046 by Tamara Arroyo LPN  Outcome: Progressing  Flowsheets (Taken 6/2/2022 2238 by Jose Jeffrey LPN)  Discharge to home or other facility with appropriate resources:   Identify barriers to discharge with patient and caregiver   Arrange for needed discharge resources and transportation as appropriate   Identify discharge learning needs (meds, wound care, etc)   Refer to discharge planning if patient needs post-hospital services based on physician order or complex needs related to functional status, cognitive ability or social support system  6/2/2022 2242 by Jose Jeffrey LPN  Outcome: Progressing  Flowsheets  Taken 6/2/2022 2238 by Jose Jeffrey LPN  Discharge to home or other facility with appropriate resources:   Identify barriers to discharge with patient and caregiver   Arrange for needed discharge resources and transportation as appropriate   Identify discharge learning needs (meds, wound care, etc)   Refer to discharge planning if patient needs post-hospital services based on physician order or complex needs related to functional status, cognitive ability or social support system  Taken 6/2/2022 2048 by Mckenna Iverson RN  Discharge to home or other facility with appropriate resources: Identify barriers to discharge with patient and caregiver  Note: Case management to assist with discharge planning. Patient expected to return to [facility] living at discharge.       Problem: Pain  Goal: Verbalizes/displays adequate comfort level or baseline comfort level  6/3/2022 1046 by Tamara Arroyo LPN  Outcome: Progressing  Flowsheets (Taken 6/2/2022 2239 by Jose Jeffrey LPN)  Verbalizes/displays adequate comfort level or baseline comfort level:   Assess pain using appropriate pain scale   Encourage patient to monitor pain and request assistance   Administer analgesics based on type and severity of pain and evaluate response   Implement non-pharmacological measures as appropriate and evaluate response  6/2/2022 2242 by Laurie Reyez LPN  Outcome: Progressing  Flowsheets (Taken 6/2/2022 2239)  Verbalizes/displays adequate comfort level or baseline comfort level:   Assess pain using appropriate pain scale   Encourage patient to monitor pain and request assistance   Administer analgesics based on type and severity of pain and evaluate response   Implement non-pharmacological measures as appropriate and evaluate response  Note: Pain well managed at this time. Tylenol 650 Q6 hours ordered PRN. Problem: Safety - Adult  Goal: Free from fall injury  6/3/2022 1046 by Anamaria Santa LPN  Outcome: Progressing  Flowsheets (Taken 6/2/2022 2239 by Laurie Reyez LPN)  Free From Fall Injury: Instruct family/caregiver on patient safety  6/2/2022 2242 by Laurie Reyez LPN  Outcome: Progressing  Flowsheets  Taken 6/2/2022 2239  Free From Fall Injury: Valente Walker family/caregiver on patient safety  Taken 6/2/2022 2236  Free From Fall Injury: Instruct family/caregiver on patient safety  Note: Patient remains free of falls at this time. Appropriate fall prevention interventions in place at this time. Problem: ABCDS Injury Assessment  Goal: Absence of physical injury  6/3/2022 1046 by Anamaria Santa LPN  Outcome: Progressing  Flowsheets (Taken 6/2/2022 2239 by Laurie Reyez LPN)  Absence of Physical Injury: Implement safety measures based on patient assessment  6/2/2022 2242 by Laurie Reyez LPN  Outcome: Progressing  Flowsheets  Taken 6/2/2022 2239  Absence of Physical Injury: Implement safety measures based on patient assessment  Taken 6/2/2022 2236  Absence of Physical Injury: Implement safety measures based on patient assessment     Problem: Skin/Tissue Integrity  Goal: Absence of new skin breakdown  Description: 1. Monitor for areas of redness and/or skin breakdown  2. Assess vascular access sites hourly  3. Every 4-6 hours minimum:  Change oxygen saturation probe site  4. Every 4-6 hours:  If on nasal continuous positive airway pressure, respiratory therapy assess nares and determine need for appliance change or resting period.   6/3/2022 1046 by Cristina Field LPN  Outcome: Progressing  Note: No new skin breakdown noted at this time   6/2/2022 2242 by Ronald Patten LPN  Outcome: Progressing     Problem: Chronic Conditions and Co-morbidities  Goal: Patient's chronic conditions and co-morbidity symptoms are monitored and maintained or improved  6/3/2022 1046 by Cristina Field LPN  Outcome: Progressing  Flowsheets (Taken 6/2/2022 2239 by Ronald Patten LPN)  Care Plan - Patient's Chronic Conditions and Co-Morbidity Symptoms are Monitored and Maintained or Improved:   Update acute care plan with appropriate goals if chronic or comorbid symptoms are exacerbated and prevent overall improvement and discharge   Collaborate with multidisciplinary team to address chronic and comorbid conditions and prevent exacerbation or deterioration   Monitor and assess patient's chronic conditions and comorbid symptoms for stability, deterioration, or improvement  6/2/2022 2242 by Ronald Patten LPN  Outcome: Progressing  Flowsheets  Taken 6/2/2022 2239 by Ronald Patten LPN  Care Plan - Patient's Chronic Conditions and Co-Morbidity Symptoms are Monitored and Maintained or Improved:   Update acute care plan with appropriate goals if chronic or comorbid symptoms are exacerbated and prevent overall improvement and discharge   Collaborate with multidisciplinary team to address chronic and comorbid conditions and prevent exacerbation or deterioration   Monitor and assess patient's chronic conditions and comorbid symptoms for stability, deterioration, or improvement  Taken 6/2/2022 2048 by Carolin Mills 36 - Patient's Chronic Conditions and Co-Morbidity Symptoms are Monitored and Maintained or Improved: Monitor and assess patient's chronic conditions and comorbid symptoms for stability, deterioration, or improvement     Problem: Respiratory - Adult  Goal: Clear lung sounds  6/3/2022 1046 by Seema Lea LPN  Outcome: Progressing  6/3/2022 0628 by Shamar Mejia RCP  Outcome: Progressing  Note: Txs to help improve lung aeration.      Problem: Musculoskeletal - Adult  Goal: Return ADL status to a safe level of function  6/3/2022 1046 by Seema Lea LPN  Outcome: Progressing  Flowsheets (Taken 6/2/2022 2242 by Robert Rudd LPN)  Return ADL Status to a Safe Level of Function:   Administer medication as ordered   Assess activities of daily living deficits and provide assistive devices as needed  6/2/2022 2242 by Robert Rudd LPN  Outcome: Progressing  Flowsheets  Taken 6/2/2022 2242 by Robert Rudd LPN  Return ADL Status to a Safe Level of Function:   Administer medication as ordered   Assess activities of daily living deficits and provide assistive devices as needed  Taken 6/2/2022 2048 by LIZZY Estrada  Return ADL Status to a Safe Level of Function:   Assess activities of daily living deficits and provide assistive devices as needed   Administer medication as ordered     Problem: Gastrointestinal - Adult  Goal: Maintains or returns to baseline bowel function  6/3/2022 1046 by Seema Lea LPN  Outcome: Progressing  Flowsheets (Taken 6/2/2022 2242 by Robert Rudd LPN)  Maintains or returns to baseline bowel function:   Administer IV fluids as ordered to ensure adequate hydration   Assess bowel function   Encourage mobilization and activity   Encourage oral fluids to ensure adequate hydration   Administer ordered medications as needed  6/2/2022 2242 by Robert Rudd LPN  Outcome: Shanel Alarcon (Taken 6/2/2022 2242)  Maintains or returns to baseline bowel function:   Administer IV fluids as ordered to ensure adequate hydration   Assess bowel function   Encourage mobilization and activity   Encourage oral fluids to ensure adequate hydration   Administer ordered medications as needed     Problem: Genitourinary - Adult  Goal: Absence of urinary retention  6/3/2022 1046 by Ramona Naidu LPN  Outcome: Progressing  Flowsheets (Taken 6/2/2022 2242 by Radha Corea LPN)  Absence of urinary retention:   Assess patients ability to void and empty bladder   Monitor intake/output and perform bladder scan as needed  6/2/2022 2242 by Radha Corea LPN  Outcome: Progressing  Flowsheets  Taken 6/2/2022 2242 by Radha Corea LPN  Absence of urinary retention:   Assess patients ability to void and empty bladder   Monitor intake/output and perform bladder scan as needed  Taken 6/2/2022 2048 by LIZZY Estrada  Absence of urinary retention: Assess patients ability to void and empty bladder     Problem: Hematologic - Adult  Goal: Maintains hematologic stability  6/3/2022 1046 by Ramona Naidu LPN  Outcome: Progressing  Flowsheets (Taken 6/2/2022 2242 by Radha Corae LPN)  Maintains hematologic stability: Assess for signs and symptoms of bleeding or hemorrhage  6/2/2022 2242 by Radha Corea LPN  Outcome: Progressing  Flowsheets  Taken 6/2/2022 2242 by Radha Corea LPN  Maintains hematologic stability: Assess for signs and symptoms of bleeding or hemorrhage  Taken 6/2/2022 2048 by LIZZY Estrada  Maintains hematologic stability: Assess for signs and symptoms of bleeding or hemorrhage      Care plan reviewed with patient. Patient verbalize understanding of the plan of care and contribute to goal setting.

## 2022-06-03 NOTE — PLAN OF CARE
Problem: Discharge Planning  Goal: Discharge to home or other facility with appropriate resources  Outcome: Progressing  Flowsheets (Taken 6/2/2022 2238)  Discharge to home or other facility with appropriate resources:   Identify barriers to discharge with patient and caregiver   Arrange for needed discharge resources and transportation as appropriate   Identify discharge learning needs (meds, wound care, etc)   Refer to discharge planning if patient needs post-hospital services based on physician order or complex needs related to functional status, cognitive ability or social support system  Note: Case management to assist with discharge planning. Patient expected to return to [facility] living at discharge. Problem: Pain  Goal: Verbalizes/displays adequate comfort level or baseline comfort level  Outcome: Progressing  Flowsheets (Taken 6/2/2022 2239)  Verbalizes/displays adequate comfort level or baseline comfort level:   Assess pain using appropriate pain scale   Encourage patient to monitor pain and request assistance   Administer analgesics based on type and severity of pain and evaluate response   Implement non-pharmacological measures as appropriate and evaluate response  Note: Pain well managed at this time. Tylenol 650 Q6 hours ordered PRN. Problem: Safety - Adult  Goal: Free from fall injury  Outcome: Progressing  Flowsheets  Taken 6/2/2022 2239  Free From Fall Injury: Instruct family/caregiver on patient safety  Taken 6/2/2022 2236  Free From Fall Injury: Instruct family/caregiver on patient safety  Note: Patient remains free of falls at this time. Appropriate fall prevention interventions in place at this time.       Problem: ABCDS Injury Assessment  Goal: Absence of physical injury  Outcome: Progressing  Flowsheets  Taken 6/2/2022 2239  Absence of Physical Injury: Implement safety measures based on patient assessment  Taken 6/2/2022 2236  Absence of Physical Injury: Implement safety measures based on patient assessment     Problem: Skin/Tissue Integrity  Goal: Absence of new skin breakdown  Description: 1. Monitor for areas of redness and/or skin breakdown  2. Assess vascular access sites hourly  3. Every 4-6 hours minimum:  Change oxygen saturation probe site  4. Every 4-6 hours:  If on nasal continuous positive airway pressure, respiratory therapy assess nares and determine need for appliance change or resting period.   Outcome: Progressing     Problem: Chronic Conditions and Co-morbidities  Goal: Patient's chronic conditions and co-morbidity symptoms are monitored and maintained or improved  Outcome: Progressing  Flowsheets (Taken 6/2/2022 2239)  Care Plan - Patient's Chronic Conditions and Co-Morbidity Symptoms are Monitored and Maintained or Improved:   Update acute care plan with appropriate goals if chronic or comorbid symptoms are exacerbated and prevent overall improvement and discharge   Collaborate with multidisciplinary team to address chronic and comorbid conditions and prevent exacerbation or deterioration   Monitor and assess patient's chronic conditions and comorbid symptoms for stability, deterioration, or improvement     Problem: Musculoskeletal - Adult  Goal: Return ADL status to a safe level of function  Outcome: Progressing  Flowsheets (Taken 6/2/2022 2242)  Return ADL Status to a Safe Level of Function:   Administer medication as ordered   Assess activities of daily living deficits and provide assistive devices as needed     Problem: Gastrointestinal - Adult  Goal: Maintains or returns to baseline bowel function  Outcome: Progressing  Flowsheets (Taken 6/2/2022 2242)  Maintains or returns to baseline bowel function:   Administer IV fluids as ordered to ensure adequate hydration   Assess bowel function   Encourage mobilization and activity   Encourage oral fluids to ensure adequate hydration   Administer ordered medications as needed     Problem: Genitourinary - Adult  Goal: Absence of urinary retention  Outcome: Progressing  Flowsheets (Taken 6/2/2022 2242)  Absence of urinary retention:   Assess patients ability to void and empty bladder   Monitor intake/output and perform bladder scan as needed     Problem: Hematologic - Adult  Goal: Maintains hematologic stability  Outcome: Progressing  Flowsheets (Taken 6/2/2022 2242)  Maintains hematologic stability: Assess for signs and symptoms of bleeding or hemorrhage     Care plan reviewed with patient. Patient verbalizes understanding of the plan of care and contribute to goal setting.

## 2022-06-03 NOTE — PROGRESS NOTES
Patients son here to take him back to assisted living. Went over discharge instructions/AVS with both patient and his son. Patient and family had no questions. Transport took patient to main lobby by wheelchair.

## 2022-06-03 NOTE — PLAN OF CARE
Problem: Respiratory - Adult  Goal: Clear lung sounds  Outcome: Progressing  Note: Txs to help improve lung aeration.

## 2022-06-03 NOTE — CARE COORDINATION
.6/3/22, 2:17 PM EDT    Patient goals/plan/ treatment preferences discussed by  and . Patient goals/plan/ treatment preferences reviewed with patient/ family. Patient/ family verbalize understanding of discharge plan and are in agreement with goal/plan/treatment preferences. Understanding was demonstrated using the teach back method. AVS provided by RN at time of discharge, which includes all necessary medical information pertaining to the patients current course of illness, treatment, post-discharge goals of care, and treatment preferences. Services At/After Discharge: Assisted Living       IMM Letter  IMM Letter given to Patient/Family/Significant other/Guardian/POA/by[de-identified] CM Brant Ahumada RN  IMM Letter date given[de-identified] 06/03/22  IMM Letter time given[de-identified] 1034     Spoke with patient and he is ready to go back to his apartment. Spoke with son and he stated that they are unable to transport patient and to set up [address] [facility] unable to transport until tomorrow, updated son and he stated that he will come get him if he is ready now. Spoke with RN and patient is ready. Son will pick him up. Spoke with Juan Cotton at [facility] and they can accept patient back and he does not need a covid test.  Patient has no concerns about returning to his apartment and being able to get around and RN stated that he has been ambulatory in his room. No further needs voiced.

## 2022-06-03 NOTE — DISCHARGE SUMMARY
Discharge Summary    Maria Del Rosario Prescott  :  1935  MRN:  [id number]    Admit date:  2022  Discharge date:      Admitting Physician:  Panchito Avila MD    Discharge Diagnoses:    1. Hyponatremia, chronic, symptomatic              -from SIADH              - hold SSRIs, was on Fluoxetine at home  2. Fall  3. Elevated troponin              -  4. HTN  5. Chronic a fib, PPM status  6. COPD  7. OA hips/ bone contusions s/p fall    Patient Active Problem List   Diagnosis    COPD (chronic obstructive pulmonary disease) with acute bronchitis ([facility])    Encephalopathy acute    Muscular deconditioning    Constipation, chronic    Dermatitis    [facility]    COPD with exacerbation ([facility])    Obesity (BMI 30-39. 9)    COPD with exacerbation ([facility]    Acute bronchitis    Hypertension    Atrial fibrillation ([facility])    SSS (sick sinus syndrome) ([facility])    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[facility])    Atrial fibrillation, chronic ([facility])    S/P cardiac pacemaker procedure    NSTEMI (non-ST elevated myocardial infarction) ([facility])    Respiratory failure with hypercapnia ([facility])    Pneumonia    Pulmonary edema cardiac cause ([facility])    Respiratory distress    COPD exacerbation ([facility])    Elevated troponin    CHF (congestive heart failure) ([facility])    Systolic CHF, acute on chronic ([facility])    Hyperglycemia    Hyponatremia    Abdominal wall abscess    Cellulitis and abscess of leg    Fall    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 Work up was consistent with SIADH, was on SSRI at home. Discontinued the SSRI and placed on fluid restriction. Na improved. He had elevated troponin with no CP, cardiology recommended stress test, patient refused-will see Dr Logan Bhatt as OP.     Discharge Medications:         Medication List      START taking these medications    atorvastatin 40 MG tablet  Commonly known as: LIPITOR  Take 1 tablet by mouth nightly     isosorbide mononitrate 30 MG extended release tablet  Commonly known as: IMDUR  Take 1 tablet by mouth daily        CHANGE how you take these medications    * albuterol (2.5 MG/3ML) 0.083% nebulizer solution  Commonly known as: PROVENTIL  Take 3 mLs by nebulization every 6 hours as needed for Wheezing  What changed: when to take this     * albuterol sulfate  (90 Base) MCG/ACT inhaler  Commonly known as: ProAir HFA  INHALE 2 PUFFS BY MOUTH EVERY 4 HOURS AS NEEDED  What changed: Another medication with the same name was changed. Make sure you understand how and when to take each. clotrimazole 1 % cream  Commonly known as: LOTRIMIN  APPLY TOPICALLY AS DIRECTED  What changed:   · how much to take  · how to take this  · when to take this     docusate sodium 100 mg capsule  Commonly known as: COLACE  TAKE ONE (1) CAPSULE BY MOUTH TWICE DAILY  What changed: See the new instructions. fluticasone 50 MCG/ACT nasal spray  Commonly known as: FLONASE  INSTILL 1 SPRAY INTRANASALLY DAILY  What changed: See the new instructions. traZODone 150 MG tablet  Commonly known as: DESYREL  TAKE (1) TABLET BY MOUTH NIGHTLY AS NEEDED FOR SLEEP  What changed: See the new instructions. * This list has 2 medication(s) that are the same as other medications prescribed for you. Read the directions carefully, and ask your doctor or other care provider to review them with you.             CONTINUE taking these medications    Acetaminophen 650 MG Tabs     Amitiza 24 MCG capsule  Generic drug: lubiprostone  TAKE ONE (1) CAPSULE BY MOUTH ONCE DAILY WITH FOOD     candesartan 16 MG tablet  Commonly known as: ATACAND     CERTAVITE SENIOR/ANTIOXIDANT PO     clobetasol propionate 0.05 % Gel gel     dilTIAZem 120 MG extended release capsule  Commonly known as: CARDIZEM CD  Take 1 capsule by mouth daily     Ensure Complete Liqd guaiFENesin 100 MG/5ML syrup  Commonly known as: ROBITUSSIN     LACTOBACILLUS PO     Lanoxin 125 MCG tablet  Generic drug: digoxin  Take 1 tablet by mouth daily     latanoprost 0.005 % ophthalmic solution  Commonly known as: XALATAN     Lift Chair Misc  by Does not apply route     magnesium hydroxide 2400 MG/10ML Susp  Commonly known as: MILK OF MAGNESIA CONCENTRATE     metoprolol tartrate 25 MG tablet  Commonly known as: LOPRESSOR  Take 2 tablets by mouth every 12 hours     pantoprazole 40 MG tablet  Commonly known as: PROTONIX     polyvinyl alcohol 1.4 % ophthalmic solution  Commonly known as: LIQUIFILM TEARS     sodium chloride 1 gram tablet  Take 1 tablet by mouth 3 times daily (with meals)     Spiriva HandiHaler 18 MCG inhalation capsule  Generic drug: tiotropium  INHALE THE CONTENTS OF ONE CAPSULE BY MOUTH ONCE DAILY AS DIRECTED     spironolactone 25 MG tablet  Commonly known as: ALDACTONE  TAKE 1 TABLET BY MOUTH ONCE DAILY     Symbicort 160-4.5 MCG/ACT Aero  Generic drug: budesonide-formoterol  INHALE 2 (TWO) PUFFS BY MOUTH TWICE DAILY     SYSTANE COMPLETE OP     triamcinolone 0.1 % cream  Commonly known as: KENALOG     vitamin B-12 100 MCG tablet  Commonly known as: CYANOCOBALAMIN     vitamin D 50 MCG (2000 UT) Caps capsule     Xarelto 20 MG Tabs tablet  Generic drug: rivaroxaban  TAKE ONE (1) TABLET BY MOUTH EACH EVENING WITH MEAL           Where to Get Your Medications      These medications were sent to [facility] [address] [place] [address] [phone]  [address] [place] [address]    Phone: [phone]   · atorvastatin 40 MG tablet  · isosorbide mononitrate 30 MG extended release tablet         Consults:  cardiology    Significant Diagnostic Studies: labs: BMP:          Recent Labs     06/01/22  0607 06/02/22  1731 06/03/22  0615   * 125* 131*   K 4.3 4.7 4.9   CL 95* 90* 96*   CO2 24 22* 22*   BUN 11 17 16   CREATININE 0.7 0.7 0.6 GLUCOSE 98 98 95      Hepatic:   No results for input(s): AST, ALT, ALB, BILITOT, ALKPHOS in the last 72 hours. Lipids:       Recent Labs     06/01/22  0607   CHOL 147   HDL 59      Magnesium:          Lab Results   Component Value Date     MG 1.9 11/13/2016      TSH:          Lab Results   Component Value Date     TSH 1.160 03/05/2022         Assessment and Plan:   2. Hyponatremia, chronic, symptomatic              - tests c/w SIADH              - hold SSRIs, was on Fluoxetine at home  4. Fall  5. Elevated troponin              - cont ASA, BB, statin              -ECHO              - cardiology planning stress test in am  8. HTN  9. Chronic a fib, PPM status  10. COPD  11. OA hips/ bone contusions s/p fall     Stop fluoxetine  Restrict free water to 1L per day  Cont meds as above. Dc home. See office 1 week withbmp. Treatments:   Fluid restriction, stop fluoxetine, bp control. Disposition:  Home.     Signed:  Nora Alejo MD  6/3/2022, 1:14 PM

## 2022-10-14 ENCOUNTER — HOSPITAL ENCOUNTER (EMERGENCY)
Age: 87
Discharge: HOME | End: 2022-10-14
Attending: EMERGENCY MEDICINE
Payer: MEDICARE

## 2022-10-14 VITALS
TEMPERATURE: 97.9 F | OXYGEN SATURATION: 99 % | DIASTOLIC BLOOD PRESSURE: 73 MMHG | HEIGHT: 73 IN | SYSTOLIC BLOOD PRESSURE: 130 MMHG | WEIGHT: 238 LBS | BODY MASS INDEX: 31.54 KG/M2 | RESPIRATION RATE: 18 BRPM | HEART RATE: 79 BPM

## 2022-10-14 DIAGNOSIS — L02.219 CELLULITIS AND ABSCESS OF TRUNK: Primary | ICD-10-CM

## 2022-10-14 DIAGNOSIS — L03.319 CELLULITIS AND ABSCESS OF TRUNK: Primary | ICD-10-CM

## 2022-10-14 PROCEDURE — 99283 EMERGENCY DEPT VISIT LOW MDM: CPT

## 2022-10-14 PROCEDURE — 6370000000 HC RX 637 (ALT 250 FOR IP): Performed by: EMERGENCY MEDICINE

## 2022-10-14 RX ORDER — ACETAMINOPHEN 500 MG
1000 TABLET ORAL ONCE
Status: COMPLETED | OUTPATIENT
Start: 2022-10-14 | End: 2022-10-14

## 2022-10-14 RX ORDER — SULFAMETHOXAZOLE AND TRIMETHOPRIM 800; 160 MG/1; MG/1
1 TABLET ORAL 2 TIMES DAILY
Qty: 20 TABLET | Refills: 0 | Status: SHIPPED | OUTPATIENT
Start: 2022-10-14 | End: 2022-10-24

## 2022-10-14 RX ORDER — CEPHALEXIN 500 MG/1
500 CAPSULE ORAL 4 TIMES DAILY
Qty: 40 CAPSULE | Refills: 0 | Status: SHIPPED | OUTPATIENT
Start: 2022-10-14 | End: 2022-10-24

## 2022-10-14 RX ADMIN — ACETAMINOPHEN 1000 MG: 500 TABLET ORAL at 13:10

## 2022-10-14 ASSESSMENT — ENCOUNTER SYMPTOMS
COUGH: 0
NAUSEA: 0
CHEST TIGHTNESS: 0
EYE PAIN: 0
EYE REDNESS: 0
ABDOMINAL PAIN: 0
CONSTIPATION: 0
ABDOMINAL DISTENTION: 0
CHOKING: 0
BLOOD IN STOOL: 0
BACK PAIN: 0
TROUBLE SWALLOWING: 0
VOMITING: 0
SORE THROAT: 0
EYE ITCHING: 0
STRIDOR: 0
WHEEZING: 0
SHORTNESS OF BREATH: 0
SINUS PAIN: 0
DIARRHEA: 0
PHOTOPHOBIA: 0

## 2022-10-14 ASSESSMENT — PAIN DESCRIPTION - LOCATION: LOCATION: SHOULDER

## 2022-10-14 ASSESSMENT — PAIN - FUNCTIONAL ASSESSMENT: PAIN_FUNCTIONAL_ASSESSMENT: 0-10

## 2022-10-14 ASSESSMENT — PAIN SCALES - GENERAL: PAINLEVEL_OUTOF10: 7

## 2022-10-14 ASSESSMENT — PAIN DESCRIPTION - ORIENTATION: ORIENTATION: RIGHT

## 2022-10-14 ASSESSMENT — PAIN DESCRIPTION - DESCRIPTORS: DESCRIPTORS: ACHING

## 2022-10-14 NOTE — ED NOTES
RN arranged transportation to nursing home. ETA 1400. RN updated patient on POC.       Loraine Mackey RN  10/14/22 1440

## 2022-10-14 NOTE — ED PROVIDER NOTES
[facility] ENCOUNTER          Pt Name: Kamryn Prescott  MRN: [id number]  [place] 1935  Date of evaluation: 10/14/2022  Treating Resident Physician: Tata Echols MD  Supervising Physician: Giovanna Walton MD    History obtained from chart review and the patient. CHIEF COMPLAINT       Chief Complaint   Patient presents with    Abscess     Right shoulder           HISTORY OF PRESENT ILLNESS    HPI  Valentino [place] is a 82 y.o. male who presents to the emergency department for evaluation of an abscess on his right back. Patient is coming for an assisted living facility and stated that he has only noticed this here for the past 4 to 5 days. He stated he cannot see his back so once it started hurting his when he suspected something was wrong. Today he stated that it ruptured and it was oozing out pus so he came to the ER. Patient is denying any nausea, vomiting, shortness of breath, fever, chest pain. The patient has no other acute complaints at this time. REVIEW OF SYSTEMS   Review of Systems   Constitutional:  Negative for appetite change, chills, diaphoresis, fatigue, fever and unexpected weight change. HENT:  Negative for dental problem, drooling, ear discharge, ear pain, hearing loss, mouth sores, sinus pain, sneezing, sore throat and trouble swallowing. Eyes:  Negative for photophobia, pain, redness and itching. Respiratory:  Negative for cough, choking, chest tightness, shortness of breath, wheezing and stridor. Cardiovascular:  Negative for chest pain, palpitations and leg swelling. Gastrointestinal:  Negative for abdominal distention, abdominal pain, blood in stool, constipation, diarrhea, nausea and vomiting. Endocrine: Negative for polydipsia, polyphagia and polyuria. Genitourinary:  Negative for difficulty urinating, dysuria, flank pain, genital sores, penile discharge, testicular pain and urgency.    Musculoskeletal: Negative for back pain, myalgias and neck pain. Skin:  Positive for wound (R back). Negative for pallor and rash. Neurological:  Negative for dizziness, tremors, seizures, syncope, numbness and headaches. Psychiatric/Behavioral:  Negative for confusion, dysphoric mood, self-injury and suicidal ideas. The patient is not nervous/anxious and is not hyperactive. PAST MEDICAL AND SURGICAL HISTORY     Past Medical History:   Diagnosis Date    Arthritis     Asthma     Atrial fibrillation (HCC)     CHF (congestive heart failure) (HCC)     COPD (chronic obstructive pulmonary disease) ([facility]     Encephalopathy acute 11/20/2013    Hyperlipidemia     Hypertension     Muscular deconditioning 12/23/2013    Pneumonia      Past Surgical History:   Procedure Laterality Date    ABDOMEN SURGERY      ABDOMEN SURGERY N/A 7/11/2021    LEFT ABDOMEN WALL WOUND DEBRIDEMENT AND REPAIR performed by Jeaneth Olson MD at [facility]    EK 12-LEAD  8/5/2015         FRACTURE SURGERY  unsure    HERNIA REPAIR  2013    [facility]    OTHER SURGICAL HISTORY  10-30-13    abdominal exploration, closure of evisceration with retention sutures-Dr. Vera Stewart     OTHER SURGICAL HISTORY  10-25-13    Placement of gastrostomy tube-Dr. Guanakito Goodman   No current facility-administered medications for this encounter.     Current Outpatient Medications:     cephALEXin (KEFLEX) 500 MG capsule, Take 1 capsule by mouth 4 times daily for 10 days, Disp: 40 capsule, Rfl: 0    sulfamethoxazole-trimethoprim (BACTRIM DS) 800-160 MG per tablet, Take 1 tablet by mouth 2 times daily for 10 days, Disp: 20 tablet, Rfl: 0    atorvastatin (LIPITOR) 40 MG tablet, Take 1 tablet by mouth nightly, Disp: 30 tablet, Rfl: 3    isosorbide mononitrate (IMDUR) 30 MG extended release tablet, Take 1 tablet by mouth daily, Disp: 30 tablet, Rfl: 3    sodium chloride 1 g tablet, Take 1 tablet by mouth 3 times daily (with meals), Disp: 90 tablet, Rfl: 0 clobetasol propionate 0.05 % GEL gel, Apply 1 applicator topically daily, Disp: , Rfl:     LACTOBACILLUS PO, Take 1 tablet by mouth 2 times daily, Disp: , Rfl:     Propylene Glycol (SYSTANE COMPLETE OP), Apply 1 drop to eye 4 times daily, Disp: , Rfl:     metoprolol tartrate (LOPRESSOR) 25 MG tablet, Take 2 tablets by mouth every 12 hours, Disp: 60 tablet, Rfl: 3    candesartan (ATACAND) 16 MG tablet, Take 16 mg by mouth 2 times daily, Disp: , Rfl:     vitamin B-12 (CYANOCOBALAMIN) 100 MCG tablet, Take 1,000 mcg by mouth daily, Disp: , Rfl:     triamcinolone (KENALOG) 0.1 % cream, Apply 1 each topically every 12 hours as needed (rash) Apply topically 2 times daily. , Disp: , Rfl:     Cholecalciferol (VITAMIN D) 50 MCG (2000 UT) CAPS capsule, Take 2,000 Units by mouth daily, Disp: , Rfl:     pantoprazole (PROTONIX) 40 MG tablet, Take 40 mg by mouth daily, Disp: , Rfl:     SPIRIVA HANDIHALER 18 MCG inhalation capsule, INHALE THE CONTENTS OF ONE CAPSULE BY MOUTH ONCE DAILY AS DIRECTED, Disp: 30 capsule, Rfl: 5    docusate sodium (COLACE) 100 MG capsule, TAKE ONE (1) CAPSULE BY MOUTH TWICE DAILY (Patient taking differently: Take 100 mg by mouth 2 times daily ), Disp: 60 capsule, Rfl: 5    XARELTO 20 MG TABS tablet, TAKE ONE (1) TABLET BY MOUTH EACH EVENING WITH MEAL, Disp: 30 tablet, Rfl: 5    albuterol sulfate HFA (PROAIR HFA) 108 (90 BASE) MCG/ACT inhaler, INHALE 2 PUFFS BY MOUTH EVERY 4 HOURS AS NEEDED, Disp: 1 Inhaler, Rfl: 5    LANOXIN 125 MCG tablet, Take 1 tablet by mouth daily, Disp: 30 tablet, Rfl: 5    traZODone (DESYREL) 150 MG tablet, TAKE (1) TABLET BY MOUTH NIGHTLY AS NEEDED FOR SLEEP (Patient taking differently: Take 150 mg by mouth nightly Indications: Depression ), Disp: 30 tablet, Rfl: 5    fluticasone (FLONASE) 50 MCG/ACT nasal spray, INSTILL 1 SPRAY INTRANASALLY DAILY (Patient taking differently: 1 spray daily ), Disp: 16 g, Rfl: 11    clotrimazole (LOTRIMIN) 1 % cream, APPLY TOPICALLY AS DIRECTED (Patient taking differently: Apply 1 applicator topically 2 times daily APPLY TOPICALLY AS DIRECTED), Disp: 45 g, Rfl: 5    spironolactone (ALDACTONE) 25 MG tablet, TAKE 1 TABLET BY MOUTH ONCE DAILY, Disp: 30 tablet, Rfl: 5    SYMBICORT 160-4.5 MCG/ACT AERO, INHALE 2 (TWO) PUFFS BY MOUTH TWICE DAILY, Disp: 10.2 g, Rfl: 5    diltiazem (CARDIZEM CD) 120 MG ER capsule, Take 1 capsule by mouth daily, Disp: 90 capsule, Rfl: 3    Lift Chair MISC, by Does not apply route, Disp: 1 each, Rfl: 0    AMITIZA 24 MCG capsule, TAKE ONE (1) CAPSULE BY MOUTH ONCE DAILY WITH FOOD, Disp: 30 capsule, Rfl: 11    Multiple Vitamins-Minerals (CERTAVITE SENIOR/ANTIOXIDANT PO), Take by mouth, Disp: , Rfl:     polyvinyl alcohol (LIQUIFILM TEARS) 1.4 % ophthalmic solution, 1 drop as needed, Disp: , Rfl:     latanoprost (XALATAN) 0.005 % ophthalmic solution, 1 drop nightly, Disp: , Rfl:     magnesium hydroxide (MILK OF MAGNESIA CONCENTRATE) 2400 MG/10ML SUSP, Take 2,400 mg by mouth once as needed , Disp: , Rfl:     acetaminophen 650 MG TABS, Take 650 mg by mouth every 4 hours as needed, Disp: 120 tablet, Rfl: 3    albuterol (PROVENTIL) (2.5 MG/3ML) 0.083% nebulizer solution, Take 3 mLs by nebulization every 6 hours as needed for Wheezing (Patient taking differently: Take 2.5 mg by nebulization 2 times daily ), Disp: 1 Package, Rfl: 3    guaiFENesin (ROBITUSSIN) 100 MG/5ML syrup, Take 200 mg by mouth 4 times daily as needed for Cough or Congestion , Disp: , Rfl:     Nutritional Supplements (ENSURE COMPLETE) LIQD, Take 1 Can by mouth daily , Disp: , Rfl:       SOCIAL HISTORY     Social History     Social History Narrative    Not on file     Social History     Tobacco Use    Smoking status: Former     Packs/day: 2.00     Years: 30.00     Pack years: 60.00     Types: Cigarettes     Start date: 12/17/1978    Smokeless tobacco: Never   Vaping Use    Vaping Use: Never used   Substance Use Topics    Alcohol use: No     Comment: 1 time a week    Drug use: No         ALLERGIES     Allergies   Allergen Reactions    Metolazone      Causes hyponatremia (with high urine sodium)    Thiazide-Type Diuretics      Causes hyponatremia (with high urine sodium)         FAMILY HISTORY     Family History   Problem Relation Age of Onset    Arthritis Father     Asthma Father     Heart Disease Father     High Blood Pressure Father     Cancer Mother         ovarian cancer    Diabetes Mother     Cancer Sister     Heart Disease Sister     Diabetes Brother     Cancer Sister     Heart Disease Sister     Heart Disease Brother     Diabetes Brother     Heart Disease Brother          PREVIOUS RECORDS   Previous records reviewed:       PHYSICAL EXAM     ED Triage Vitals [10/14/22 1119]   BP Temp Temp Source Heart Rate Resp SpO2 Height Weight   112/61 97.9 °F (36.6 °C) Oral 85 18 99 % 6' 1\" (1.854 m) 238 lb (108 kg)     Initial vital signs and nursing assessment reviewed and normal. Body mass index is 31.4 kg/m². Pulsoximetry is normal per my interpretation. Additional Vital Signs:  Vitals:    10/14/22 1311   BP: 130/73   Pulse: 79   Resp: 18   Temp:    SpO2: 99%       Physical Exam  Vitals and nursing note reviewed. Constitutional:       General: He is not in acute distress. Appearance: Normal appearance. He is not ill-appearing, toxic-appearing or diaphoretic. HENT:      Head: Normocephalic and atraumatic. Right Ear: Tympanic membrane, ear canal and external ear normal.      Left Ear: Tympanic membrane, ear canal and external ear normal.      Nose: Nose normal. No congestion or rhinorrhea. Mouth/Throat:      Mouth: Mucous membranes are moist.      Pharynx: Oropharynx is clear. No oropharyngeal exudate or posterior oropharyngeal erythema. Eyes:      General: No scleral icterus. Right eye: No discharge. Left eye: No discharge. Extraocular Movements: Extraocular movements intact.       Conjunctiva/sclera: Conjunctivae normal.      Pupils: Pupils are equal, round, and reactive to light. Cardiovascular:      Rate and Rhythm: Normal rate and regular rhythm. Pulses: Normal pulses. Heart sounds: Normal heart sounds. No murmur heard. No gallop. Pulmonary:      Effort: Pulmonary effort is normal. No respiratory distress. Breath sounds: Normal breath sounds. No stridor. No wheezing, rhonchi or rales. Chest:      Chest wall: No tenderness. Abdominal:      General: Bowel sounds are normal. There is no distension. Palpations: Abdomen is soft. Tenderness: There is no abdominal tenderness. There is no right CVA tenderness, left CVA tenderness, guarding or rebound. Musculoskeletal:         General: No swelling, tenderness, deformity or signs of injury. Cervical back: Neck supple. No rigidity or tenderness. Right lower leg: No edema. Left lower leg: No edema. Lymphadenopathy:      Cervical: No cervical adenopathy. Skin:     General: Skin is warm and dry. Coloration: Skin is not jaundiced or pale. Findings: Bruising and lesion (R posterior back) present. No erythema or rash. Neurological:      General: No focal deficit present. Mental Status: He is alert and oriented to person, place, and time. Mental status is at baseline. Psychiatric:         Mood and Affect: Mood normal.         Behavior: Behavior normal.         Thought Content: Thought content normal.         Judgment: Judgment normal.         MEDICAL DECISION MAKING   Initial Assessment:   82-year-old male come to the ED for evaluation of abscess/cellulitis. On patient's right back posterior to his shoulder there is ruptured abscess with obvious cellulitis to surrounding tissue. Patient is denying any systemic symptoms and is only complaining of pain at the site of the ruptured abscess. He has history of MRSA infection and concern for MRSA on this abscess as well patient was given Bactrim and Keflex to cover for most likely microbes.   Strict return cautions discussed with patient and placed in discharge paperwork for assisted living facility. Patient discharged with recommended outpatient follow-up. Plan:   Acetaminophen p.o. for pain management  P.o. antibiotics Keflex and Bactrim for 10 days  DC to assisted living facility with strict return precautions. ED RESULTS   Laboratory results:  Labs Reviewed - No data to display    Radiologic studies results:  No orders to display       ED Medications administered this visit:   Medications   acetaminophen (TYLENOL) tablet 1,000 mg (1,000 mg Oral Given 10/14/22 1310)         ED COURSE      Strict return precautions and follow up instructions were discussed with the patient prior to discharge, with which the patient agrees. MEDICATION CHANGES     New Prescriptions    CEPHALEXIN (KEFLEX) 500 MG CAPSULE    Take 1 capsule by mouth 4 times daily for 10 days    SULFAMETHOXAZOLE-TRIMETHOPRIM (BACTRIM DS) 800-160 MG PER TABLET    Take 1 tablet by mouth 2 times daily for 10 days         FINAL DISPOSITION     Final diagnoses:   Cellulitis and abscess of trunk     Condition: condition: good  Dispo: Discharge to nursing home      This transcription was electronically signed. Parts of this transcriptions may have been dictated by use of voice recognition software and electronically transcribed, and parts may have been transcribed with the assistance of an ED scribe. The transcription may contain errors not detected in proofreading. Please refer to my supervising physician's documentation if my documentation differs.     Electronically Signed: Addie Ordoñez MD, 10/14/22, 1:29 PM        Addie Ordoñez MD  Resident  10/14/22 3038

## 2022-10-14 NOTE — ED NOTES
Pt resting quietly in room no needs expressed. Side rails up x2 with call light in reach. Will continue to monitor.        Merilynn Snellen, RN  10/14/22 3083

## 2022-10-14 NOTE — DISCHARGE INSTRUCTIONS
Please complete your antibiotic regimen. If the area of redness and pain continues to spread despite treatment, and if you develop fever please return to the ER for evaluation. I have given you a prescription for 2 different antibiotics please complete these regimens. You may take Tylenol every 6 hours as needed for pain. Please follow-up with your primary care provider after 3 to 4 days to evaluate treatment.

## 2022-10-14 NOTE — ED PROVIDER NOTES
[id number] [facility]  EMERGENCY MEDICINE ATTENDING ATTESTATION      Evaluation of Ronit Ferro Music. Case discussed and care plan developed with resident physician. I agree with the resident physician documentation and plan as documented by him, except if my documentation differs. Patient seen, interviewed and examined by me. I reviewed the medical, surgical, family and social history, medications and allergies. I have reviewed the nursing documentation. I have reviewed the patient's vital signs and are normal per my interpretation. Body mass index is 31.4 kg/m². Pulsoxymetry is normal per my interpretation. Brief H&P   Patient c/o tender area of erythema and drainage to the posterior right shoulder. Patient noticed it today because of pain but states he may have had it for several days. Physical exam is notable for well appearing, significant area of induration with central draining, no fluctuation, on the posterior right shoulder. Medical Decision Making   MDM:   Drained abscess with surrounding cellulitis  Plan:   Start antibiotics  Start NSAIDs  PCP follow-up    Please see the resident physician completed note for final disposition except as documented on this attestation. I have reviewed and interpreted all available lab, radiology and ekg results available at the moment. Diagnosis, treatment and disposition plans were discussed and agreed upon by patient. This transcription was electronically signed. It was dictated by use of voice recognition software and electronically transcribed. The transcription may contain errors not detected in proofreading.      I performed direct supervision and was present for the critical portion following procedures: None  Critical care time on this case: None    Electronically signed by Stephanie Clark MD on 10/14/22 at 12:24 PM EDT        Stephanie Clark MD  10/14/22 1388

## 2022-10-14 NOTE — ED TRIAGE NOTES
Pt presents to the ED via lobby with c/o an abscess on his right shoulder. Pt states he noticed the swelling and abscess two days ago. Pt states it started draining last night.  Upon arrival pt has significant redness and drainage

## 2022-10-18 ENCOUNTER — PROCEDURE VISIT (OUTPATIENT)
Dept: CARDIOLOGY CLINIC | Age: 87
End: 2022-10-18
Payer: MEDICARE

## 2022-10-18 DIAGNOSIS — Z95.0 S/P CARDIAC PACEMAKER PROCEDURE: Primary | ICD-10-CM

## 2022-10-18 PROCEDURE — 93279 PRGRMG DEV EVAL PM/LDLS PM: CPT | Performed by: INTERNAL MEDICINE

## 2022-10-18 NOTE — PROGRESS NOTES
[employer] single pacer in office     2.5 years on device   82.2% paced   R waves 4-5.6  Threshold 0.75 @ 0.4  Imped 613

## 2023-01-07 NOTE — ED NOTES
ED to inpatient nurses report    Chief Complaint   Patient presents with    Leg Swelling      Present to ED from home  LOC: alert and orientated to name, place, date  Vital signs   Vitals:    03/04/22 2315 03/04/22 2330 03/04/22 2345 03/05/22 0000   BP:  (!) 117/100  (!) 141/56   Pulse:  76  74   Resp:       Temp:       TempSrc:       SpO2: 100% 100% 98% 96%   Weight:          Oxygen Baseline RA    Current needs required none Bipap/Cpap No  LDAs:   Peripheral IV 07/07/21 Right Forearm (Active)     Mobility: Requires assistance * 1  Pending ED orders: none  Present condition: stable      Electronically signed by Jennefer Phalen, RN on 3/5/2022 at 12:46 AM     Jennefer Phalen, RN  03/05/22 7217
Patient transferred to [facility] room 022 nurse informed.       Tony Cabrales  03/05/22 0054
Pt assisted with urinal, repostioned in bed to a position of comfort, pt denies any needs.      Jennefer Phalen, RN  03/05/22 0194
Pt remains stable, resting in bed, denies any pain. Pt denies any needs, family at bedside.      Genevieve Hines RN  03/05/22 0956
Pt remains table, resting in bed, denies any pain at this time, family at bedside.  Pt waiting for [address] Lock, LIZZY  03/04/22 2043
Stable

## 2023-01-13 ENCOUNTER — TELEPHONE (OUTPATIENT)
Dept: CARDIOLOGY CLINIC | Age: 88
End: 2023-01-13

## 2023-01-13 NOTE — LETTER
[address] [place] [address]  Phone: [phone]  Fax: [phone] [facility], [place]        January 13, 2023    [address] [place] [facility]      Dear Kar Haque:    Osvaldo Ruiz We have been unable to contact you by phone. Our office did not receive your Carelink transmission which was scheduled for January 5, 2023     Please check the connections and send a manual download ASAP. If you need help sending a download, please call [employer] at [phone]. Our office is not responsible for missed transmissions. Please call our office at [phone] if you have questions for the pacemaker/ICD clinic     Thank You for your assistance!     [facility] Pacemaker/ICD clinic

## 2023-01-17 ENCOUNTER — TELEPHONE (OUTPATIENT)
Dept: CARDIOLOGY CLINIC | Age: 88
End: 2023-01-17

## 2023-01-17 NOTE — LETTER
[address] [place] [address]  Phone: [phone]  Fax: [phone] S[place], MD        January 17, 2023    [address] [place] [facility]      Dear Britton Tan:    . Dorie Maciel We have been unable to contact you by phone. Our office did not receive your Carelink transmission which was scheduled for January 5, 2023     Please check the connections and send a manual download ASAP. If you need help sending a download, please call [employer] at [phone]. Our office is not responsible for missed transmissions. Please call our office at [phone] if you have questions for the pacemaker/ICD clinic     Thank You for your assistance!     [facility] Pacemaker/ICD clinic

## 2023-02-22 ENCOUNTER — TELEPHONE (OUTPATIENT)
Dept: CARDIOLOGY CLINIC | Age: 88
End: 2023-02-22

## 2023-03-29 ENCOUNTER — TELEPHONE (OUTPATIENT)
Dept: CARDIOLOGY CLINIC | Age: 88
End: 2023-03-29

## 2023-03-29 NOTE — LETTER
March 29, 2023       [facility] [address]      Dear Vanessa Kong:    . Janelle Adhikari We have been unable to contact you by phone. Our office did not receive your Carelink transmission which was scheduled for January 5, 2023. Please check the connections and send a manual download ASAP. If you need help sending a download, please call [employer] at [phone]. Our office is not responsible for missed transmissions. Please call our office at [phone] if you have questions for the pacemaker/ICD clinic     Thank You for your assistance!     [facility] Pacemaker/ICD clinic       Miley Coleman MD

## 2023-04-26 ENCOUNTER — TELEPHONE (OUTPATIENT)
Dept: CARDIOLOGY CLINIC | Age: 88
End: 2023-04-26

## 2023-04-26 NOTE — TELEPHONE ENCOUNTER
[facility]. Pt was in another nursing home and the carelink monitor did not make it to [facility] . Told nurse in nursing home that we will just take this pt out of carelink website and [facility]. Dasha Green said this pt told her that he used to have a  monitor but he  did not know how to use it and he does not know what happened to it.    I called and spoke to Melly Malone with dr Nena Goodman an informed her that this pt was taken out of carelink and this pt will be seen in dr Jessica De La [facility]re device clinic only

## 2023-06-22 ENCOUNTER — NURSE ONLY (OUTPATIENT)
Dept: CARDIOLOGY CLINIC | Age: 88
End: 2023-06-22
Payer: MEDICARE

## 2023-06-22 DIAGNOSIS — Z95.0 S/P CARDIAC PACEMAKER PROCEDURE: Primary | ICD-10-CM

## 2023-06-22 PROCEDURE — 93279 PRGRMG DEV EVAL PM/LDLS PM: CPT | Performed by: INTERNAL MEDICINE

## 2023-12-19 ENCOUNTER — NURSE ONLY (OUTPATIENT)
Dept: CARDIOLOGY CLINIC | Age: 88
End: 2023-12-19

## 2023-12-19 DIAGNOSIS — Z95.0 S/P CARDIAC PACEMAKER PROCEDURE: Primary | ICD-10-CM

## 2024-01-05 NOTE — PROGRESS NOTES
Medtr[employer] single pacer in office/anay     6-32 months avg 19 months on device  appt scheduled with Keturah Turner    529 imped  94% paced   R waves 4-5.6  Threshold 0.75 @ 0.4

## 2024-06-03 ENCOUNTER — NURSE ONLY (OUTPATIENT)
Dept: CARDIOLOGY CLINIC | Age: 89
End: 2024-06-03
Payer: MEDICAID

## 2024-06-03 DIAGNOSIS — Z95.0 S/P CARDIAC PACEMAKER PROCEDURE: Primary | ICD-10-CM

## 2024-06-03 PROCEDURE — 93288 INTERROG EVL PM/LDLS PM IP: CPT | Performed by: INTERNAL MEDICINE

## 2024-06-03 NOTE — PROGRESS NOTES
Med[employer] single pacer in office     <1-25 months on device avg 13  Will check end of July  Not dependent   Thresold 0.75 @ 0.4  R fwaves 4-5.6  96.7% paced

## 2024-06-12 NOTE — PROGRESS NOTES
INTERNAL MEDICINE Progress Note  3/7/2022 6:56 PM  Subjective:   Admit Date: 3/4/2022  PCP: Edison Son MD  Interval History:     Improved left leg pain and swelling ++  no fever, no chills    Objective:   Vitals: BP (!) 129/58   Pulse 66   Temp 97.6 °F (36.4 °C) (Oral)   Resp 16   Ht 6' 1\" (1.854 m)   Wt 231 lb 1.6 oz (104.8 kg)   SpO2 98%   BMI 30.49 kg/m²   General appearance: alert and cooperative with exam  HEENT:  atraumatic  Neck:  no JVD  Lungs: clear to auscultation bilaterally  Heart: irregularly irregular rhythm and S1, S2 normal  Abdomen: soft, non-tender; bowel sounds normal; no masses,  no organomegaly  Extremities: improved erythema left foot / lower leg  Neurologic:  Alert, oriented, thought content appropriate      Medications:   Scheduled Meds:   fluticasone  1 spray Each Nostril BID    sodium chloride  1 g Oral TID WC    vancomycin  1,500 mg IntraVENous Q24H    vancomycin (VANCOCIN) intermittent dosing (placeholder)   Other RX Placeholder    ipratropium-albuterol  1 ampule Inhalation BID    lubiprostone  24 mcg Oral Daily with breakfast    Vitamin D  2,000 Units Oral Daily    FLUoxetine  10 mg Oral Daily    digoxin  125 mcg Oral Daily    latanoprost  1 drop Ophthalmic Nightly    pantoprazole  40 mg Oral Daily    piperacillin-tazobactam  3,375 mg IntraVENous Q8H    dilTIAZem  120 mg Oral Daily    metoprolol tartrate  25 mg Oral BID    insulin lispro  0-6 Units SubCUTAneous TID WC    insulin lispro  0-3 Units SubCUTAneous Nightly    rivaroxaban  20 mg Oral Daily     Continuous Infusions:    Lab Results:   CBC:   Recent Labs     03/05/22  0550 03/06/22  0440 03/07/22  0623   WBC 16.3* 13.2* 14.2*   HGB 12.2* 12.4* 12.5*    291 290     BMP:    Recent Labs     03/05/22  0550 03/06/22  0440 03/07/22  0623   * 126* 127*   K 4.2 4.1 4.3   CL 93* 89* 89*   CO2 22* 23 27   BUN 11 15 16   CREATININE 0.6 0.8 0.7   GLUCOSE 106 98 95     Hepatic:   Recent Labs 03/04/22 2015   AST 22   ALT 23   BILITOT 0.3   ALKPHOS 65   Magnesium:    Lab Results   Component Value Date    MG 1.9 11/13/2016     TSH:    Lab Results   Component Value Date    TSH 1.160 03/05/2022 03/05/22 1417    Specimen Source: Blood     Blood Culture, Routine No growth-preliminary  Abnormal     Organism gram positive bacilli Abnormal         Ref. Range 3/5/2022 03:20   Osmolality, Ur Latest Ref Range: 250 - 750 mosmol/kg 260   Sodium, Ur Latest Units: meq/l 75       Assessment and Plan:   1. Cellulitis left leg  2. Hyponatremia, chronic, related to fluoxetine, dc fluoxetine  3. Chronic a fib  4. Chronic CHFpEF  5. COPD     Cont zosyn  Will switch to po antibiotic at [address] [facility] home meds  Restrict free water  Am labs  prob dc home in am if remains stable.     Racquel Buitrago MD, MD Female

## 2024-07-29 ENCOUNTER — NURSE ONLY (OUTPATIENT)
Dept: CARDIOLOGY CLINIC | Age: 89
End: 2024-07-29
Payer: MEDICARE

## 2024-07-29 DIAGNOSIS — Z95.0 PACEMAKER: Primary | ICD-10-CM

## 2024-07-29 PROCEDURE — 93288 INTERROG EVL PM/LDLS PM IP: CPT | Performed by: INTERNAL MEDICINE

## 2024-07-29 NOTE — PROGRESS NOTES
Medtronic single pacer in office     <1-24 months avg 12  Imped 502  98.7% paced   Underlying AF 44-62  R waves 4-5.6  Threshold 0.75 @ 0.4

## 2024-09-23 ENCOUNTER — NURSE ONLY (OUTPATIENT)
Dept: CARDIOLOGY CLINIC | Age: 89
End: 2024-09-23
Payer: MEDICARE

## 2024-09-23 DIAGNOSIS — Z95.0 PACEMAKER: Primary | ICD-10-CM

## 2024-09-23 PROCEDURE — 93279 PRGRMG DEV EVAL PM/LDLS PM: CPT | Performed by: NUCLEAR MEDICINE

## 2024-09-27 ENCOUNTER — HOSPITAL ENCOUNTER (INPATIENT)
Age: 89
LOS: 3 days | Discharge: NURSING, REHAB OR LONG-TERM CARE | DRG: 871 | End: 2024-09-30
Payer: MEDICARE

## 2024-09-27 ENCOUNTER — APPOINTMENT (OUTPATIENT)
Dept: GENERAL RADIOLOGY | Age: 89
DRG: 871 | End: 2024-09-27
Payer: MEDICARE

## 2024-09-27 DIAGNOSIS — R06.02 SHORTNESS OF BREATH: ICD-10-CM

## 2024-09-27 DIAGNOSIS — J18.9 PNEUMONIA OF LEFT LUNG DUE TO INFECTIOUS ORGANISM, UNSPECIFIED PART OF LUNG: Primary | ICD-10-CM

## 2024-09-27 DIAGNOSIS — R79.89 ELEVATED TROPONIN: ICD-10-CM

## 2024-09-27 DIAGNOSIS — N17.9 ACUTE RENAL FAILURE, UNSPECIFIED ACUTE RENAL FAILURE TYPE (HCC): ICD-10-CM

## 2024-09-27 DIAGNOSIS — A41.9 SEPTICEMIA (HCC): ICD-10-CM

## 2024-09-27 LAB
ANION GAP SERPL CALC-SCNC: 10 MEQ/L (ref 8–16)
BASOPHILS ABSOLUTE: 0 THOU/MM3 (ref 0–0.1)
BASOPHILS NFR BLD AUTO: 0.1 %
BUN SERPL-MCNC: 25 MG/DL (ref 7–22)
CA-I BLD ISE-SCNC: 1.11 MMOL/L (ref 1.12–1.32)
CALCIUM SERPL-MCNC: 8.2 MG/DL (ref 8.5–10.5)
CHLORIDE SERPL-SCNC: 93 MEQ/L (ref 98–111)
CO2 SERPL-SCNC: 20 MEQ/L (ref 23–33)
CREAT SERPL-MCNC: 1.4 MG/DL (ref 0.4–1.2)
DEPRECATED RDW RBC AUTO: 49.7 FL (ref 35–45)
EKG ATRIAL RATE: 357 BPM
EKG Q-T INTERVAL: 398 MS
EKG QRS DURATION: 148 MS
EKG QTC CALCULATION (BAZETT): 456 MS
EKG R AXIS: 66 DEGREES
EKG T AXIS: -136 DEGREES
EKG VENTRICULAR RATE: 79 BPM
EOSINOPHIL NFR BLD AUTO: 2.1 %
EOSINOPHILS ABSOLUTE: 0.3 THOU/MM3 (ref 0–0.4)
ERYTHROCYTE [DISTWIDTH] IN BLOOD BY AUTOMATED COUNT: 15 % (ref 11.5–14.5)
FLUAV RNA RESP QL NAA+PROBE: NOT DETECTED
FLUBV RNA RESP QL NAA+PROBE: NOT DETECTED
GFR SERPL CREATININE-BSD FRML MDRD: 49 ML/MIN/1.73M2
GLUCOSE SERPL-MCNC: 140 MG/DL (ref 70–108)
HCT VFR BLD AUTO: 25.8 % (ref 42–52)
HGB BLD-MCNC: 8.7 GM/DL (ref 14–18)
IMM GRANULOCYTES # BLD AUTO: 0.16 THOU/MM3 (ref 0–0.07)
IMM GRANULOCYTES NFR BLD AUTO: 1 %
LACTIC ACID, SEPSIS: 2.1 MMOL/L (ref 0.5–1.9)
LACTIC ACID, SEPSIS: 3 MMOL/L (ref 0.5–1.9)
LACTIC ACID, SEPSIS: 3.4 MMOL/L (ref 0.5–1.9)
LYMPHOCYTES ABSOLUTE: 0.3 THOU/MM3 (ref 1–4.8)
LYMPHOCYTES NFR BLD AUTO: 1.7 %
MCH RBC QN AUTO: 30.9 PG (ref 26–33)
MCHC RBC AUTO-ENTMCNC: 33.7 GM/DL (ref 32.2–35.5)
MCV RBC AUTO: 91.5 FL (ref 80–94)
MONOCYTES ABSOLUTE: 0.6 THOU/MM3 (ref 0.4–1.3)
MONOCYTES NFR BLD AUTO: 3.9 %
NEUTROPHILS ABSOLUTE: 14.9 THOU/MM3 (ref 1.8–7.7)
NEUTROPHILS NFR BLD AUTO: 91.2 %
NRBC BLD AUTO-RTO: 0 /100 WBC
NT-PROBNP SERPL IA-MCNC: 3978 PG/ML (ref 0–449)
OSMOLALITY SERPL CALC.SUM OF ELEC: 254.5 MOSMOL/KG (ref 275–300)
OSMOLALITY SERPL: 267 MOSMOL/KG (ref 275–295)
PLATELET # BLD AUTO: 220 THOU/MM3 (ref 130–400)
PMV BLD AUTO: 10.6 FL (ref 9.4–12.4)
POTASSIUM SERPL-SCNC: 4.5 MEQ/L (ref 3.5–5.2)
PROCALCITONIN SERPL IA-MCNC: 0.19 NG/ML (ref 0.01–0.09)
RBC # BLD AUTO: 2.82 MILL/MM3 (ref 4.7–6.1)
SARS-COV-2 RNA RESP QL NAA+PROBE: NOT DETECTED
SODIUM SERPL-SCNC: 123 MEQ/L (ref 135–145)
TROPONIN, HIGH SENSITIVITY: 191 NG/L (ref 0–12)
TROPONIN, HIGH SENSITIVITY: 195 NG/L (ref 0–12)
TSH SERPL DL<=0.005 MIU/L-ACNC: 3.1 UIU/ML (ref 0.4–4.2)
WBC # BLD AUTO: 16.3 THOU/MM3 (ref 4.8–10.8)

## 2024-09-27 PROCEDURE — 93005 ELECTROCARDIOGRAM TRACING: CPT

## 2024-09-27 PROCEDURE — 82330 ASSAY OF CALCIUM: CPT

## 2024-09-27 PROCEDURE — 84145 PROCALCITONIN (PCT): CPT

## 2024-09-27 PROCEDURE — 87641 MR-STAPH DNA AMP PROBE: CPT

## 2024-09-27 PROCEDURE — 6370000000 HC RX 637 (ALT 250 FOR IP): Performed by: PHYSICIAN ASSISTANT

## 2024-09-27 PROCEDURE — 96365 THER/PROPH/DIAG IV INF INIT: CPT

## 2024-09-27 PROCEDURE — 84443 ASSAY THYROID STIM HORMONE: CPT

## 2024-09-27 PROCEDURE — 96375 TX/PRO/DX INJ NEW DRUG ADDON: CPT

## 2024-09-27 PROCEDURE — 6360000002 HC RX W HCPCS: Performed by: PHYSICIAN ASSISTANT

## 2024-09-27 PROCEDURE — 71045 X-RAY EXAM CHEST 1 VIEW: CPT

## 2024-09-27 PROCEDURE — 94640 AIRWAY INHALATION TREATMENT: CPT

## 2024-09-27 PROCEDURE — 99285 EMERGENCY DEPT VISIT HI MDM: CPT

## 2024-09-27 PROCEDURE — 2060000000 HC ICU INTERMEDIATE R&B

## 2024-09-27 PROCEDURE — 2580000003 HC RX 258: Performed by: NURSE PRACTITIONER

## 2024-09-27 PROCEDURE — 2580000003 HC RX 258: Performed by: PHYSICIAN ASSISTANT

## 2024-09-27 PROCEDURE — 87040 BLOOD CULTURE FOR BACTERIA: CPT

## 2024-09-27 PROCEDURE — 84484 ASSAY OF TROPONIN QUANT: CPT

## 2024-09-27 PROCEDURE — 80048 BASIC METABOLIC PNL TOTAL CA: CPT

## 2024-09-27 PROCEDURE — 93010 ELECTROCARDIOGRAM REPORT: CPT | Performed by: NUCLEAR MEDICINE

## 2024-09-27 PROCEDURE — 36415 COLL VENOUS BLD VENIPUNCTURE: CPT

## 2024-09-27 PROCEDURE — 87636 SARSCOV2 & INF A&B AMP PRB: CPT

## 2024-09-27 PROCEDURE — 83605 ASSAY OF LACTIC ACID: CPT

## 2024-09-27 PROCEDURE — 83880 ASSAY OF NATRIURETIC PEPTIDE: CPT

## 2024-09-27 PROCEDURE — 83930 ASSAY OF BLOOD OSMOLALITY: CPT

## 2024-09-27 PROCEDURE — 85025 COMPLETE CBC W/AUTO DIFF WBC: CPT

## 2024-09-27 RX ORDER — SODIUM CHLORIDE 0.9 % (FLUSH) 0.9 %
5-40 SYRINGE (ML) INJECTION PRN
Status: DISCONTINUED | OUTPATIENT
Start: 2024-09-27 | End: 2024-09-30 | Stop reason: HOSPADM

## 2024-09-27 RX ORDER — POLYETHYLENE GLYCOL 3350 17 G/17G
17 POWDER, FOR SOLUTION ORAL DAILY PRN
Status: DISCONTINUED | OUTPATIENT
Start: 2024-09-27 | End: 2024-09-30 | Stop reason: HOSPADM

## 2024-09-27 RX ORDER — ALBUTEROL SULFATE 0.83 MG/ML
2.5 SOLUTION RESPIRATORY (INHALATION) 2 TIMES DAILY
Status: DISCONTINUED | OUTPATIENT
Start: 2024-09-27 | End: 2024-09-30 | Stop reason: HOSPADM

## 2024-09-27 RX ORDER — ISOSORBIDE MONONITRATE 30 MG/1
30 TABLET, EXTENDED RELEASE ORAL DAILY
Status: DISCONTINUED | OUTPATIENT
Start: 2024-09-28 | End: 2024-09-30 | Stop reason: HOSPADM

## 2024-09-27 RX ORDER — AZITHROMYCIN 250 MG/1
500 TABLET, FILM COATED ORAL ONCE
Status: COMPLETED | OUTPATIENT
Start: 2024-09-27 | End: 2024-09-27

## 2024-09-27 RX ORDER — SODIUM CHLORIDE 1 G/1
1 TABLET ORAL
Status: DISCONTINUED | OUTPATIENT
Start: 2024-09-28 | End: 2024-09-30 | Stop reason: HOSPADM

## 2024-09-27 RX ORDER — CALCIUM GLUCONATE 20 MG/ML
2000 INJECTION, SOLUTION INTRAVENOUS ONCE
Status: DISCONTINUED | OUTPATIENT
Start: 2024-09-27 | End: 2024-09-30 | Stop reason: HOSPADM

## 2024-09-27 RX ORDER — GUAIFENESIN 200 MG/10ML
200 LIQUID ORAL 4 TIMES DAILY PRN
Status: DISCONTINUED | OUTPATIENT
Start: 2024-09-27 | End: 2024-09-30 | Stop reason: HOSPADM

## 2024-09-27 RX ORDER — ONDANSETRON 4 MG/1
4 TABLET, ORALLY DISINTEGRATING ORAL EVERY 8 HOURS PRN
Status: DISCONTINUED | OUTPATIENT
Start: 2024-09-27 | End: 2024-09-30 | Stop reason: HOSPADM

## 2024-09-27 RX ORDER — SPIRONOLACTONE 25 MG/1
25 TABLET ORAL DAILY
Status: DISCONTINUED | OUTPATIENT
Start: 2024-09-28 | End: 2024-09-30 | Stop reason: HOSPADM

## 2024-09-27 RX ORDER — ONDANSETRON 2 MG/ML
4 INJECTION INTRAMUSCULAR; INTRAVENOUS EVERY 6 HOURS PRN
Status: DISCONTINUED | OUTPATIENT
Start: 2024-09-27 | End: 2024-09-30 | Stop reason: HOSPADM

## 2024-09-27 RX ORDER — ATORVASTATIN CALCIUM 40 MG/1
40 TABLET, FILM COATED ORAL NIGHTLY
Status: DISCONTINUED | OUTPATIENT
Start: 2024-09-27 | End: 2024-09-30 | Stop reason: HOSPADM

## 2024-09-27 RX ORDER — METOPROLOL TARTRATE 50 MG
50 TABLET ORAL EVERY 12 HOURS
Status: DISCONTINUED | OUTPATIENT
Start: 2024-09-27 | End: 2024-09-30 | Stop reason: HOSPADM

## 2024-09-27 RX ORDER — DIGOXIN 125 MCG
125 TABLET ORAL DAILY
Status: DISCONTINUED | OUTPATIENT
Start: 2024-09-28 | End: 2024-09-30 | Stop reason: HOSPADM

## 2024-09-27 RX ORDER — ALBUTEROL SULFATE 90 UG/1
2 INHALANT RESPIRATORY (INHALATION) EVERY 4 HOURS PRN
Status: DISCONTINUED | OUTPATIENT
Start: 2024-09-27 | End: 2024-09-30 | Stop reason: HOSPADM

## 2024-09-27 RX ORDER — PANTOPRAZOLE SODIUM 40 MG/10ML
40 INJECTION, POWDER, LYOPHILIZED, FOR SOLUTION INTRAVENOUS 2 TIMES DAILY
Status: DISCONTINUED | OUTPATIENT
Start: 2024-09-27 | End: 2024-09-30 | Stop reason: HOSPADM

## 2024-09-27 RX ORDER — SODIUM CHLORIDE 9 MG/ML
INJECTION, SOLUTION INTRAVENOUS CONTINUOUS
Status: ACTIVE | OUTPATIENT
Start: 2024-09-27 | End: 2024-09-28

## 2024-09-27 RX ORDER — DILTIAZEM HYDROCHLORIDE 120 MG/1
120 CAPSULE, COATED, EXTENDED RELEASE ORAL DAILY
Status: DISCONTINUED | OUTPATIENT
Start: 2024-09-28 | End: 2024-09-30 | Stop reason: HOSPADM

## 2024-09-27 RX ORDER — PANTOPRAZOLE SODIUM 40 MG/1
40 TABLET, DELAYED RELEASE ORAL DAILY
Status: DISCONTINUED | OUTPATIENT
Start: 2024-09-28 | End: 2024-09-27

## 2024-09-27 RX ORDER — POTASSIUM CHLORIDE 7.45 MG/ML
10 INJECTION INTRAVENOUS PRN
Status: DISCONTINUED | OUTPATIENT
Start: 2024-09-27 | End: 2024-09-30 | Stop reason: HOSPADM

## 2024-09-27 RX ORDER — MAGNESIUM SULFATE IN WATER 40 MG/ML
2000 INJECTION, SOLUTION INTRAVENOUS PRN
Status: DISCONTINUED | OUTPATIENT
Start: 2024-09-27 | End: 2024-09-30 | Stop reason: HOSPADM

## 2024-09-27 RX ORDER — IPRATROPIUM BROMIDE AND ALBUTEROL SULFATE 2.5; .5 MG/3ML; MG/3ML
1 SOLUTION RESPIRATORY (INHALATION) EVERY 30 MIN PRN
Status: DISCONTINUED | OUTPATIENT
Start: 2024-09-27 | End: 2024-09-30 | Stop reason: HOSPADM

## 2024-09-27 RX ORDER — 0.9 % SODIUM CHLORIDE 0.9 %
30 INTRAVENOUS SOLUTION INTRAVENOUS ONCE
Status: DISCONTINUED | OUTPATIENT
Start: 2024-09-27 | End: 2024-09-27

## 2024-09-27 RX ORDER — BUDESONIDE AND FORMOTEROL FUMARATE DIHYDRATE 160; 4.5 UG/1; UG/1
2 AEROSOL RESPIRATORY (INHALATION)
Status: DISCONTINUED | OUTPATIENT
Start: 2024-09-28 | End: 2024-09-30 | Stop reason: HOSPADM

## 2024-09-27 RX ORDER — SODIUM CHLORIDE 9 MG/ML
INJECTION, SOLUTION INTRAVENOUS PRN
Status: DISCONTINUED | OUTPATIENT
Start: 2024-09-27 | End: 2024-09-30 | Stop reason: HOSPADM

## 2024-09-27 RX ORDER — ACETAMINOPHEN 325 MG/1
650 TABLET ORAL EVERY 6 HOURS PRN
Status: DISCONTINUED | OUTPATIENT
Start: 2024-09-27 | End: 2024-09-30 | Stop reason: HOSPADM

## 2024-09-27 RX ORDER — VALSARTAN 160 MG/1
160 TABLET ORAL EVERY 12 HOURS SCHEDULED
Status: DISCONTINUED | OUTPATIENT
Start: 2024-09-27 | End: 2024-09-30 | Stop reason: HOSPADM

## 2024-09-27 RX ORDER — 0.9 % SODIUM CHLORIDE 0.9 %
30 INTRAVENOUS SOLUTION INTRAVENOUS ONCE
Status: COMPLETED | OUTPATIENT
Start: 2024-09-27 | End: 2024-09-27

## 2024-09-27 RX ORDER — POTASSIUM CHLORIDE 1500 MG/1
40 TABLET, EXTENDED RELEASE ORAL PRN
Status: DISCONTINUED | OUTPATIENT
Start: 2024-09-27 | End: 2024-09-30 | Stop reason: HOSPADM

## 2024-09-27 RX ORDER — ACETAMINOPHEN 650 MG/1
650 SUPPOSITORY RECTAL EVERY 6 HOURS PRN
Status: DISCONTINUED | OUTPATIENT
Start: 2024-09-27 | End: 2024-09-30 | Stop reason: HOSPADM

## 2024-09-27 RX ORDER — SODIUM CHLORIDE 0.9 % (FLUSH) 0.9 %
5-40 SYRINGE (ML) INJECTION EVERY 12 HOURS SCHEDULED
Status: DISCONTINUED | OUTPATIENT
Start: 2024-09-27 | End: 2024-09-30 | Stop reason: HOSPADM

## 2024-09-27 RX ADMIN — AZITHROMYCIN DIHYDRATE 500 MG: 250 TABLET ORAL at 19:47

## 2024-09-27 RX ADMIN — SODIUM CHLORIDE 3000 MG: 900 INJECTION INTRAVENOUS at 19:41

## 2024-09-27 RX ADMIN — Medication 2500 MG: at 20:18

## 2024-09-27 RX ADMIN — IPRATROPIUM BROMIDE AND ALBUTEROL SULFATE 1 DOSE: .5; 3 SOLUTION RESPIRATORY (INHALATION) at 20:59

## 2024-09-27 RX ADMIN — SODIUM CHLORIDE 2385 ML: 9 INJECTION, SOLUTION INTRAVENOUS at 19:46

## 2024-09-27 ASSESSMENT — HEART SCORE: ECG: NON-SPECIFC REPOLARIZATION DISTURBANCE/LBTB/PM

## 2024-09-27 NOTE — ED PROVIDER NOTES
managed by nursing home staff he was given 1 dose of Bactrim.    Patient's white count is 16.3.  Lactate of 3.4.  Procalcitonin 0.19.  Patient does not have a new renal insufficiency with a creatinine of 1.4 with a GFR of 49 and a BNP of 25.  Patient sodium is 123 potassium 4.5 chloride 93.  Patient's anion gap is 10.  Patient's troponin 195.  Chest x-ray does show he has an infiltrate.    Patient does have a heart score 7.  He does have elevated troponin I suspect this may be due to his renal failure however repeat will be performed.  EKG shows a paced rhythm with some PVCs as read by ED physician.  He does not have any chest pressure.  Will continue to monitor with repeat troponin. Hospitalist is aware.     Patient's vitals were continued to be monitored here in the ED.  Antibiotics and appropriate fluid was started.  Fluid was started at a lesser dose than the 30 cc/kg for actual body weight because patient does have obesity as well as his history of CHF.  Patient does have evidence of DIANNE.  I believe this may be also due to some dehydration however patient does have septicemia.  Patient was started on antibiotics.  He was given a DuoNeb which did help his wheezing he does not appear to have labored breathing at this time.  I will put the patient in  fair condition at this time.  Hospitalist was contacted.  On reevaluation and discussion of admission patient was resting much more comfortably in his room in no respiratory distress        Functioning independently as a Mid-Level Practitioner, I have fully participated in the care of this patient and I have reviewed and agree with all pertinent clinical information including history, physical exam, and plan. I have also reviewed and agree with the medications, allergies and past medical history section for this patient.    Nurses Notes reviewed and I agree except as noted in the HPI.    DIAGNOSTIC RESULTS      RADIOLOGY:   XR CHEST PORTABLE   Final Result   1.

## 2024-09-27 NOTE — ED TRIAGE NOTES
Pt to ED with shortness of breath. States that he has been very fatigued today when moving. Nursing home states that they got a room air oxygen of 80%. This RN got a room air of 98%. EKG done.

## 2024-09-28 ENCOUNTER — APPOINTMENT (OUTPATIENT)
Age: 89
DRG: 871 | End: 2024-09-28
Payer: MEDICARE

## 2024-09-28 LAB
ANION GAP SERPL CALC-SCNC: 14 MEQ/L (ref 8–16)
BACTERIA: ABNORMAL
BASOPHILS ABSOLUTE: 0 THOU/MM3 (ref 0–0.1)
BASOPHILS NFR BLD AUTO: 0.2 %
BILIRUB UR QL STRIP: NEGATIVE
BUN SERPL-MCNC: 21 MG/DL (ref 7–22)
CALCIUM SERPL-MCNC: 7.9 MG/DL (ref 8.5–10.5)
CASTS #/AREA URNS LPF: ABNORMAL /LPF
CASTS #/AREA URNS LPF: ABNORMAL /LPF
CHARACTER UR: CLEAR
CHARCOAL URNS QL MICRO: ABNORMAL
CHLORIDE 24H UR-SRATE: 44 MEQ/L
CHLORIDE SERPL-SCNC: 94 MEQ/L (ref 98–111)
CO2 SERPL-SCNC: 17 MEQ/L (ref 23–33)
COLOR UR: YELLOW
CREAT SERPL-MCNC: 1.3 MG/DL (ref 0.4–1.2)
CREAT UR-MCNC: 72.9 MG/DL
CRYSTALS URNS QL MICRO: ABNORMAL
DEPRECATED RDW RBC AUTO: 52.5 FL (ref 35–45)
ECHO AO ASC DIAM: 3.2 CM
ECHO AO ASCENDING AORTA INDEX: 1.37 CM/M2
ECHO AV AREA PEAK VELOCITY: 1.4 CM2
ECHO AV AREA VTI: 1.5 CM2
ECHO AV AREA/BSA PEAK VELOCITY: 0.6 CM2/M2
ECHO AV AREA/BSA VTI: 0.6 CM2/M2
ECHO AV CUSP MM: 0.7 CM
ECHO AV MEAN GRADIENT: 11 MMHG
ECHO AV MEAN VELOCITY: 1.6 M/S
ECHO AV PEAK GRADIENT: 18 MMHG
ECHO AV PEAK VELOCITY: 2.1 M/S
ECHO AV VELOCITY RATIO: 0.48
ECHO AV VTI: 37.5 CM
ECHO EST RA PRESSURE: 10 MMHG
ECHO LA AREA 2C: 38.3 CM2
ECHO LA DIAMETER INDEX: 2.19 CM/M2
ECHO LA DIAMETER: 5.1 CM
ECHO LA MINOR AXIS: 8 CM
ECHO LA VOL MOD A2C: 148 ML (ref 18–58)
ECHO LA VOLUME INDEX MOD A2C: 64 ML/M2 (ref 16–34)
ECHO LV EJECTION FRACTION BIPLANE: 50 % (ref 55–100)
ECHO LV FRACTIONAL SHORTENING: 25 % (ref 28–44)
ECHO LV INTERNAL DIMENSION DIASTOLE INDEX: 2.19 CM/M2
ECHO LV INTERNAL DIMENSION DIASTOLIC: 5.1 CM (ref 4.2–5.9)
ECHO LV INTERNAL DIMENSION SYSTOLIC INDEX: 1.63 CM/M2
ECHO LV INTERNAL DIMENSION SYSTOLIC: 3.8 CM
ECHO LV ISOVOLUMETRIC RELAXATION TIME (IVRT): 35 MS
ECHO LV IVSD: 1.1 CM (ref 0.6–1)
ECHO LV MASS 2D: 255.5 G (ref 88–224)
ECHO LV MASS INDEX 2D: 109.6 G/M2 (ref 49–115)
ECHO LV POSTERIOR WALL DIASTOLIC: 1.4 CM (ref 0.6–1)
ECHO LV RELATIVE WALL THICKNESS RATIO: 0.55
ECHO LVOT AREA: 2.8 CM2
ECHO LVOT AV VTI INDEX: 0.53
ECHO LVOT DIAM: 1.9 CM
ECHO LVOT MEAN GRADIENT: 3 MMHG
ECHO LVOT PEAK GRADIENT: 4 MMHG
ECHO LVOT PEAK VELOCITY: 1 M/S
ECHO LVOT STROKE VOLUME INDEX: 24.3 ML/M2
ECHO LVOT SV: 56.7 ML
ECHO LVOT VTI: 20 CM
ECHO MV AREA VTI: 1.4 CM2
ECHO MV E DECELERATION TIME (DT): 173 MS
ECHO MV E VELOCITY: 1.62 M/S
ECHO MV LVOT VTI INDEX: 2.09
ECHO MV MAX VELOCITY: 1.5 M/S
ECHO MV MEAN GRADIENT: 3 MMHG
ECHO MV MEAN VELOCITY: 0.7 M/S
ECHO MV PEAK GRADIENT: 9 MMHG
ECHO MV REGURGITANT PEAK GRADIENT: 85 MMHG
ECHO MV REGURGITANT PEAK VELOCITY: 4.6 M/S
ECHO MV VTI: 41.8 CM
ECHO PULMONARY ARTERY END DIASTOLIC PRESSURE: 5 MMHG
ECHO PV MAX VELOCITY: 1.1 M/S
ECHO PV MAX VELOCITY: 1.1 M/S
ECHO PV PEAK GRADIENT: 5 MMHG
ECHO RIGHT VENTRICULAR SYSTOLIC PRESSURE (RVSP): 43 MMHG
ECHO RV INTERNAL DIMENSION: 3.2 CM
ECHO TV E WAVE: 0.6 M/S
ECHO TV REGURGITANT MAX VELOCITY: 2.88 M/S
ECHO TV REGURGITANT PEAK GRADIENT: 33 MMHG
EOSINOPHIL NFR BLD AUTO: 2.9 %
EOSINOPHILS ABSOLUTE: 0.4 THOU/MM3 (ref 0–0.4)
EPITHELIAL CELLS, UA: ABNORMAL /HPF
ERYTHROCYTE [DISTWIDTH] IN BLOOD BY AUTOMATED COUNT: 15.1 % (ref 11.5–14.5)
GFR SERPL CREATININE-BSD FRML MDRD: 53 ML/MIN/1.73M2
GLUCOSE SERPL-MCNC: 99 MG/DL (ref 70–108)
GLUCOSE UR QL STRIP.AUTO: NEGATIVE MG/DL
HCT VFR BLD AUTO: 24.1 % (ref 42–52)
HCT VFR BLD AUTO: 24.1 % (ref 42–52)
HCT VFR BLD AUTO: 25.1 % (ref 42–52)
HGB BLD-MCNC: 7.9 GM/DL (ref 14–18)
HGB BLD-MCNC: 8.1 GM/DL (ref 14–18)
HGB BLD-MCNC: 8.2 GM/DL (ref 14–18)
HGB UR QL STRIP.AUTO: NEGATIVE
IMM GRANULOCYTES # BLD AUTO: 0.09 THOU/MM3 (ref 0–0.07)
IMM GRANULOCYTES NFR BLD AUTO: 0.7 %
KETONES UR QL STRIP.AUTO: NEGATIVE
LACTATE SERPL-SCNC: 1.2 MMOL/L (ref 0.5–2)
LACTATE SERPL-SCNC: 2.3 MMOL/L (ref 0.5–2)
LACTATE SERPL-SCNC: 3 MMOL/L (ref 0.5–2)
LEUKOCYTE ESTERASE UR QL STRIP.AUTO: ABNORMAL
LYMPHOCYTES ABSOLUTE: 0.4 THOU/MM3 (ref 1–4.8)
LYMPHOCYTES NFR BLD AUTO: 3.3 %
MCH RBC QN AUTO: 31.2 PG (ref 26–33)
MCHC RBC AUTO-ENTMCNC: 32.7 GM/DL (ref 32.2–35.5)
MCV RBC AUTO: 95.4 FL (ref 80–94)
MONOCYTES ABSOLUTE: 0.7 THOU/MM3 (ref 0.4–1.3)
MONOCYTES NFR BLD AUTO: 5.6 %
MRSA DNA SPEC QL NAA+PROBE: POSITIVE
NEUTROPHILS ABSOLUTE: 11 THOU/MM3 (ref 1.8–7.7)
NEUTROPHILS NFR BLD AUTO: 87.3 %
NITRITE UR QL STRIP.AUTO: NEGATIVE
NRBC BLD AUTO-RTO: 0 /100 WBC
OSMOLALITY SERPL CALC.SUM OF ELEC: 254.5 MOSMOL/KG (ref 275–300)
OSMOLALITY UR: 383 MOSMOL/KG (ref 250–750)
PH UR STRIP.AUTO: 6 [PH] (ref 5–9)
PLATELET # BLD AUTO: 203 THOU/MM3 (ref 130–400)
PMV BLD AUTO: 11 FL (ref 9.4–12.4)
POTASSIUM SERPL-SCNC: 4.8 MEQ/L (ref 3.5–5.2)
POTASSIUM UR-SCNC: 32.1 MEQ/L
PROT UR STRIP.AUTO-MCNC: NEGATIVE MG/DL
RBC # BLD AUTO: 2.63 MILL/MM3 (ref 4.7–6.1)
RBC #/AREA URNS HPF: ABNORMAL /HPF
RENAL EPI CELLS #/AREA URNS HPF: ABNORMAL /[HPF]
SODIUM SERPL-SCNC: 124 MEQ/L (ref 135–145)
SODIUM SERPL-SCNC: 125 MEQ/L (ref 135–145)
SODIUM SERPL-SCNC: 125 MEQ/L (ref 135–145)
SODIUM SERPL-SCNC: 126 MEQ/L (ref 135–145)
SODIUM UR-SCNC: 55 MEQ/L
SPECIFIC GRAVITY UA: 1.01 (ref 1–1.03)
TROPONIN, HIGH SENSITIVITY: 187 NG/L (ref 0–12)
UROBILINOGEN, URINE: 0.2 EU/DL (ref 0–1)
UUN 24H UR-MCNC: 508 MG/DL
WBC # BLD AUTO: 12.6 THOU/MM3 (ref 4.8–10.8)
WBC #/AREA URNS HPF: ABNORMAL /HPF
YEAST LIKE FUNGI URNS QL MICRO: ABNORMAL

## 2024-09-28 PROCEDURE — 6360000002 HC RX W HCPCS

## 2024-09-28 PROCEDURE — 85025 COMPLETE CBC W/AUTO DIFF WBC: CPT

## 2024-09-28 PROCEDURE — 2060000000 HC ICU INTERMEDIATE R&B

## 2024-09-28 PROCEDURE — 6370000000 HC RX 637 (ALT 250 FOR IP)

## 2024-09-28 PROCEDURE — 84300 ASSAY OF URINE SODIUM: CPT

## 2024-09-28 PROCEDURE — 84133 ASSAY OF URINE POTASSIUM: CPT

## 2024-09-28 PROCEDURE — 2580000003 HC RX 258: Performed by: HOSPITALIST

## 2024-09-28 PROCEDURE — 94761 N-INVAS EAR/PLS OXIMETRY MLT: CPT

## 2024-09-28 PROCEDURE — 2580000003 HC RX 258

## 2024-09-28 PROCEDURE — 83605 ASSAY OF LACTIC ACID: CPT

## 2024-09-28 PROCEDURE — 80048 BASIC METABOLIC PNL TOTAL CA: CPT

## 2024-09-28 PROCEDURE — 93306 TTE W/DOPPLER COMPLETE: CPT

## 2024-09-28 PROCEDURE — 93306 TTE W/DOPPLER COMPLETE: CPT | Performed by: INTERNAL MEDICINE

## 2024-09-28 PROCEDURE — 81001 URINALYSIS AUTO W/SCOPE: CPT

## 2024-09-28 PROCEDURE — 84540 ASSAY OF URINE/UREA-N: CPT

## 2024-09-28 PROCEDURE — 85018 HEMOGLOBIN: CPT

## 2024-09-28 PROCEDURE — 36415 COLL VENOUS BLD VENIPUNCTURE: CPT

## 2024-09-28 PROCEDURE — 82570 ASSAY OF URINE CREATININE: CPT

## 2024-09-28 PROCEDURE — 94640 AIRWAY INHALATION TREATMENT: CPT

## 2024-09-28 PROCEDURE — 83935 ASSAY OF URINE OSMOLALITY: CPT

## 2024-09-28 PROCEDURE — 85014 HEMATOCRIT: CPT

## 2024-09-28 PROCEDURE — 82436 ASSAY OF URINE CHLORIDE: CPT

## 2024-09-28 PROCEDURE — 84295 ASSAY OF SERUM SODIUM: CPT

## 2024-09-28 PROCEDURE — 99233 SBSQ HOSP IP/OBS HIGH 50: CPT | Performed by: HOSPITALIST

## 2024-09-28 RX ORDER — 0.9 % SODIUM CHLORIDE 0.9 %
500 INTRAVENOUS SOLUTION INTRAVENOUS ONCE
Status: COMPLETED | OUTPATIENT
Start: 2024-09-28 | End: 2024-09-28

## 2024-09-28 RX ADMIN — VALSARTAN 160 MG: 160 TABLET ORAL at 08:36

## 2024-09-28 RX ADMIN — SODIUM CHLORIDE 1 G: 1 TABLET ORAL at 08:35

## 2024-09-28 RX ADMIN — PANTOPRAZOLE SODIUM 40 MG: 40 INJECTION, POWDER, FOR SOLUTION INTRAVENOUS at 00:08

## 2024-09-28 RX ADMIN — SODIUM CHLORIDE 1 G: 1 TABLET ORAL at 12:09

## 2024-09-28 RX ADMIN — PIPERACILLIN AND TAZOBACTAM 3375 MG: 3; .375 INJECTION, POWDER, FOR SOLUTION INTRAVENOUS at 23:55

## 2024-09-28 RX ADMIN — ACETAMINOPHEN 650 MG: 325 TABLET ORAL at 00:21

## 2024-09-28 RX ADMIN — SODIUM CHLORIDE 1 G: 1 TABLET ORAL at 18:19

## 2024-09-28 RX ADMIN — PIPERACILLIN AND TAZOBACTAM 3375 MG: 3; .375 INJECTION, POWDER, FOR SOLUTION INTRAVENOUS at 07:02

## 2024-09-28 RX ADMIN — PIPERACILLIN AND TAZOBACTAM 4500 MG: 4; .5 INJECTION, POWDER, FOR SOLUTION INTRAVENOUS at 01:59

## 2024-09-28 RX ADMIN — METOPROLOL TARTRATE 50 MG: 50 TABLET, FILM COATED ORAL at 23:58

## 2024-09-28 RX ADMIN — ALBUTEROL SULFATE 2.5 MG: 2.5 SOLUTION RESPIRATORY (INHALATION) at 20:41

## 2024-09-28 RX ADMIN — DILTIAZEM HYDROCHLORIDE 120 MG: 120 CAPSULE, EXTENDED RELEASE ORAL at 08:35

## 2024-09-28 RX ADMIN — ATORVASTATIN CALCIUM 40 MG: 40 TABLET, FILM COATED ORAL at 20:14

## 2024-09-28 RX ADMIN — TRAZODONE HYDROCHLORIDE 150 MG: 50 TABLET ORAL at 20:14

## 2024-09-28 RX ADMIN — METOPROLOL TARTRATE 50 MG: 50 TABLET, FILM COATED ORAL at 12:09

## 2024-09-28 RX ADMIN — BUDESONIDE AND FORMOTEROL FUMARATE DIHYDRATE 2 PUFF: 160; 4.5 AEROSOL RESPIRATORY (INHALATION) at 09:15

## 2024-09-28 RX ADMIN — SODIUM CHLORIDE, PRESERVATIVE FREE 10 ML: 5 INJECTION INTRAVENOUS at 00:08

## 2024-09-28 RX ADMIN — PIPERACILLIN AND TAZOBACTAM 3375 MG: 3; .375 INJECTION, POWDER, FOR SOLUTION INTRAVENOUS at 15:33

## 2024-09-28 RX ADMIN — PANTOPRAZOLE SODIUM 40 MG: 40 INJECTION, POWDER, FOR SOLUTION INTRAVENOUS at 09:56

## 2024-09-28 RX ADMIN — Medication 1250 MG: at 20:23

## 2024-09-28 RX ADMIN — SODIUM CHLORIDE: 9 INJECTION, SOLUTION INTRAVENOUS at 00:17

## 2024-09-28 RX ADMIN — DIGOXIN 125 MCG: 0.12 TABLET ORAL at 08:36

## 2024-09-28 RX ADMIN — ISOSORBIDE MONONITRATE 30 MG: 30 TABLET, EXTENDED RELEASE ORAL at 08:36

## 2024-09-28 RX ADMIN — VALSARTAN 160 MG: 160 TABLET ORAL at 20:14

## 2024-09-28 RX ADMIN — PANTOPRAZOLE SODIUM 40 MG: 40 INJECTION, POWDER, FOR SOLUTION INTRAVENOUS at 20:14

## 2024-09-28 RX ADMIN — ALBUTEROL SULFATE 2.5 MG: 2.5 SOLUTION RESPIRATORY (INHALATION) at 09:15

## 2024-09-28 RX ADMIN — SODIUM CHLORIDE, PRESERVATIVE FREE 10 ML: 5 INJECTION INTRAVENOUS at 09:57

## 2024-09-28 RX ADMIN — SODIUM CHLORIDE 500 ML: 9 INJECTION, SOLUTION INTRAVENOUS at 16:50

## 2024-09-28 ASSESSMENT — PAIN SCALES - GENERAL
PAINLEVEL_OUTOF10: 3
PAINLEVEL_OUTOF10: 1

## 2024-09-28 NOTE — RT PROTOCOL NOTE
RT Inhaler-Nebulizer Bronchodilator Protocol Note    There is a bronchodilator order in the chart from a provider indicating to follow the RT Bronchodilator Protocol and there is an “Initiate RT Inhaler-Nebulizer Bronchodilator Protocol” order as well (see protocol at bottom of note).    CXR Findings:  XR CHEST PORTABLE    Result Date: 9/27/2024  1. Probable left lingular pneumonia 2. Small left pleural effusion. This document has been electronically signed by: Colten Epperson MD on 09/27/2024 06:36 PM      The findings from the last RT Protocol Assessment were as follows:   History Pulmonary Disease: Chronic pulmonary disease  Respiratory Pattern: Regular pattern and RR 12-20 bpm  Breath Sounds: Slightly diminished and/or crackles  Cough: Strong, spontaneous, non-productive  Indication for Bronchodilator Therapy:    Bronchodilator Assessment Score: 4    Aerosolized bronchodilator medication orders have been revised according to the RT Inhaler-Nebulizer Bronchodilator Protocol below.    Respiratory Therapist to perform RT Therapy Protocol Assessment initially then follow the protocol.  Repeat RT Therapy Protocol Assessment PRN for score 0-3 or on second treatment, BID, and PRN for scores above 3.    No Indications - adjust the frequency to every 6 hours PRN wheezing or bronchospasm, if no treatments needed after 48 hours then discontinue using Per Protocol order mode.     If indication present, adjust the RT bronchodilator orders based on the Bronchodilator Assessment Score as indicated below.  Use Inhaler orders unless patient has one or more of the following: on home nebulizer, not able to hold breath for 10 seconds, is not alert and oriented, cannot activate and use MDI correctly, or respiratory rate 25 breaths per minute or more, then use the equivalent nebulizer order(s) with same Frequency and PRN reasons based on the score.  If a patient is on this medication at home then do not decrease Frequency below that

## 2024-09-28 NOTE — RT PROTOCOL NOTE
RT Nebulizer Bronchodilator Protocol Note    There is a bronchodilator order in the chart from a provider indicating to follow the RT Bronchodilator Protocol and there is an “Initiate RT Bronchodilator Protocol” order as well (see protocol at bottom of note).    CXR Findings:  XR CHEST PORTABLE    Result Date: 9/27/2024  1. Probable left lingular pneumonia 2. Small left pleural effusion. This document has been electronically signed by: Colten Epperson MD on 09/27/2024 06:36 PM      The findings from the last RT Protocol Assessment were as follows:  Smoking: Chronic pulmonary disease  Respiratory Pattern: Regular pattern and RR 12-20 bpm  Breath Sounds: Slightly diminished and/or crackles  Cough: Strong, spontaneous, non-productive  Indication for Bronchodilator Therapy: Decreased or absent breath sounds, On home bronchodilators  Bronchodilator Assessment Score: 4    Aerosolized bronchodilator medication orders have been revised according to the RT Nebulizer Bronchodilator Protocol below.    Respiratory Therapist to perform RT Therapy Protocol Assessment initially then follow the protocol.  Repeat RT Therapy Protocol Assessment PRN for score 0-3 or on second treatment, BID, and PRN for scores above 3.    No Indications - adjust the frequency to every 6 hours PRN wheezing or bronchospasm, if no treatments needed after 48 hours then discontinue using Per Protocol order mode.     If indication present, adjust the RT bronchodilator orders based on the Bronchodilator Assessment Score as indicated below.  If a patient is on this medication at home then do not decrease Frequency below that used at home.    0-3 - enter or revise RT bronchodilator order(s) to equivalent RT Bronchodilator order with Frequency of every 4 hours PRN for wheezing or increased work of breathing using Per Protocol order mode.       4-6 - enter or revise RT Bronchodilator order(s) to two equivalent RT bronchodilator orders with one order with BID

## 2024-09-28 NOTE — H&P
History & Physical  Internal Medicine Resident         Patient: Roberto Prescott 88 y.o. male      : 1935  Date of Admission: 2024  Date of Service: Pt seen/examined on 24 and Admitted to Inpatient with expected LOS less than two midnights due to medical therapy.       ASSESSMENT AND PLAN  Sepsis: SOFA: 2.  Possible source of infection: Pneumonia versus back abscess versus UTI.  Patient with history of UTI few weeks ago that was treated.  Patient had notable abscess on right upper back and was started on Bactrim on .  CXR  showed probable left lingular pneumonia.  Patient reports productive cough with yellow sputum.  Patient given dose of Unasyn, azithromycin, vancomycin in ED. Patient given sepsis bolus with normal saline in ED. patient has history of MRSA.  Start Zosyn 3375 every 8 hours  Pharmacy to dose vancomycin  Start gentle fluids with normal saline 50 mL/h for 12 hours due to history of heart failure  MRSA nares ordered  Blood culture x 2 ordered in ED pending  UA, Pneumonia panel, respiratory culture ordered  Chronic normocytic anemia with concern for acute bleeding: Baseline hemoglobin: 11.  Hemoglobin on arrival 8.7.  Last recorded hemoglobin from 2022 was 11.8.  Patient reports 3-4 days of black diarrhea.  Patient takes iron supplementation.  Denies any NSAID use.  Denies any abdominal pain.  Start clear liquid diet  Repeat H&H tonight.  If stable; okay to continue clear liquid diet  Monitor stools  Start Protonix 40 mg IV twice daily  Holding home Xarelto  Consider GI consult in a.m. if worsening hemoglobin  Shortness of breath likely secondary to pneumonia: History of COPD and asthma.  No formal PFTs on record.  Per SNF patient satting in the 80s on room air.  No home supplemental oxygen.  No documented hypoxia in ED.  Patient breathing comfortably on room air but reports shortness of breath.  Home medication: Albuterol, Symbicort.   Start Symbicort, albuterol as

## 2024-09-28 NOTE — CONSULTS
Patient seen evaluated consult dictated plan for diagnostic  on Monday morning, continue PPI transfuse as needed

## 2024-09-28 NOTE — ED NOTES
ED to inpatient nurses report      Chief Complaint:  Chief Complaint   Patient presents with    Shortness of Breath     Present to ED from: home    MOA:     LOC: alert and orientated to name, place, date  Mobility: Requires assistance * 1  Oxygen Baseline:  roomair    Current needs required: N/A     Code Status:   DNR-CCA      Mental Status:  Level of Consciousness: Alert (0)    Psych Assessment:        Vitals:  Patient Vitals for the past 24 hrs:   BP Temp Temp src Pulse Resp SpO2 Height Weight   09/27/24 2204 (!) 106/55 -- -- 79 22 98 % -- --   09/27/24 2102 -- -- -- -- -- 95 % -- --   09/27/24 2045 109/84 -- -- 80 24 99 % -- --   09/27/24 1936 -- -- -- -- -- -- 1.85 m (6' 0.84\") --   09/27/24 1925 (!) 106/45 98.4 °F (36.9 °C) Oral 68 18 100 % -- --   09/27/24 1717 111/85 -- -- 87 24 100 % -- 104.3 kg (230 lb)        LDAs:   Peripheral IV 09/27/24 Distal;Left;Posterior Forearm (Active)   Site Assessment Clean, dry & intact 09/27/24 2051   Line Status Flushed 09/27/24 2051   Line Care Connections checked and tightened 09/27/24 2051   Phlebitis Assessment No symptoms 09/27/24 2051   Infiltration Assessment 0 09/27/24 2051   Dressing Status Clean, dry & intact 09/27/24 2051   Dressing Type Transparent 09/27/24 2051       Ambulatory Status:  No data recorded    Diagnosis:  DISPOSITION Admitted 09/27/2024 08:59:54 PM   Final diagnoses:   Pneumonia of left lung due to infectious organism, unspecified part of lung   Septicemia (HCC)   Acute renal failure, unspecified acute renal failure type (HCC)   Elevated troponin   Shortness of breath        Consults:  PHARMACY TO DOSE VANCOMYCIN     Pain Score:       C-SSRS:   Risk of Suicide: No Risk    Sepsis Screening:       Bynum Fall Risk:       Swallow Screening        Preferred Language:   English      ALLERGIES     Metolazone and Thiazide-type diuretics    SURGICAL HISTORY       Past Surgical History:   Procedure Laterality Date    ABDOMEN SURGERY      ABDOMEN SURGERY N/A

## 2024-09-28 NOTE — ED NOTES
Called  and spoke with Cha who approved patient transport to [facility]. Patient is in stable condition.

## 2024-09-29 LAB
ANION GAP SERPL CALC-SCNC: 13 MEQ/L (ref 8–16)
BASOPHILS ABSOLUTE: 0 THOU/MM3 (ref 0–0.1)
BASOPHILS NFR BLD AUTO: 0.2 %
BUN SERPL-MCNC: 16 MG/DL (ref 7–22)
CALCIUM SERPL-MCNC: 8.3 MG/DL (ref 8.5–10.5)
CHLORIDE SERPL-SCNC: 96 MEQ/L (ref 98–111)
CO2 SERPL-SCNC: 17 MEQ/L (ref 23–33)
CREAT SERPL-MCNC: 1 MG/DL (ref 0.4–1.2)
DEPRECATED RDW RBC AUTO: 53.3 FL (ref 35–45)
EKG Q-T INTERVAL: 358 MS
EKG QRS DURATION: 94 MS
EKG QTC CALCULATION (BAZETT): 375 MS
EKG R AXIS: 51 DEGREES
EKG T AXIS: -173 DEGREES
EKG VENTRICULAR RATE: 66 BPM
EOSINOPHIL NFR BLD AUTO: 6.9 %
EOSINOPHILS ABSOLUTE: 0.7 THOU/MM3 (ref 0–0.4)
ERYTHROCYTE [DISTWIDTH] IN BLOOD BY AUTOMATED COUNT: 15.3 % (ref 11.5–14.5)
GFR SERPL CREATININE-BSD FRML MDRD: 73 ML/MIN/1.73M2
GLUCOSE SERPL-MCNC: 89 MG/DL (ref 70–108)
HCT VFR BLD AUTO: 25.5 % (ref 42–52)
HCT VFR BLD AUTO: 25.8 % (ref 42–52)
HCT VFR BLD AUTO: 26 % (ref 42–52)
HCT VFR BLD AUTO: 26.3 % (ref 42–52)
HGB BLD-MCNC: 8 GM/DL (ref 14–18)
HGB BLD-MCNC: 8.1 GM/DL (ref 14–18)
HGB BLD-MCNC: 8.2 GM/DL (ref 14–18)
HGB BLD-MCNC: 8.4 GM/DL (ref 14–18)
IMM GRANULOCYTES # BLD AUTO: 0.06 THOU/MM3 (ref 0–0.07)
IMM GRANULOCYTES NFR BLD AUTO: 0.6 %
LYMPHOCYTES ABSOLUTE: 0.4 THOU/MM3 (ref 1–4.8)
LYMPHOCYTES NFR BLD AUTO: 3.6 %
MCH RBC QN AUTO: 30.6 PG (ref 26–33)
MCHC RBC AUTO-ENTMCNC: 32.2 GM/DL (ref 32.2–35.5)
MCV RBC AUTO: 95.1 FL (ref 80–94)
MONOCYTES ABSOLUTE: 0.7 THOU/MM3 (ref 0.4–1.3)
MONOCYTES NFR BLD AUTO: 7.5 %
NEUTROPHILS ABSOLUTE: 8 THOU/MM3 (ref 1.8–7.7)
NEUTROPHILS NFR BLD AUTO: 81.2 %
NRBC BLD AUTO-RTO: 0 /100 WBC
PLATELET # BLD AUTO: 212 THOU/MM3 (ref 130–400)
PMV BLD AUTO: 10.8 FL (ref 9.4–12.4)
POTASSIUM SERPL-SCNC: 4.3 MEQ/L (ref 3.5–5.2)
RBC # BLD AUTO: 2.68 MILL/MM3 (ref 4.7–6.1)
SODIUM SERPL-SCNC: 124 MEQ/L (ref 135–145)
SODIUM SERPL-SCNC: 125 MEQ/L (ref 135–145)
SODIUM SERPL-SCNC: 125 MEQ/L (ref 135–145)
SODIUM SERPL-SCNC: 126 MEQ/L (ref 135–145)
SODIUM SERPL-SCNC: 126 MEQ/L (ref 135–145)
TROPONIN, HIGH SENSITIVITY: 200 NG/L (ref 0–12)
VANCOMYCIN SERPL-MCNC: 13.3 UG/ML (ref 0.1–39.9)
WBC # BLD AUTO: 9.9 THOU/MM3 (ref 4.8–10.8)

## 2024-09-29 PROCEDURE — 2580000003 HC RX 258

## 2024-09-29 PROCEDURE — 2700000000 HC OXYGEN THERAPY PER DAY

## 2024-09-29 PROCEDURE — 2060000000 HC ICU INTERMEDIATE R&B

## 2024-09-29 PROCEDURE — 85018 HEMOGLOBIN: CPT

## 2024-09-29 PROCEDURE — 6370000000 HC RX 637 (ALT 250 FOR IP)

## 2024-09-29 PROCEDURE — 94640 AIRWAY INHALATION TREATMENT: CPT

## 2024-09-29 PROCEDURE — 93005 ELECTROCARDIOGRAM TRACING: CPT | Performed by: HOSPITALIST

## 2024-09-29 PROCEDURE — 36415 COLL VENOUS BLD VENIPUNCTURE: CPT

## 2024-09-29 PROCEDURE — 84484 ASSAY OF TROPONIN QUANT: CPT

## 2024-09-29 PROCEDURE — 85014 HEMATOCRIT: CPT

## 2024-09-29 PROCEDURE — 84295 ASSAY OF SERUM SODIUM: CPT

## 2024-09-29 PROCEDURE — 6360000002 HC RX W HCPCS: Performed by: HOSPITALIST

## 2024-09-29 PROCEDURE — 94761 N-INVAS EAR/PLS OXIMETRY MLT: CPT

## 2024-09-29 PROCEDURE — 93010 ELECTROCARDIOGRAM REPORT: CPT | Performed by: NUCLEAR MEDICINE

## 2024-09-29 PROCEDURE — 99232 SBSQ HOSP IP/OBS MODERATE 35: CPT | Performed by: HOSPITALIST

## 2024-09-29 PROCEDURE — 6360000002 HC RX W HCPCS

## 2024-09-29 PROCEDURE — 80202 ASSAY OF VANCOMYCIN: CPT

## 2024-09-29 PROCEDURE — 85025 COMPLETE CBC W/AUTO DIFF WBC: CPT

## 2024-09-29 PROCEDURE — 80048 BASIC METABOLIC PNL TOTAL CA: CPT

## 2024-09-29 RX ORDER — BUMETANIDE 0.25 MG/ML
1 INJECTION INTRAMUSCULAR; INTRAVENOUS DAILY
Status: DISCONTINUED | OUTPATIENT
Start: 2024-09-29 | End: 2024-09-30 | Stop reason: HOSPADM

## 2024-09-29 RX ADMIN — VANCOMYCIN HYDROCHLORIDE 1000 MG: 1 INJECTION, POWDER, LYOPHILIZED, FOR SOLUTION INTRAVENOUS at 09:52

## 2024-09-29 RX ADMIN — PANTOPRAZOLE SODIUM 40 MG: 40 INJECTION, POWDER, FOR SOLUTION INTRAVENOUS at 09:52

## 2024-09-29 RX ADMIN — PIPERACILLIN AND TAZOBACTAM 3375 MG: 3; .375 INJECTION, POWDER, FOR SOLUTION INTRAVENOUS at 06:54

## 2024-09-29 RX ADMIN — SODIUM CHLORIDE 1 G: 1 TABLET ORAL at 12:30

## 2024-09-29 RX ADMIN — PIPERACILLIN AND TAZOBACTAM 3375 MG: 3; .375 INJECTION, POWDER, FOR SOLUTION INTRAVENOUS at 23:56

## 2024-09-29 RX ADMIN — VANCOMYCIN HYDROCHLORIDE 1000 MG: 1 INJECTION, POWDER, LYOPHILIZED, FOR SOLUTION INTRAVENOUS at 21:21

## 2024-09-29 RX ADMIN — ALBUTEROL SULFATE 2.5 MG: 2.5 SOLUTION RESPIRATORY (INHALATION) at 09:31

## 2024-09-29 RX ADMIN — PANTOPRAZOLE SODIUM 40 MG: 40 INJECTION, POWDER, FOR SOLUTION INTRAVENOUS at 21:15

## 2024-09-29 RX ADMIN — BUDESONIDE AND FORMOTEROL FUMARATE DIHYDRATE 2 PUFF: 160; 4.5 AEROSOL RESPIRATORY (INHALATION) at 09:40

## 2024-09-29 RX ADMIN — ALBUTEROL SULFATE 2.5 MG: 2.5 SOLUTION RESPIRATORY (INHALATION) at 20:45

## 2024-09-29 RX ADMIN — BUMETANIDE 1 MG: 0.25 INJECTION INTRAMUSCULAR; INTRAVENOUS at 12:16

## 2024-09-29 RX ADMIN — SODIUM CHLORIDE 1 G: 1 TABLET ORAL at 09:47

## 2024-09-29 RX ADMIN — VALSARTAN 160 MG: 160 TABLET ORAL at 21:15

## 2024-09-29 RX ADMIN — TRAZODONE HYDROCHLORIDE 150 MG: 50 TABLET ORAL at 21:15

## 2024-09-29 RX ADMIN — PIPERACILLIN AND TAZOBACTAM 3375 MG: 3; .375 INJECTION, POWDER, FOR SOLUTION INTRAVENOUS at 15:49

## 2024-09-29 RX ADMIN — METOPROLOL TARTRATE 50 MG: 50 TABLET, FILM COATED ORAL at 23:55

## 2024-09-29 RX ADMIN — ATORVASTATIN CALCIUM 40 MG: 40 TABLET, FILM COATED ORAL at 21:15

## 2024-09-29 RX ADMIN — SODIUM CHLORIDE 1 G: 1 TABLET ORAL at 15:49

## 2024-09-29 RX ADMIN — BUDESONIDE AND FORMOTEROL FUMARATE DIHYDRATE 2 PUFF: 160; 4.5 AEROSOL RESPIRATORY (INHALATION) at 20:50

## 2024-09-29 NOTE — RT PROTOCOL NOTE
RT Inhaler-Nebulizer Bronchodilator Protocol Note    There is a bronchodilator order in the chart from a provider indicating to follow the RT Bronchodilator Protocol and there is an “Initiate RT Inhaler-Nebulizer Bronchodilator Protocol” order as well (see protocol at bottom of note).    CXR Findings:  XR CHEST PORTABLE    Result Date: 9/27/2024  1. Probable left lingular pneumonia 2. Small left pleural effusion. This document has been electronically signed by: Colten Epperson MD on 09/27/2024 06:36 PM      The findings from the last RT Protocol Assessment were as follows:   History Pulmonary Disease: Chronic pulmonary disease  Respiratory Pattern: Regular pattern and RR 12-20 bpm  Breath Sounds: Slightly diminished and/or crackles  Cough: Strong, spontaneous, non-productive  Indication for Bronchodilator Therapy: Decreased or absent breath sounds, On home bronchodilators  Bronchodilator Assessment Score: 4    Aerosolized bronchodilator medication orders have been revised according to the RT Inhaler-Nebulizer Bronchodilator Protocol below.    Respiratory Therapist to perform RT Therapy Protocol Assessment initially then follow the protocol.  Repeat RT Therapy Protocol Assessment PRN for score 0-3 or on second treatment, BID, and PRN for scores above 3.    No Indications - adjust the frequency to every 6 hours PRN wheezing or bronchospasm, if no treatments needed after 48 hours then discontinue using Per Protocol order mode.     If indication present, adjust the RT bronchodilator orders based on the Bronchodilator Assessment Score as indicated below.  Use Inhaler orders unless patient has one or more of the following: on home nebulizer, not able to hold breath for 10 seconds, is not alert and oriented, cannot activate and use MDI correctly, or respiratory rate 25 breaths per minute or more, then use the equivalent nebulizer order(s) with same Frequency and PRN reasons based on the score.  If a patient is on this

## 2024-09-29 NOTE — PROCEDURES
PROCEDURE NOTE  Date: 9/29/2024   Name: Roberto Prescott  YOB: 1935    Procedures  12 lead EKG completed. Results handed to Mariusz Suarez CET

## 2024-09-30 ENCOUNTER — ANESTHESIA (OUTPATIENT)
Dept: ENDOSCOPY | Age: 89
DRG: 871 | End: 2024-09-30
Payer: MEDICARE

## 2024-09-30 ENCOUNTER — ANESTHESIA EVENT (OUTPATIENT)
Dept: ENDOSCOPY | Age: 89
DRG: 871 | End: 2024-09-30
Payer: MEDICARE

## 2024-09-30 VITALS
RESPIRATION RATE: 22 BRPM | BODY MASS INDEX: 32.2 KG/M2 | HEART RATE: 76 BPM | TEMPERATURE: 97.7 F | DIASTOLIC BLOOD PRESSURE: 74 MMHG | OXYGEN SATURATION: 100 % | SYSTOLIC BLOOD PRESSURE: 109 MMHG | WEIGHT: 242.95 LBS | HEIGHT: 73 IN

## 2024-09-30 LAB
ANION GAP SERPL CALC-SCNC: 11 MEQ/L (ref 8–16)
BASOPHILS ABSOLUTE: 0 THOU/MM3 (ref 0–0.1)
BASOPHILS NFR BLD AUTO: 0.1 %
BUN SERPL-MCNC: 12 MG/DL (ref 7–22)
CALCIUM SERPL-MCNC: 8.2 MG/DL (ref 8.5–10.5)
CHLORIDE SERPL-SCNC: 96 MEQ/L (ref 98–111)
CO2 SERPL-SCNC: 19 MEQ/L (ref 23–33)
CREAT SERPL-MCNC: 0.9 MG/DL (ref 0.4–1.2)
DEPRECATED RDW RBC AUTO: 50.5 FL (ref 35–45)
EOSINOPHIL NFR BLD AUTO: 6.4 %
EOSINOPHILS ABSOLUTE: 0.6 THOU/MM3 (ref 0–0.4)
ERYTHROCYTE [DISTWIDTH] IN BLOOD BY AUTOMATED COUNT: 15.2 % (ref 11.5–14.5)
GFR SERPL CREATININE-BSD FRML MDRD: 83 ML/MIN/1.73M2
GLUCOSE SERPL-MCNC: 89 MG/DL (ref 70–108)
HCT VFR BLD AUTO: 24.8 % (ref 42–52)
HGB BLD-MCNC: 8.3 GM/DL (ref 14–18)
IMM GRANULOCYTES # BLD AUTO: 0.04 THOU/MM3 (ref 0–0.07)
IMM GRANULOCYTES NFR BLD AUTO: 0.5 %
LYMPHOCYTES ABSOLUTE: 0.4 THOU/MM3 (ref 1–4.8)
LYMPHOCYTES NFR BLD AUTO: 5 %
MCH RBC QN AUTO: 30.6 PG (ref 26–33)
MCHC RBC AUTO-ENTMCNC: 33.5 GM/DL (ref 32.2–35.5)
MCV RBC AUTO: 91.5 FL (ref 80–94)
MONOCYTES ABSOLUTE: 0.9 THOU/MM3 (ref 0.4–1.3)
MONOCYTES NFR BLD AUTO: 10.6 %
NEUTROPHILS ABSOLUTE: 6.8 THOU/MM3 (ref 1.8–7.7)
NEUTROPHILS NFR BLD AUTO: 77.4 %
NRBC BLD AUTO-RTO: 0 /100 WBC
PLATELET # BLD AUTO: 216 THOU/MM3 (ref 130–400)
PMV BLD AUTO: 10.5 FL (ref 9.4–12.4)
POTASSIUM SERPL-SCNC: 4 MEQ/L (ref 3.5–5.2)
RBC # BLD AUTO: 2.71 MILL/MM3 (ref 4.7–6.1)
SODIUM SERPL-SCNC: 126 MEQ/L (ref 135–145)
WBC # BLD AUTO: 8.8 THOU/MM3 (ref 4.8–10.8)

## 2024-09-30 PROCEDURE — 6360000002 HC RX W HCPCS: Performed by: NURSE ANESTHETIST, CERTIFIED REGISTERED

## 2024-09-30 PROCEDURE — 0W3P8ZZ CONTROL BLEEDING IN GASTROINTESTINAL TRACT, VIA NATURAL OR ARTIFICIAL OPENING ENDOSCOPIC: ICD-10-PCS | Performed by: ANESTHESIOLOGY

## 2024-09-30 PROCEDURE — 2709999900 HC NON-CHARGEABLE SUPPLY: Performed by: INTERNAL MEDICINE

## 2024-09-30 PROCEDURE — 80048 BASIC METABOLIC PNL TOTAL CA: CPT

## 2024-09-30 PROCEDURE — 99239 HOSP IP/OBS DSCHRG MGMT >30: CPT | Performed by: HOSPITALIST

## 2024-09-30 PROCEDURE — 3700000001 HC ADD 15 MINUTES (ANESTHESIA): Performed by: INTERNAL MEDICINE

## 2024-09-30 PROCEDURE — 3609013000 HC EGD TRANSORAL CONTROL BLEEDING ANY METHOD: Performed by: INTERNAL MEDICINE

## 2024-09-30 PROCEDURE — 7100000010 HC PHASE II RECOVERY - FIRST 15 MIN: Performed by: INTERNAL MEDICINE

## 2024-09-30 PROCEDURE — 2500000003 HC RX 250 WO HCPCS: Performed by: NURSE ANESTHETIST, CERTIFIED REGISTERED

## 2024-09-30 PROCEDURE — 6360000002 HC RX W HCPCS

## 2024-09-30 PROCEDURE — 85025 COMPLETE CBC W/AUTO DIFF WBC: CPT

## 2024-09-30 PROCEDURE — 36415 COLL VENOUS BLD VENIPUNCTURE: CPT

## 2024-09-30 PROCEDURE — 3700000000 HC ANESTHESIA ATTENDED CARE: Performed by: INTERNAL MEDICINE

## 2024-09-30 PROCEDURE — 6370000000 HC RX 637 (ALT 250 FOR IP)

## 2024-09-30 PROCEDURE — 2580000003 HC RX 258

## 2024-09-30 PROCEDURE — 6360000002 HC RX W HCPCS: Performed by: HOSPITALIST

## 2024-09-30 RX ORDER — DOXYCYCLINE HYCLATE 100 MG
100 TABLET ORAL 2 TIMES DAILY
Qty: 10 TABLET | Refills: 0
Start: 2024-09-30 | End: 2024-10-07

## 2024-09-30 RX ORDER — PROPOFOL 10 MG/ML
INJECTION, EMULSION INTRAVENOUS
Status: DISCONTINUED | OUTPATIENT
Start: 2024-09-30 | End: 2024-09-30 | Stop reason: SDUPTHER

## 2024-09-30 RX ORDER — LIDOCAINE HYDROCHLORIDE 10 MG/ML
INJECTION, SOLUTION EPIDURAL; INFILTRATION; INTRACAUDAL; PERINEURAL
Status: DISCONTINUED | OUTPATIENT
Start: 2024-09-30 | End: 2024-09-30 | Stop reason: SDUPTHER

## 2024-09-30 RX ADMIN — VALSARTAN 160 MG: 160 TABLET ORAL at 08:38

## 2024-09-30 RX ADMIN — PANTOPRAZOLE SODIUM 40 MG: 40 INJECTION, POWDER, FOR SOLUTION INTRAVENOUS at 08:37

## 2024-09-30 RX ADMIN — SODIUM CHLORIDE 1 G: 1 TABLET ORAL at 08:38

## 2024-09-30 RX ADMIN — DIGOXIN 125 MCG: 0.12 TABLET ORAL at 08:38

## 2024-09-30 RX ADMIN — DILTIAZEM HYDROCHLORIDE 120 MG: 120 CAPSULE, EXTENDED RELEASE ORAL at 08:38

## 2024-09-30 RX ADMIN — BUMETANIDE 1 MG: 0.25 INJECTION INTRAMUSCULAR; INTRAVENOUS at 08:37

## 2024-09-30 RX ADMIN — ISOSORBIDE MONONITRATE 30 MG: 30 TABLET, EXTENDED RELEASE ORAL at 08:38

## 2024-09-30 RX ADMIN — LIDOCAINE HYDROCHLORIDE 100 MG: 10 INJECTION, SOLUTION EPIDURAL; INFILTRATION; INTRACAUDAL; PERINEURAL at 07:25

## 2024-09-30 RX ADMIN — VANCOMYCIN HYDROCHLORIDE 1000 MG: 1 INJECTION, POWDER, LYOPHILIZED, FOR SOLUTION INTRAVENOUS at 08:37

## 2024-09-30 RX ADMIN — SODIUM CHLORIDE, PRESERVATIVE FREE 10 ML: 5 INJECTION INTRAVENOUS at 08:40

## 2024-09-30 RX ADMIN — PIPERACILLIN AND TAZOBACTAM 3375 MG: 3; .375 INJECTION, POWDER, FOR SOLUTION INTRAVENOUS at 08:32

## 2024-09-30 RX ADMIN — PROPOFOL 50 MG: 10 INJECTION, EMULSION INTRAVENOUS at 07:25

## 2024-09-30 ASSESSMENT — PAIN - FUNCTIONAL ASSESSMENT
PAIN_FUNCTIONAL_ASSESSMENT: NONE - DENIES PAIN
PAIN_FUNCTIONAL_ASSESSMENT: 0-10

## 2024-09-30 ASSESSMENT — ENCOUNTER SYMPTOMS: SHORTNESS OF BREATH: 1

## 2024-09-30 ASSESSMENT — COPD QUESTIONNAIRES: CAT_SEVERITY: SEVERE

## 2024-09-30 NOTE — CONSULTS
[place] [id number]                              CONSULTATION      PATIENT NAME: GARY KHAN              : 1935  MED REC NO: [id number]                       ROOM: [facility]  ACCOUNT NO: [id number]                       ADMIT DATE: 2024  PROVIDER: Danny Mcghee MD      CONSULT DATE: 2024    HISTORY OF PRESENT ILLNESS:  The patient is known to the practice, seen in the past.  Admitted with black-tarry stool.  He is under assisted living at this time with his wife.  He is an 88-year-old male.  He has no nausea or vomiting at this time.  No dry heave.  No dysphagia or odynophagia.  He lost about 15-20 pounds over the last few months.  Appetite is not the best at this time, but acceptable for him.  .  He does not take NSAIDs according to him.  He does take blood thinner.  His bowel movement for the last few days was black liquidy.  Has improved by now, did not warrant admission to the hospital.  He has no mucus with the stool.  He has no problem with bowel movement.   At this admission was black-tarry stool.  His H and H have dropped significantly.  There is likely peptic ulcer disease.  Shortness of breath with exertion not addressed at this time.    PAST MEDICAL HISTORY:  Significant for Afib, asthma, COPD, congestive heart failure, encephalopathy in the past, hypertension, muscular deconditioning, pneumonia, peptic ulcer disease in the past, more than 50 years ago.    PAST SURGICAL HISTORY:    1. Left abdominal wall wound debridement done by Dr. Armendariz.  2. Fracture surgery.  3. Hernia repair.  4. Exploratory laparotomy.  5. Placement of gastrostomy tube in the past.    SOCIAL HISTORY:  Quit smoking long time ago.  He said he had been drinking in the past, not lately.  He quit smoking about 40 years ago.  He used to drink alcohol about 8 to 9 years ago.    FAMILY HISTORY:  Arthritis, asthma, cancer in his mother, more

## 2024-09-30 NOTE — PRE SEDATION
4/19/16  Yes Zheng Aviles MD   SYMBICORT 160-4.5 MCG/ACT AERO INHALE 2 (TWO) PUFFS BY MOUTH TWICE DAILY 3/1/16  Yes Cesar Flores MD   diltiazem (CARDIZEM CD) 120 MG ER capsule Take 1 capsule by mouth daily 1/19/16  Yes Zheng Aviles MD   AMITIZA 24 MCG capsule TAKE ONE (1) CAPSULE BY MOUTH ONCE DAILY WITH FOOD 1/7/16  Yes Zheng Aviles MD   albuterol (PROVENTIL) (2.5 MG/3ML) 0.083% nebulizer solution Take 3 mLs by nebulization every 6 hours as needed for Wheezing  Patient taking differently: Take 3 mLs by nebulization 2 times daily 7/5/15  Yes Ramin Asif MD   atorvastatin (LIPITOR) 40 MG tablet Take 1 tablet by mouth nightly 6/2/22   Ramin Asif MD   triamcinolone (KENALOG) 0.1 % cream Apply 0.1 g topically every 12 hours as needed (rash) Apply topically 2 times daily.    Jose Marquez MD   XARELTO 20 MG TABS tablet TAKE ONE (1) TABLET BY MOUTH EACH EVENING WITH MEAL 10/20/16   Zheng Aviles MD   traZODone (DESYREL) 150 MG tablet TAKE (1) TABLET BY MOUTH NIGHTLY AS NEEDED FOR SLEEP  Patient taking differently: Take 1 tablet by mouth nightly Indications: Depression 7/27/16   Zheng Aviles MD   Lift Chair MISC by Does not apply route 1/11/16   Zheng Aviles MD   Multiple Vitamins-Minerals (CERTAVITE SENIOR/ANTIOXIDANT PO) Take by mouth    Jose Marquez MD   polyvinyl alcohol (LIQUIFILM TEARS) 1.4 % ophthalmic solution 1 drop as needed    Jose Marquez MD   latanoprost (XALATAN) 0.005 % ophthalmic solution 1 drop nightly    Jose Marquez MD   magnesium hydroxide (MILK OF MAGNESIA CONCENTRATE) 2400 MG/10ML SUSP Take 10 mLs by mouth once as needed    Jose Marquez MD   acetaminophen 650 MG TABS Take 650 mg by mouth every 4 hours as needed 7/5/15   Ramin Asif MD   guaiFENesin (ROBITUSSIN) 100 MG/5ML syrup Take 10 mLs by mouth 4 times daily as needed for Cough or Congestion

## 2024-09-30 NOTE — ANESTHESIA PRE PROCEDURE
Department of Anesthesiology  Preprocedure Note       Name:  Roberto Prescott   Age:  88 y.o.  :  1935                                          MRN:  [id number]         Date:  2024      Surgeon: Surgeon(s):  Danny Mcghee MD    Procedure: Procedure(s):  ESOPHAGOGASTRODUODENOSCOPY CONTROL HEMORRHAGE    Medications prior to admission:   Prior to Admission medications    Medication Sig Start Date End Date Taking? Authorizing Provider   isosorbide mononitrate (IMDUR) 30 MG extended release tablet Take 1 tablet by mouth daily 6/3/22  Yes Ramin Asif MD   sodium chloride 1 g tablet Take 1 tablet by mouth 3 times daily (with meals) 3/8/22  Yes Ramin Asif MD   clobetasol propionate 0.05 % GEL gel Apply 1 applicator topically daily   Yes Jose Marquez MD   LACTOBACILLUS PO Take 1 tablet by mouth 2 times daily   Yes Jose Marquez MD   Propylene Glycol (SYSTANE COMPLETE OP) Apply 1 drop to eye 4 times daily   Yes Jose Marquez MD   metoprolol tartrate (LOPRESSOR) 25 MG tablet Take 2 tablets by mouth every 12 hours 21  Yes Ramin Asif MD   candesartan (ATACAND) 16 MG tablet Take 1 tablet by mouth 2 times daily   Yes Jose Marquez MD   vitamin B-12 (CYANOCOBALAMIN) 100 MCG tablet Take 10 tablets by mouth daily   Yes Jose Marquez MD   Cholecalciferol (VITAMIN D) 50 MCG (2000 UT) CAPS capsule Take 2,000 Units by mouth daily   Yes Jose Marquez MD   pantoprazole (PROTONIX) 40 MG tablet Take 1 tablet by mouth daily   Yes Jose Marquez MD   SPIRIVA HANDIHALER 18 MCG inhalation capsule INHALE THE CONTENTS OF ONE CAPSULE BY MOUTH ONCE DAILY AS DIRECTED 12/15/16  Yes Michelle Colmenares APRN - CNP   docusate sodium (COLACE) 100 MG capsule TAKE ONE (1) CAPSULE BY MOUTH TWICE DAILY  Patient taking differently: Take 1 capsule by mouth 2 times daily 10/20/16  Yes Zheng Aviles MD   albuterol sulfate HFA (PROAIR HFA) 108 (90 BASE)

## 2024-09-30 NOTE — DISCHARGE INSTR - COC
Assisted  Bathing  Assisted  Dressing  Assisted  Toileting  Assisted  Feeding  Independent  Med Admin  Assisted  Med Delivery   whole    Wound Care Documentation and Therapy:  Wound 05/31/22 Back Lateral;Right (Active)   Number of days: 853       Wound 05/31/22 Arm Lower;Right 2 Large Skin tears (Active)   Number of days: 853       Wound 05/31/22 Elbow Right;Posterior (Active)   Number of days: 853       Wound 05/31/22 Elbow Left;Posterior small skin tear (Active)   Number of days: 853       Wound 05/31/22 Buttocks Medial (Active)   Number of days: 853        Elimination:  Continence:   Bowel: Yes  Bladder: Yes  Urinary Catheter: None   Colostomy/Ileostomy/Ileal Conduit: No       Date of Last BM: 09/29/2024      Intake/Output Summary (Last 24 hours) at 9/30/2024 1017  Last data filed at 9/30/2024 1010  Gross per 24 hour   Intake 980 ml   Output 3400 ml   Net -2420 ml     I/O last 3 completed shifts:  In: 2680.2 [P.O.:480; I.V.:677.4; IV Piggyback:1522.8]  Out: 3825 [Urine:3825]    Safety Concerns:     At Risk for Falls    Impairments/Disabilities:      None    Nutrition Therapy:  Current Nutrition Therapy:   - Oral Diet:  General    Routes of Feeding: Oral  Liquids: No Restrictions  Daily Fluid Restriction: no  Last Modified Barium Swallow with Video (Video Swallowing Test): not done    Treatments at the Time of Hospital Discharge:   Respiratory Treatments: inhaler  Oxygen Therapy:  is on oxygen at 3 L/min per nasal cannula.  Ventilator:    - No ventilator support    Rehab Therapies: na    Weight Bearing Status/Restrictions: No weight bearing restrictions  Other Medical Equipment (for information only, NOT a DME order):  wheelchair and walker  Other Treatments: na      Patient's personal belongings (please select all that are sent with patient):  Romeo    RN SIGNATURE:  Electronically signed by Loida Mchugh RN on 9/30/24 at 11:50 AM EDT    CASE MANAGEMENT/SOCIAL WORK SECTION    Inpatient Status Date:

## 2024-09-30 NOTE — PROGRESS NOTES
Hospitalist Progress Note    Patient:  Roberto GUAJARDO Music      Unit/Bed:4B-03/003-A    YOB: 1935    MRN: [id number]       Acct: [id number]     PCP: No primary care provider on file.    Date of Admission: 9/27/2024    Assessment/Plan:    Community-acquired pneumonia: Continue with broad-spectrum antibiotics.  As needed bronchodilators.  Monitor cultures.  Keep Vanco on as patient has a history of MRSA plus has abscess.  Abscess: Draining does not appear to need an I&D.  Continue with current IV antibiotics and monitor.  Melena: Patient reports several days of dark tarry stools.  Denies any bright red blood per rectum.  Hemoglobin is also on downward trend.  GI consulted plan for EGD on Monday.  Will start patient on clear liquid diet and monitor hemoglobin.  Acute hypoxemic respiratory failure: Wean O2.  Chronic hyponatremia: Monitor trend.  Leukocytosis: On downward trend.  Continue to monitor.  Paroxysmal A-fib: Continue with metoprolol diltiazem.  Xarelto on hold due to melena.  Will need to follow-up with cardiology as outpatient to be evaluated for possible watchman's.  Depression: Continue trazodone  Advance care plan: DNR CCA        Subjective (past 24 hours):   Patient seen and examined at bedside.  Reports he is feeling much better today than he was yesterday.  He states his back has been draining and feels better.  Reports he has been having black stools for the last several days.  Patient still with productive cough      Medications:  Reviewed    Infusion Medications    sodium chloride       Scheduled Medications    sodium chloride flush  5-40 mL IntraVENous 2 times per day    albuterol  2.5 mg Nebulization BID    atorvastatin  40 mg Oral Nightly    valsartan  160 mg Oral 2 times per day    dilTIAZem  120 mg Oral Daily    isosorbide mononitrate  30 mg Oral Daily    digoxin  125 mcg Oral Daily    metoprolol tartrate  50 mg Oral Q12H    sodium chloride  1 g Oral TID WC    tiotropium  2 puff 
  Pharmacy Note - Extended Infusion Beta-Lactam Dose Adjustment    Piperacillin/Tazobactam 3375 mg q8h extended infusion ordered for the treatment of Community acquired pneumonia. Per [facility] Extended Infusion Beta-Lactam Policy, this will be changed to 4500 mg loading dose followed by 3375 mg q8h extended infusion    Estimated Creatinine Clearance: Estimated Creatinine Clearance: 48 mL/min (A) (based on SCr of 1.4 mg/dL (H)).    Dialysis Status, DIANNE, CKD: N/A    BMI: Body mass index is 30.48 kg/m².    Rationale for Adjustment: Dose adjusted per [facility] Extended Infusion Policy based on renal function and indication. The above medication is renally eliminated and demonstrates time-dependent effects on bacterial eradication. Extended-infusion dosing strategy aims to enhance microbiologic and clinical efficacy.     Pharmacy will monitor renal function daily and adjust dose as necessary.      Please call with any questions.    Thank you,  Johanny Dorsey [facility], BCPS, BCGP  9/27/2024     10:08 PM      
[facility]   Pharmacy Pharmacokinetic Monitoring Service - Vancomycin     Roberto Prescott is a 88 y.o. male starting on vancomycin therapy for CAP. Pharmacy consulted by Dr Juarez for monitoring and adjustment.    Target Concentration: Goal AUC/ROSA 400-600 mg*hr/L    Additional Antimicrobials: Zosyn    Pertinent Laboratory Values:   Wt Readings from Last 1 Encounters:   09/27/24 104.3 kg (230 lb)     Temp Readings from Last 1 Encounters:   09/27/24 98.4 °F (36.9 °C) (Oral)     Estimated Creatinine Clearance: 48 mL/min (A) (based on SCr of 1.4 mg/dL (H)).  Recent Labs     09/27/24  1805   CREATININE 1.4*   BUN 25*   WBC 16.3*     Pertinent Cultures:  Date Source Results   9/27/2024 MRSA    9/27/2024 Blood x 2    MRSA Nasal Swab: was ordered by provider, awaiting results.    Plan:  Dosing recommendations based on Bayesian software  Start vancomycin 2500mg x 1 then 1250mg q24h  Anticipated AUC of 508 and trough concentration of 15.2 at steady state  Renal labs as indicated   Vancomycin concentration ordered for 9/29/2024 @ 0600   Pharmacy will continue to monitor patient and adjust therapy as indicated    Thank you for the consult,  Johanny Dorsey RPmora, BCPS, BCGP  9/27/2024     10:40 PM      
[facility]   Pharmacy Pharmacokinetic Monitoring Service - Vancomycin    Indication: CAP  Target Concentration: Goal AUC/ROSA 400-600 mg*hr/L  Day of Therapy: 3  Additional Antimicrobials: Zosyn    Pertinent Laboratory Values:   Wt Readings from Last 1 Encounters:   09/28/24 110.2 kg (242 lb 15.2 oz)     Temp Readings from Last 1 Encounters:   09/29/24 98.6 °F (37 °C) (Oral)     Estimated Creatinine Clearance: 69 mL/min (based on SCr of 1 mg/dL).  Recent Labs     09/28/24  0333 09/29/24  0454   CREATININE 1.3* 1.0   BUN 21 16   WBC 12.6* 9.9     Pertinent Cultures:  Date Source Results   9/27/2024 MRSA positive   9/27/2024 Blood x 2 ngtd   MRSA Nasal Swab: showed MRSA positive result on 9/27/2024    Recent vancomycin administrations                     vancomycin (VANCOCIN) 1250 mg in sodium chloride 0.9% 250 mL IVPB (mg) 1,250 mg New Bag 09/28/24 2023    vancomycin (VANCOCIN) 2500 mg in sodium chloride 0.9 % 500 mL IVPB ()  Restarted 09/27/24 2255      Restarted  2233     2,500 mg New Bag  2018                    Assessment:  Date/Time Current Dose Concentration Timing of Concentration (h) AUC C trough,ss   9/29/2024 1250mg q24h 353 8 hr 31 min 353 9.7   Note: Serum concentrations collected for AUC dosing may appear elevated if collected in close proximity to the dose administered, this is not necessarily an indication of toxicity    Plan:  Current dosing regimen is sub-therapeutic due to improving renal function  Increase dose to 1000mg q12h  Repeat vancomycin concentration not ordered yet  Pharmacy will continue to monitor patient and adjust therapy as indicated    Thank you for the consult,  Johanny Dorsey RPh, ERVIN, TONIP  9/29/2024     6:02 AM      
EGD complete, photos taken, no  specimens taken , pt tolerated procedure well    Scope Number gif [id number].      
Echo was completed at bedside.  Echo was given to Dr. Comer to read.  
No follow up need per Dr. Mcghee. Okay to discharge     1123- Notified Juan, son of discharge and  time    1130- Report called to LIZZY Martinez at [facility]. All questions answered.  time 1230      
Patient admitted to endoscopy for a EGD with control of hemorrhage. Procedure verified and consent signed.  
Patient is in phase 2   .  discussed findings, plan of care, discharge instructions with pt     Report called to floor     
last 72 hours.    Imaging:  No results found for this or any previous visit.    Results for orders placed during the hospital encounter of 11/13/16    CT ABDOMEN PELVIS W IV CONTRAST Additional Contrast? Oral    Narrative  CT ABDOMEN AND PELVIS WITH CONTRAST ENHANCEMENT:    CLINICAL INFORMATION: RLQ hernia. Right groin pain.    COMPARISON: 10/13/2013    TECHNIQUE: Multiple axial 5 mm images of the abdomen, pelvis, and lung bases were obtained following the administration of oral and intravenous contrast material (OPTIRAY). Computer generated sagittal and coronal images of the abdomen and pelvis were  also reconstructed.ALL CT SCANS AT THIS FACILITY use dose modulation, iterative reconstruction, and/or weight-based dosing when appropriate to reduce radiation dose to as low as reasonably achievable.      FINDINGS:    Lung bases: Unremarkable. No infiltrates. No effusions. No lung nodules.    Liver: Liver unremarkable. Gallbladder unremarkable    Pancreas, spleen and adrenal glands: Unremarkable    Kidneys:  There are a few small cysts in both kidneys. Kidneys otherwise unremarkable.    Bowel/Peritoneum: No abnormally dilated loops are seen. Small umbilical hernia. Small multiple of small bowel passes into the hernia but is not incarcerated. Several sigmoid diverticula but no evidence for diverticulitis. No free air. No free fluid in  the abdomen. No abnormally enlarged para-aortic lymph nodes are seen.    Bones : Moderately severe multilevel degenerative changes in the lumbar spine. Mild multilevel spinal stenosis lower lumbar region. Slight retrolisthesis L5 on S1.    Pelvis: Unremarkable. No mass lesion. No free fluid. The urinary bladder is unremarkable.    Impression  No acute findings in the abdomen or pelvis. Multiple nonemergent findings as above.        **This report has been created using voice recognition software.  It may contain minor errors which are inherent in voice recognition technology.**      Final 
MD Titi on 9/29/2024 at 12:15 PM

## 2024-09-30 NOTE — ANESTHESIA POSTPROCEDURE EVALUATION
Department of Anesthesiology  Postprocedure Note    Patient: Roberto Prescott  MRN: [id number]  YOB: 1935  Date of evaluation: 9/30/2024    Procedure Summary       Date: 09/30/24 Room / Location: [place] [address] / [facility]    Anesthesia Start: 0733 Anesthesia Stop: 0741    Procedure: ESOPHAGOGASTRODUODENOSCOPY CONTROL HEMORRHAGE Diagnosis: Gastrointestinal hemorrhage with hematemesis    Surgeons: Danny Mcghee MD Responsible Provider: Vinnie Mcnamara DO    Anesthesia Type: MAC ASA Status: 3            Anesthesia Type: No value filed.    Елена Phase I: Елена Score: 9    Елена Phase II:      Anesthesia Post Evaluation    Patient location during evaluation: bedside  Patient participation: complete - patient participated  Level of consciousness: awake  Pain score: 0  Airway patency: patent  Nausea & Vomiting: no nausea and no vomiting  Cardiovascular status: blood pressure returned to baseline  Respiratory status: acceptable and nasal cannula  Hydration status: stable  Pain management: satisfactory to patient        No notable events documented.

## 2024-09-30 NOTE — PLAN OF CARE
Problem: Chronic Conditions and Co-morbidities  Goal: Patient's chronic conditions and co-morbidity symptoms are monitored and maintained or improved  Outcome: Progressing  Flowsheets (Taken 9/28/2024 0716)  Care Plan - Patient's Chronic Conditions and Co-Morbidity Symptoms are Monitored and Maintained or Improved: Monitor and assess patient's chronic conditions and comorbid symptoms for stability, deterioration, or improvement     Problem: Discharge Planning  Goal: Discharge to home or other facility with appropriate resources  Outcome: Progressing  Flowsheets (Taken 9/28/2024 0716)  Discharge to home or other facility with appropriate resources: Identify barriers to discharge with patient and caregiver     Problem: Skin/Tissue Integrity  Goal: Absence of new skin breakdown  Description: 1.  Monitor for areas of redness and/or skin breakdown  2.  Assess vascular access sites hourly  3.  Every 4-6 hours minimum:  Change oxygen saturation probe site  4.  Every 4-6 hours:  If on nasal continuous positive airway pressure, respiratory therapy assess nares and determine need for appliance change or resting period.  Outcome: Progressing     Problem: ABCDS Injury Assessment  Goal: Absence of physical injury  Outcome: Progressing  Flowsheets (Taken 9/28/2024 0716)  Absence of Physical Injury: Implement safety measures based on patient assessment     Problem: Safety - Adult  Goal: Free from fall injury  Outcome: Progressing  Flowsheets (Taken 9/28/2024 0716)  Free From Fall Injury: Instruct family/caregiver on patient safety     
  Problem: Chronic Conditions and Co-morbidities  Goal: Patient's chronic conditions and co-morbidity symptoms are monitored and maintained or improved  Outcome: Progressing  Flowsheets (Taken 9/29/2024 0337)  Care Plan - Patient's Chronic Conditions and Co-Morbidity Symptoms are Monitored and Maintained or Improved: Monitor and assess patient's chronic conditions and comorbid symptoms for stability, deterioration, or improvement     Problem: Discharge Planning  Goal: Discharge to home or other facility with appropriate resources  Outcome: Progressing  Flowsheets (Taken 9/29/2024 0337)  Discharge to home or other facility with appropriate resources: Identify barriers to discharge with patient and caregiver     Problem: Skin/Tissue Integrity  Goal: Absence of new skin breakdown  Description: 1.  Monitor for areas of redness and/or skin breakdown  2.  Assess vascular access sites hourly  3.  Every 4-6 hours minimum:  Change oxygen saturation probe site  4.  Every 4-6 hours:  If on nasal continuous positive airway pressure, respiratory therapy assess nares and determine need for appliance change or resting period.  Outcome: Progressing     Problem: ABCDS Injury Assessment  Goal: Absence of physical injury  Outcome: Progressing  Flowsheets (Taken 9/29/2024 0337)  Absence of Physical Injury: Implement safety measures based on patient assessment     Problem: Safety - Adult  Goal: Free from fall injury  Outcome: Progressing  Flowsheets (Taken 9/29/2024 0337)  Free From Fall Injury: Instruct family/caregiver on patient safety          
  Problem: Respiratory - Adult  Goal: Clear lung sounds  9/28/2024 2042 by Yong Dillard RCP  Outcome: Progressing   Patient mutually agrees with goal of care   
  Problem: Respiratory - Adult  Goal: Clear lung sounds  9/29/2024 0936 by Tari Flanagan, RCP  Outcome: Progressing  Note: Txs to help improve lung aeration. Patient mutually agreed on goals.       
  Problem: Respiratory - Adult  Goal: Clear lung sounds  9/29/2024 2051 by Elina Hill RCP  Outcome: Progressing   Pt mutually agrees upon goals.   
  Problem: Respiratory - Adult  Goal: Clear lung sounds  Outcome: Progressing   Continue inhaled medications to improve breath sounds.  Pt agrees with plan of care  
Progressing  Flowsheets (Taken 9/28/2024 9365)  Free From Fall Injury: Instruct family/caregiver on patient safety

## 2024-09-30 NOTE — DISCHARGE SUMMARY
Discharge Summary    Patient:  Roberto Prescott  YOB: 1935    MRN: [id number]   Acct: [id number]    Primary Care Physician: No primary care provider on file.    Admit date:  9/27/2024    Discharge date:  9/30/2024       Discharge Diagnoses:   Sepsis (HCC)  Principal Problem:    Sepsis (HCC)  Resolved Problems:    * No resolved hospital problems. *        Admitted for: (HPI)  Roberto Prescott is a 88 y.o. male with PMHx of HFpEF, A-fib, asthma, COPD, HTN, HLD who presents to [facility] with shortness of breath.  Patient reports progressively worsening shortness of breath over the last 2 days.  Per SNF patient was satting in the 80s on room air.  Patient does not normally use supplemental oxygen.  Patient reports he took his albuterol inhaler a few times which did not alleviate symptoms slightly.  He also notes productive cough with yellow sputum.  Patient denies any chest pain, abdominal pain, shake, fever, chills, dysuria.  Patient has a right sided back abscess and was started on Bactrim 9/26.  Patient also reports over the last 3-4 days has been having diarrhea with black stools.  He currently takes iron supplements.  He denies any NSAID use and stating that he uses Tylenol as needed.  Patient reports he had a prior EGD many years ago with positive PUD, however he is not sure when this was.  Patient denies ever having colonoscopy performed.      ED course: Labs: Sodium 123, potassium 4.5, chloride 93, CO2 20, BUN 25, creatinine 1.4, glucose 140, calcium 8.2.  Procalcitonin 0.19.  Lactic acid 3.4-2 0.1-3.0.  BNP 3978.  Troponin 195.  WBC 16.8, hemoglobin 8.7, hematocrit 25.8, platelets 220.  COVID/flu negative.  CXR probable left lingula pneumonia, small left pleural effusion.  Patient given dose of Unasyn, azithromycin, vancomycin.  Patient given sepsis bolus of normal saline.    Hospital Course:  88-year-old male admitted with complaints of shortness of breath and drainage

## 2024-09-30 NOTE — PROCEDURES
[address] [address]                             PROCEDURE NOTE      PATIENT NAME: GARY KHAN              : 1935  MED REC NO: [id number]                       ROOM: [facility]  ACCOUNT NO: [id number]                       ADMIT DATE: 2024  PROVIDER: Paxton Mcghee MD      DATE OF PROCEDURE:  2024    SURGEON:  Paxton Mcghee MD    INDICATIONS:  The patient has history of melena, GI bleed, acute GI blood loss anemia.  Plan today for EGD to evaluate.    ASA CLASSIFICATION:  III.    ESTIMATED BLOOD LOSS:  None.    DESCRIPTION OF PROCEDURE:  The patient was brought to GI lab.  Consent obtained.  Risks involved with the procedure explained to the patient.  Informed consent obtained.  Patient was monitored during procedure with pulse oximetry, blood pressure monitoring, oxygen by nasal cannula.  Sedation by incremental doses of IV propofol given by Anesthesia Service to achieve monitored anesthesia care.  For ASA classification and medication given during procedure, please see Anesthesia note.    Procedure performed is EGD.  A standard video upper scope advanced under direct vision from the oral cavity up to the duodenum.  The esophagus appeared normal.  No erosions or ulcers seen.  Scope was advanced to the stomach and retroflexed.  Examination of the cardia revealed normal cardia.  Antrum and body appeared normal.  Pylorus appeared normal.  Duodenum appeared normal.  No residual ulcers seen.  No biopsy obtained.  Scope withdrawn with no immediate complications.    IMPRESSION:  Normal esophagogastroduodenoscopy.    PLAN:  Resume general diet.  No plan for colonoscopy at this time as the patient clinically appeared to be stable.          PAXTON MCGHEE MD      D:  2024 07:46:05     T:  2024 10:52:26     AT/AQS  Job #:  [id number]     Doc#:  [id number]    CC:   Hospitalist Service

## 2024-09-30 NOTE — BRIEF OP NOTE
Brief Postoperative Note      Patient: Roberto Prescott  YOB: 1935  MRN: [id number]    Date of Procedure: 9/30/2024    Pre-Op Diagnosis Codes:      * Gastrointestinal hemorrhage with hematemesis [K92.0]    Post-Op Diagnosis: Normal EGD no GI bleed no biopsy obtained       Procedure(s):  ESOPHAGOGASTRODUODENOSCOPY CONTROL HEMORRHAGE    Surgeon(s):  Danny Mcghee MD    Assistant:  * No surgical staff found *    Anesthesia: Monitor Anesthesia Care    Estimated Blood Loss (mL): none    Complications: None    Specimens:   * No specimens in log *    Implants:  * No implants in log *      Drains: * No LDAs found *    Findings: Normal EGD no GI bleed no biopsy obtained,      Infection Present At Time Of Surgery (PATOS) (choose all levels that have infection present):  No infection present  Other Findings:      Electronically signed by Danny Mcghee MD on 9/30/2024 at 7:42 AM

## 2024-09-30 NOTE — CARE COORDINATION
9/30/24, 11:39 AM EDT    Patient goals/plan/ treatment preferences discussed by  and .  Patient goals/plan/ treatment preferences reviewed with patient/ family.  Patient/ family verbalize understanding of discharge plan and are in agreement with goal/plan/treatment preferences.  Understanding was demonstrated using the teach back method.  AVS provided by RN at time of discharge, which includes all necessary medical information pertaining to the patients current course of illness, treatment, post-discharge goals of care, and treatment preferences.     Services At/After Discharge: Skilled Nursing Facility (SNF)    Planning return to [facility], [place], mike spoke with pt, he is agreeable to returning today. [facility] W/C transport.  MIKE called Julio C PAULINO at [facility] and left message.  Mike called [facility] and set transport up for 12:30  today. MIKE faxed forms to [facility].   Mike spoke with Julio C PAULINO at [facility], [place].  Nurse called report.         
Case Management Assessment Initial Evaluation    Date/Time of Evaluation: 2024 8:07 AM  Assessment Completed by: Marycruz Shabazz RN    If patient is discharged prior to next notation, then this note serves as note for discharge by case management.    Patient Name: Roberto Prescott                   YOB: 1935  Diagnosis: Shortness of breath [R06.02]  Septicemia (HCC) [A41.9]  Elevated troponin [R79.89]  Sepsis (HCC) [A41.9]  Acute renal failure, unspecified acute renal failure type (HCC) [N17.9]  Pneumonia of left lung due to infectious organism, unspecified part of lung [J18.9]                   Date / Time: 2024  5:16 PM  Location: [facility]/NONE     Patient Admission Status: Inpatient   Readmission Risk Low 0-14, Mod 15-19), High > 20: Readmission Risk Score: 18.2    Current PCP: No primary care provider on file.  Health Care Decision Makers:   Primary Decision Maker: Juan Prescott - Child - [phone]    Additional Case Management Notes: Presented to ED with c/o increased SOB for 2 days. Sats in the 80's on room air and placed on O2 at SNF. Na+ 123. Creat 1.4. Admitted to . GI consulted for melena. EGD today was normal. Received IV bumex. Received IV ATBs for pneumonia. Blood cultures were negative. Respiratory culture sent. Rapid flu & COVID 19 were negative. +MRSA nares. DNRCCA. Ox4. Afebrile. Sats 100% on 1L O2. Na+ 126, hgb 8.3. Discharging back to SNF today.     Procedures:    Echo with EF 50-55%   EGD: Normal    Imagin/27 CXR: 1. Probable left lingular pneumonia  2. Small left pleural effusion.    Patient Goals/Plan/Treatment Preferences: Return to [facility]. No precert. SW on case.           
DME:    Patient expects to discharge to: Skilled nursing facility  Plan for transportation at discharge:      Financial  Payor: MEDICARE / Plan: MEDICARE PART A AND B / Product Type: *No Product type* /     Potential assistance Purchasing Medications: No

## 2024-10-02 LAB
BACTERIA BLD AEROBE CULT: NORMAL
BACTERIA BLD AEROBE CULT: NORMAL

## 2024-11-11 ENCOUNTER — NURSE ONLY (OUTPATIENT)
Dept: CARDIOLOGY CLINIC | Age: 89
End: 2024-11-11

## 2024-11-11 DIAGNOSIS — Z95.0 PACEMAKER: Primary | ICD-10-CM

## 2025-01-14 ENCOUNTER — NURSE ONLY (OUTPATIENT)
Dept: CARDIOLOGY CLINIC | Age: 89
End: 2025-01-14

## 2025-01-14 DIAGNOSIS — Z95.0 PACEMAKER: Primary | ICD-10-CM

## 2025-03-20 NOTE — PATIENT INSTRUCTIONS
..How was your appointment at the device clinic today?  Did one of pacemaker nurses made your day?   Is there something we can do to make your appointment better?    Please let us know about it on the survey you receive in the mail or let us know on a mychart message!    Heck, we even love Post it notes!  We appreciate you and want to make your appointment with us the best we can!       Thank you!  Mi Madsen and Morgan

## 2025-03-26 ENCOUNTER — CLINICAL SUPPORT (OUTPATIENT)
Dept: CARDIOLOGY CLINIC | Age: 89
End: 2025-03-26

## 2025-03-26 DIAGNOSIS — Z95.0 PACEMAKER: Primary | ICD-10-CM

## 2025-05-06 ENCOUNTER — OFFICE VISIT (OUTPATIENT)
Dept: CARDIOLOGY CLINIC | Age: 89
End: 2025-05-06
Payer: MEDICARE

## 2025-05-06 ENCOUNTER — CLINICAL SUPPORT (OUTPATIENT)
Dept: CARDIOLOGY CLINIC | Age: 89
End: 2025-05-06

## 2025-05-06 VITALS
HEIGHT: 73 IN | WEIGHT: 234 LBS | SYSTOLIC BLOOD PRESSURE: 86 MMHG | HEART RATE: 67 BPM | BODY MASS INDEX: 31.01 KG/M2 | DIASTOLIC BLOOD PRESSURE: 58 MMHG

## 2025-05-06 DIAGNOSIS — E78.5 HYPERLIPIDEMIA, UNSPECIFIED HYPERLIPIDEMIA TYPE: ICD-10-CM

## 2025-05-06 DIAGNOSIS — I50.32 CHRONIC DIASTOLIC HEART FAILURE (HCC): ICD-10-CM

## 2025-05-06 DIAGNOSIS — I49.5 SSS (SICK SINUS SYNDROME) (HCC): ICD-10-CM

## 2025-05-06 DIAGNOSIS — Z95.0 S/P CARDIAC PACEMAKER PROCEDURE: Primary | ICD-10-CM

## 2025-05-06 DIAGNOSIS — Z95.0 PACEMAKER: Primary | ICD-10-CM

## 2025-05-06 DIAGNOSIS — I10 PRIMARY HYPERTENSION: ICD-10-CM

## 2025-05-06 DIAGNOSIS — I34.2 NONRHEUMATIC MITRAL VALVE STENOSIS: ICD-10-CM

## 2025-05-06 DIAGNOSIS — I95.2 HYPOTENSION DUE TO DRUGS: ICD-10-CM

## 2025-05-06 DIAGNOSIS — I48.21 PERMANENT ATRIAL FIBRILLATION (HCC): ICD-10-CM

## 2025-05-06 DIAGNOSIS — I35.0 NONRHEUMATIC AORTIC VALVE STENOSIS: ICD-10-CM

## 2025-05-06 PROCEDURE — 1036F TOBACCO NON-USER: CPT | Performed by: INTERNAL MEDICINE

## 2025-05-06 PROCEDURE — 1159F MED LIST DOCD IN RCRD: CPT | Performed by: INTERNAL MEDICINE

## 2025-05-06 PROCEDURE — G8417 CALC BMI ABV UP PARAM F/U: HCPCS | Performed by: INTERNAL MEDICINE

## 2025-05-06 PROCEDURE — 99205 OFFICE O/P NEW HI 60 MIN: CPT | Performed by: INTERNAL MEDICINE

## 2025-05-06 PROCEDURE — 1123F ACP DISCUSS/DSCN MKR DOCD: CPT | Performed by: INTERNAL MEDICINE

## 2025-05-06 PROCEDURE — G8427 DOCREV CUR MEDS BY ELIG CLIN: HCPCS | Performed by: INTERNAL MEDICINE

## 2025-05-06 PROCEDURE — 93000 ELECTROCARDIOGRAM COMPLETE: CPT | Performed by: INTERNAL MEDICINE

## 2025-05-06 NOTE — PROGRESS NOTES
ventricle size is normal. Normal   wall thickness. Normal wall motion.    Aortic Valve: Moderate sclerosis of the aortic valve cusps. Mild to   moderate stenosis of the aortic valve. AV mean gradient is 11 mmHg. AV   peak gradient is 18 mmHg. AV area by continuity VTI is 1.5 cm2.    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   Moderately calcified, at the anterior and posterior leaflets. Mild   regurgitation. Mild to moderate stenosis noted. MV mean gradient is 3   mmHg. MV area by continuity equation is 1.4 cm2.    Tricuspid Valve: Mild regurgitation.    Left Atrium: Left atrium is severely dilated.    Right Atrium: Right atrium is moderately dilated.    Image quality is technically difficult. Technically difficult study,   technically difficult study due to patient's body habitus and procedure   performed with the patient in a supine position.         Stress Lin: No results found for this or any previous visit.    Stress Exercise: No results found for this or any previous visit.    Cardiac Monitor: No results found for this or any previous visit.     Cath: No results found for this or any previous visit.      Chest Xray: 9/27/24  FINDINGS:  Left-sided cardiac pacemaker.   Cardiomegaly, stable.  Hazy airspace opacity along the left heart border.  Blunting of the left costophrenic angle. No pneumothorax.  No acute fracture identified.     IMPRESSION:  1. Probable left lingular pneumonia  2. Small left pleural effusion.    CTA Heart: No results found for this or any previous visit.     CTA Chest: No results found for this or any previous visit.     JUSTINE: No results found for this or any previous visit.      ASSESSMENT/PLAN:      1. Permanent atrial fibrillation (HCC)  Probable permanent atrial fibrillation.  Continue current medications.  [id number] score is 4-5.  Continue Xarelto.  Continue other cardiac meds.  - EKG 12 lead    2. SSS (sick sinus syndrome) (formerly [facility])  Pacemaker nearing [facility].  He is

## 2025-05-06 NOTE — PATIENT INSTRUCTIONS
You may receive a survey regarding the care you received during your visit. We encourage you to complete and return your survey, as your input is valuable to us. We hope you will choose us in the future for your healthcare needs. Thank you!    Your Medical Assistant today: Harley  Thank you for coming to our office! It was a pleasure to serve you.

## 2025-05-10 ENCOUNTER — APPOINTMENT (OUTPATIENT)
Dept: GENERAL RADIOLOGY | Age: 89
End: 2025-05-10
Payer: MEDICARE

## 2025-05-10 ENCOUNTER — HOSPITAL ENCOUNTER (INPATIENT)
Age: 89
LOS: 6 days | Discharge: NURSING, REHAB OR LONG-TERM CARE | End: 2025-05-16
Attending: EMERGENCY MEDICINE | Admitting: INTERNAL MEDICINE
Payer: MEDICARE

## 2025-05-10 DIAGNOSIS — D64.9 SYMPTOMATIC ANEMIA: Primary | ICD-10-CM

## 2025-05-10 DIAGNOSIS — I48.0 PAROXYSMAL ATRIAL FIBRILLATION (HCC): ICD-10-CM

## 2025-05-10 DIAGNOSIS — R79.89 ELEVATED TROPONIN: ICD-10-CM

## 2025-05-10 PROBLEM — K92.2 GI BLEED: Status: ACTIVE | Noted: 2025-05-10

## 2025-05-10 LAB
ABO GROUP BLD: NORMAL
ALBUMIN SERPL BCG-MCNC: 3 G/DL (ref 3.4–4.9)
ALP SERPL-CCNC: 71 U/L (ref 40–129)
ALT SERPL W/O P-5'-P-CCNC: 15 U/L (ref 10–50)
ANION GAP SERPL CALC-SCNC: 10 MEQ/L (ref 8–16)
AST SERPL-CCNC: 21 U/L (ref 10–50)
BACTERIA URNS QL MICRO: ABNORMAL /HPF
BASOPHILS ABSOLUTE: 0.1 THOU/MM3 (ref 0–0.1)
BASOPHILS NFR BLD AUTO: 0.3 %
BILIRUB CONJ SERPL-MCNC: 0.2 MG/DL (ref 0–0.2)
BILIRUB SERPL-MCNC: 0.3 MG/DL (ref 0.3–1.2)
BILIRUB UR QL STRIP.AUTO: NEGATIVE
BUN SERPL-MCNC: 28 MG/DL (ref 8–23)
BURR CELLS BLD QL SMEAR: ABNORMAL
CALCIUM SERPL-MCNC: 8.4 MG/DL (ref 8.8–10.2)
CASTS #/AREA URNS LPF: ABNORMAL /LPF
CASTS 2: ABNORMAL /LPF
CHARACTER UR: CLEAR
CHLORIDE SERPL-SCNC: 103 MEQ/L (ref 98–111)
CO2 SERPL-SCNC: 18 MEQ/L (ref 22–29)
COLOR, UA: YELLOW
CREAT SERPL-MCNC: 1.3 MG/DL (ref 0.7–1.2)
CRYSTALS URNS MICRO: ABNORMAL
DEPRECATED RDW RBC AUTO: 52.4 FL (ref 35–45)
EOSINOPHIL NFR BLD AUTO: 0.8 %
EOSINOPHILS ABSOLUTE: 0.1 THOU/MM3 (ref 0–0.4)
EPITHELIAL CELLS, UA: ABNORMAL /HPF
ERYTHROCYTE [DISTWIDTH] IN BLOOD BY AUTOMATED COUNT: 15.1 % (ref 11.5–14.5)
FOLATE SERPL-MCNC: > 20 NG/ML (ref 4.6–34.8)
GFR SERPL CREATININE-BSD FRML MDRD: 53 ML/MIN/1.73M2
GLUCOSE SERPL-MCNC: 142 MG/DL (ref 74–109)
GLUCOSE UR QL STRIP.AUTO: NEGATIVE MG/DL
HCT VFR BLD AUTO: 17.2 % (ref 42–52)
HCT VFR BLD AUTO: 24 % (ref 42–52)
HCT VFR BLD AUTO: 24.3 % (ref 42–52)
HEMOCCULT STL QL: NEGATIVE
HGB BLD-MCNC: 5.5 GM/DL (ref 14–18)
HGB BLD-MCNC: 7.7 GM/DL (ref 14–18)
HGB BLD-MCNC: 8 GM/DL (ref 14–18)
HGB UR QL STRIP.AUTO: NEGATIVE
IAT IGG-SP REAG SERPL QL: NORMAL
IMM GRANULOCYTES # BLD AUTO: 0.19 THOU/MM3 (ref 0–0.07)
IMM GRANULOCYTES NFR BLD AUTO: 1.1 %
INR PPP: 2.8 (ref 0.85–1.13)
IRON SERPL-MCNC: 17 UG/DL (ref 61–157)
KETONES UR QL STRIP.AUTO: NEGATIVE
LACTATE SERPL-SCNC: 2.6 MMOL/L (ref 0.5–2)
LACTIC ACID, SEPSIS: 2.1 MMOL/L (ref 0.5–1.9)
LIPASE SERPL-CCNC: 21 U/L (ref 13–60)
LYMPHOCYTES ABSOLUTE: 1 THOU/MM3 (ref 1–4.8)
LYMPHOCYTES NFR BLD AUTO: 5.6 %
MCH RBC QN AUTO: 30.9 PG (ref 26–33)
MCHC RBC AUTO-ENTMCNC: 32 GM/DL (ref 32.2–35.5)
MCV RBC AUTO: 96.6 FL (ref 80–94)
MISCELLANEOUS 2: ABNORMAL
MONOCYTES ABSOLUTE: 1.2 THOU/MM3 (ref 0.4–1.3)
MONOCYTES NFR BLD AUTO: 7 %
NEUTROPHILS ABSOLUTE: 14.6 THOU/MM3 (ref 1.8–7.7)
NEUTROPHILS NFR BLD AUTO: 85.2 %
NITRITE UR QL STRIP: NEGATIVE
NRBC BLD AUTO-RTO: 0 /100 WBC
NT-PROBNP SERPL IA-MCNC: 2975 PG/ML (ref 0–449)
OSMOLALITY SERPL CALC.SUM OF ELEC: 270.5 MOSMOL/KG (ref 275–300)
PATHOLOGIST REVIEW: ABNORMAL
PH UR STRIP.AUTO: 5.5 [PH] (ref 5–9)
PLATELET # BLD AUTO: 254 THOU/MM3 (ref 130–400)
PLATELET BLD QL SMEAR: ADEQUATE
PMV BLD AUTO: 10.8 FL (ref 9.4–12.4)
POLYCHROMASIA BLD QL SMEAR: ABNORMAL
POTASSIUM SERPL-SCNC: 5.4 MEQ/L (ref 3.5–5.2)
PROT SERPL-MCNC: 5.3 G/DL (ref 6.4–8.3)
PROT UR STRIP.AUTO-MCNC: NEGATIVE MG/DL
RBC # BLD AUTO: 1.78 MILL/MM3 (ref 4.7–6.1)
RBC URINE: ABNORMAL /HPF
REASON FOR REJECTION: NORMAL
REJECTED TEST: NORMAL
RENAL EPI CELLS #/AREA URNS HPF: ABNORMAL /[HPF]
RH BLD: NORMAL
SCAN OF BLOOD SMEAR: NORMAL
SODIUM SERPL-SCNC: 131 MEQ/L (ref 135–145)
SP GR UR REFRACT.AUTO: 1.01 (ref 1–1.03)
TROPONIN, HIGH SENSITIVITY: 159 NG/L (ref 0–12)
UROBILINOGEN, URINE: 0.2 EU/DL (ref 0–1)
VIT B12 SERPL-MCNC: 566 PG/ML (ref 232–1245)
WBC # BLD AUTO: 17.1 THOU/MM3 (ref 4.8–10.8)
WBC #/AREA URNS HPF: ABNORMAL /HPF
WBC #/AREA URNS HPF: ABNORMAL /[HPF]
YEAST LIKE FUNGI URNS QL MICRO: ABNORMAL

## 2025-05-10 PROCEDURE — 80076 HEPATIC FUNCTION PANEL: CPT

## 2025-05-10 PROCEDURE — 71045 X-RAY EXAM CHEST 1 VIEW: CPT

## 2025-05-10 PROCEDURE — 6370000000 HC RX 637 (ALT 250 FOR IP): Performed by: INTERNAL MEDICINE

## 2025-05-10 PROCEDURE — 85610 PROTHROMBIN TIME: CPT

## 2025-05-10 PROCEDURE — 83540 ASSAY OF IRON: CPT

## 2025-05-10 PROCEDURE — 85014 HEMATOCRIT: CPT

## 2025-05-10 PROCEDURE — 86901 BLOOD TYPING SEROLOGIC RH(D): CPT

## 2025-05-10 PROCEDURE — 93005 ELECTROCARDIOGRAM TRACING: CPT | Performed by: EMERGENCY MEDICINE

## 2025-05-10 PROCEDURE — 30233N1 TRANSFUSION OF NONAUTOLOGOUS RED BLOOD CELLS INTO PERIPHERAL VEIN, PERCUTANEOUS APPROACH: ICD-10-PCS | Performed by: INTERNAL MEDICINE

## 2025-05-10 PROCEDURE — 83690 ASSAY OF LIPASE: CPT

## 2025-05-10 PROCEDURE — 6360000002 HC RX W HCPCS: Performed by: INTERNAL MEDICINE

## 2025-05-10 PROCEDURE — 99223 1ST HOSP IP/OBS HIGH 75: CPT | Performed by: INTERNAL MEDICINE

## 2025-05-10 PROCEDURE — 2060000000 HC ICU INTERMEDIATE R&B

## 2025-05-10 PROCEDURE — 81001 URINALYSIS AUTO W/SCOPE: CPT

## 2025-05-10 PROCEDURE — 36415 COLL VENOUS BLD VENIPUNCTURE: CPT

## 2025-05-10 PROCEDURE — 80048 BASIC METABOLIC PNL TOTAL CA: CPT

## 2025-05-10 PROCEDURE — 82272 OCCULT BLD FECES 1-3 TESTS: CPT

## 2025-05-10 PROCEDURE — 36430 TRANSFUSION BLD/BLD COMPNT: CPT

## 2025-05-10 PROCEDURE — 2580000003 HC RX 258

## 2025-05-10 PROCEDURE — 82746 ASSAY OF FOLIC ACID SERUM: CPT

## 2025-05-10 PROCEDURE — 86900 BLOOD TYPING SEROLOGIC ABO: CPT

## 2025-05-10 PROCEDURE — 85025 COMPLETE CBC W/AUTO DIFF WBC: CPT

## 2025-05-10 PROCEDURE — 83605 ASSAY OF LACTIC ACID: CPT

## 2025-05-10 PROCEDURE — 6360000002 HC RX W HCPCS

## 2025-05-10 PROCEDURE — 86923 COMPATIBILITY TEST ELECTRIC: CPT

## 2025-05-10 PROCEDURE — 86885 COOMBS TEST INDIRECT QUAL: CPT

## 2025-05-10 PROCEDURE — 82607 VITAMIN B-12: CPT

## 2025-05-10 PROCEDURE — P9016 RBC LEUKOCYTES REDUCED: HCPCS

## 2025-05-10 PROCEDURE — 96374 THER/PROPH/DIAG INJ IV PUSH: CPT

## 2025-05-10 PROCEDURE — 85018 HEMOGLOBIN: CPT

## 2025-05-10 PROCEDURE — 84484 ASSAY OF TROPONIN QUANT: CPT

## 2025-05-10 PROCEDURE — 99285 EMERGENCY DEPT VISIT HI MDM: CPT

## 2025-05-10 PROCEDURE — 83880 ASSAY OF NATRIURETIC PEPTIDE: CPT

## 2025-05-10 RX ORDER — DIGOXIN 125 MCG
125 TABLET ORAL DAILY
Status: DISCONTINUED | OUTPATIENT
Start: 2025-05-10 | End: 2025-05-15

## 2025-05-10 RX ORDER — ACETAMINOPHEN 325 MG/1
650 TABLET ORAL EVERY 4 HOURS PRN
Status: DISCONTINUED | OUTPATIENT
Start: 2025-05-10 | End: 2025-05-16 | Stop reason: HOSPADM

## 2025-05-10 RX ORDER — ALBUTEROL SULFATE 0.83 MG/ML
2.5 SOLUTION RESPIRATORY (INHALATION) EVERY 6 HOURS PRN
Status: DISCONTINUED | OUTPATIENT
Start: 2025-05-10 | End: 2025-05-16 | Stop reason: HOSPADM

## 2025-05-10 RX ORDER — TRAZODONE HYDROCHLORIDE 150 MG/1
150 TABLET ORAL NIGHTLY
COMMUNITY

## 2025-05-10 RX ORDER — METOPROLOL TARTRATE 50 MG
50 TABLET ORAL EVERY 12 HOURS
Status: DISCONTINUED | OUTPATIENT
Start: 2025-05-10 | End: 2025-05-16 | Stop reason: HOSPADM

## 2025-05-10 RX ORDER — 0.9 % SODIUM CHLORIDE 0.9 %
500 INTRAVENOUS SOLUTION INTRAVENOUS ONCE
Status: COMPLETED | OUTPATIENT
Start: 2025-05-10 | End: 2025-05-10

## 2025-05-10 RX ORDER — LATANOPROST 50 UG/ML
1 SOLUTION/ DROPS OPHTHALMIC NIGHTLY
Status: DISCONTINUED | OUTPATIENT
Start: 2025-05-10 | End: 2025-05-16 | Stop reason: HOSPADM

## 2025-05-10 RX ORDER — PANTOPRAZOLE SODIUM 40 MG/10ML
80 INJECTION, POWDER, LYOPHILIZED, FOR SOLUTION INTRAVENOUS ONCE
Status: COMPLETED | OUTPATIENT
Start: 2025-05-10 | End: 2025-05-10

## 2025-05-10 RX ORDER — SPIRONOLACTONE 25 MG/1
25 TABLET ORAL DAILY
Status: DISCONTINUED | OUTPATIENT
Start: 2025-05-10 | End: 2025-05-13

## 2025-05-10 RX ORDER — ATORVASTATIN CALCIUM 40 MG/1
40 TABLET, FILM COATED ORAL NIGHTLY
Status: DISCONTINUED | OUTPATIENT
Start: 2025-05-10 | End: 2025-05-16 | Stop reason: HOSPADM

## 2025-05-10 RX ORDER — DILTIAZEM HYDROCHLORIDE 120 MG/1
120 CAPSULE, COATED, EXTENDED RELEASE ORAL DAILY
Status: DISCONTINUED | OUTPATIENT
Start: 2025-05-11 | End: 2025-05-16 | Stop reason: HOSPADM

## 2025-05-10 RX ORDER — PANTOPRAZOLE SODIUM 40 MG/10ML
40 INJECTION, POWDER, LYOPHILIZED, FOR SOLUTION INTRAVENOUS DAILY
Status: DISCONTINUED | OUTPATIENT
Start: 2025-05-10 | End: 2025-05-13

## 2025-05-10 RX ORDER — SODIUM CHLORIDE 9 MG/ML
INJECTION, SOLUTION INTRAVENOUS PRN
Status: DISCONTINUED | OUTPATIENT
Start: 2025-05-10 | End: 2025-05-14

## 2025-05-10 RX ORDER — TRAZODONE HYDROCHLORIDE 50 MG/1
50 TABLET ORAL NIGHTLY
Status: DISCONTINUED | OUTPATIENT
Start: 2025-05-10 | End: 2025-05-16 | Stop reason: HOSPADM

## 2025-05-10 RX ORDER — POLYVINYL ALCOHOL 14 MG/ML
1 SOLUTION/ DROPS OPHTHALMIC PRN
Status: DISCONTINUED | OUTPATIENT
Start: 2025-05-10 | End: 2025-05-16 | Stop reason: HOSPADM

## 2025-05-10 RX ADMIN — ATORVASTATIN CALCIUM 40 MG: 40 TABLET, FILM COATED ORAL at 22:12

## 2025-05-10 RX ADMIN — PANTOPRAZOLE SODIUM 80 MG: 40 INJECTION, POWDER, FOR SOLUTION INTRAVENOUS at 05:10

## 2025-05-10 RX ADMIN — METOPROLOL TARTRATE 50 MG: 50 TABLET, FILM COATED ORAL at 12:48

## 2025-05-10 RX ADMIN — SODIUM CHLORIDE 500 ML: 0.9 INJECTION, SOLUTION INTRAVENOUS at 06:37

## 2025-05-10 RX ADMIN — METOPROLOL TARTRATE 50 MG: 50 TABLET, FILM COATED ORAL at 23:36

## 2025-05-10 RX ADMIN — PANTOPRAZOLE SODIUM 40 MG: 40 INJECTION, POWDER, FOR SOLUTION INTRAVENOUS at 12:48

## 2025-05-10 RX ADMIN — ACETAMINOPHEN 650 MG: 325 TABLET ORAL at 23:36

## 2025-05-10 RX ADMIN — LATANOPROST 1 DROP: 50 SOLUTION OPHTHALMIC at 22:13

## 2025-05-10 ASSESSMENT — PAIN SCALES - GENERAL
PAINLEVEL_OUTOF10: 0
PAINLEVEL_OUTOF10: 0

## 2025-05-10 ASSESSMENT — PAIN - FUNCTIONAL ASSESSMENT: PAIN_FUNCTIONAL_ASSESSMENT: NONE - DENIES PAIN

## 2025-05-10 NOTE — ED NOTES
Blood transfusion started at this time. Pt updated on POC. Call light in reach. This RN in pt room for next 15 minutes monitoring for transfusion reaction.

## 2025-05-10 NOTE — ED NOTES
No possible transfusion reaction at this time. Pt denies any chills, tremors, N/V, chest pain, SOB. Pt is connected to telemetry. Medications infusing. Call light in reach. Updated on POC.

## 2025-05-10 NOTE — ED PROVIDER NOTES
ATTENDING NOTE:    I supervised and discussed the history, physical exam and the management of this patient with the resident. I reviewed the resident's note and agree with the documented findings and plan of care.  Please see my additional note.    I personally saw and examined the patient.  I have reviewed and agree with the resident's findings, including all diagnostic interpretations and treatment plans as written.  I was present for the key portion of any procedures performed and the inclusive time noted in any critical care statement.    Electronically verified by CED LUX      Critical Care    Performed by: Ced Lux MD  Authorized by: Ced Lux MD    Critical care provider statement:     Critical care time (minutes):  35    Critical care time was exclusive of:  Separately billable procedures and treating other patients and teaching time    Critical care was necessary to treat or prevent imminent or life-threatening deterioration of the following conditions: anemia, symptomatic with chest pain and shortness of breath.    Critical care was time spent personally by me on the following activities:  Ordering and performing treatments and interventions, ordering and review of laboratory studies, ordering and review of radiographic studies, pulse oximetry, re-evaluation of patient's condition, review of old charts, development of treatment plan with patient or surrogate, discussions with consultants, evaluation of patient's response to treatment, examination of patient and obtaining history from patient or surrogate    I assumed direction of critical care for this patient from another provider in my specialty: no      Care discussed with: admitting provider    Comments:      Patient presents with reported chest pain, sob, dark stools while on xarelto. No history of GI bleed or anemia in past. Pale on exam, Hgb 5.5. bp initially >100, occasionally high 90's. EKG noted, depression secondary to anemia.

## 2025-05-10 NOTE — PLAN OF CARE
Problem: Chronic Conditions and Co-morbidities  Goal: Patient's chronic conditions and co-morbidity symptoms are monitored and maintained or improved  Outcome: Progressing  Flowsheets (Taken 5/10/2025 1445)  Care Plan - Patient's Chronic Conditions and Co-Morbidity Symptoms are Monitored and Maintained or Improved:   Monitor and assess patient's chronic conditions and comorbid symptoms for stability, deterioration, or improvement   Collaborate with multidisciplinary team to address chronic and comorbid conditions and prevent exacerbation or deterioration     Problem: Discharge Planning  Goal: Discharge to home or other facility with appropriate resources  Outcome: Progressing  Flowsheets (Taken 5/10/2025 1445)  Discharge to home or other facility with appropriate resources:   Arrange for needed discharge resources and transportation as appropriate   Identify barriers to discharge with patient and caregiver     Problem: Pain  Goal: Verbalizes/displays adequate comfort level or baseline comfort level  Outcome: Progressing  Flowsheets (Taken 5/10/2025 1445)  Verbalizes/displays adequate comfort level or baseline comfort level:   Encourage patient to monitor pain and request assistance   Assess pain using appropriate pain scale   Administer analgesics based on type and severity of pain and evaluate response   Implement non-pharmacological measures as appropriate and evaluate response     Problem: Skin/Tissue Integrity  Goal: Skin integrity remains intact  Description: 1.  Monitor for areas of redness and/or skin breakdown2.  Assess vascular access sites hourly3.  Every 4-6 hours minimum:  Change oxygen saturation probe site4.  Every 4-6 hours:  If on nasal continuous positive airway pressure, respiratory therapy assess nares and determine need for appliance change or resting period  Outcome: Progressing  Flowsheets (Taken 5/10/2025 1445)  Skin Integrity Remains Intact: Monitor for areas of redness and/or skin

## 2025-05-10 NOTE — CONSULTS
[facility]  Cardiology Consult      Patient:  Roberto Prescott  YOB: 1935    MRN: [id number]   Acct: [id number]     Primary Care Physician: Ramin Asif MD    REASON FOR CONSULT:    positive troponin      A Fib, anemia    CHIEF COMPLAINT:    Shortness of breath     HISTORY OF PRESENT ILLNESS:    Roberto Prescott is a pleasant 89 year old male patient with past medical history that includes:   Past Medical History:   Diagnosis Date    Arthritis     Asthma     Atrial fibrillation (HCC)     CHF (congestive heart failure) (HCC)     COPD (chronic obstructive pulmonary disease) (HCC)     Encephalopathy acute 11/20/2013    Hyperlipidemia     Hypertension     Muscular deconditioning 12/23/2013    Pneumonia    Echocardiogram 9/2024 revealed EF of 50-55%, mild to moderate aortic stenosis, mild MR, bi-atrial enlargement. He has history of pacemaker placement, atrial fibrillation, on Xarelto. Patient is followed by Dr Osborn as outpatient. The patient was admitted to the hospital after he presented with fatigue, shortness of breath and tachypnea. Patient denies chest pain, orthopnea, paroxysmal nocturnal dyspnea, palpitations, dizziness, syncope, recent weight gain or leg swelling. Patient has reported diarrhea with dark stools. Cardiology was consulted for elevated Troponin. HS Troponin today was 159. He seems to have chronic Troponin elevation. HS Troponin on prior admission on 9/2024 was as high as 200. He continues to deny chest pain. EKG revealed atrial fibrillation, ST depression and TWI in inferior and anterolateral leads. EKG changes are chronic and when compared to old EKG, no new significant changes were noted. Creatinine 1.3. NT-Pro-BNP 2,975. Xarelto was held after severe anemia was noted. Hgb on admission was 5.5, blood transfusion was ordered.     All labs, EKG's, diagnostic testing and images as well as cardiac cath, stress testing   were reviewed during this  ESOPHAGOGASTRODUODENOSCOPY CONTROL HEMORRHAGE performed by Danny Mcghee MD at [facility] ENDOSCOPY       Medications Prior to Admission:    Medications Prior to Admission: traZODone (DESYREL) 150 MG tablet, Take 1 tablet by mouth nightly  atorvastatin (LIPITOR) 40 MG tablet, Take 1 tablet by mouth nightly  sodium chloride 1 g tablet, Take 1 tablet by mouth 3 times daily (with meals)  clobetasol propionate 0.05 % GEL gel, Apply 1 applicator topically daily  LACTOBACILLUS PO, Take 1 tablet by mouth 2 times daily  Propylene Glycol (SYSTANE COMPLETE OP), Apply 1 drop to eye 4 times daily  metoprolol tartrate (LOPRESSOR) 25 MG tablet, Take 2 tablets by mouth every 12 hours  candesartan (ATACAND) 16 MG tablet, Take 1 tablet by mouth 2 times daily  vitamin B-12 (CYANOCOBALAMIN) 100 MCG tablet, Take 10 tablets by mouth daily  triamcinolone (KENALOG) 0.1 % cream, Apply 0.1 g topically every 12 hours as needed (rash) Apply topically 2 times daily.  Cholecalciferol (VITAMIN D) 50 MCG (2000 UT) CAPS capsule, Take 1 capsule by mouth daily  pantoprazole (PROTONIX) 40 MG tablet, Take 1 tablet by mouth daily  SPIRIVA HANDIHALER 18 MCG inhalation capsule, INHALE THE CONTENTS OF ONE CAPSULE BY MOUTH ONCE DAILY AS DIRECTED  docusate sodium (COLACE) 100 MG capsule, TAKE ONE (1) CAPSULE BY MOUTH TWICE DAILY (Patient taking differently: Take 1 capsule by mouth 2 times daily)  XARELTO 20 MG TABS tablet, TAKE ONE (1) TABLET BY MOUTH EACH EVENING WITH MEAL  albuterol sulfate HFA (PROAIR HFA) 108 (90 BASE) MCG/ACT inhaler, INHALE 2 PUFFS BY MOUTH EVERY 4 HOURS AS NEEDED  LANOXIN 125 MCG tablet, Take 1 tablet by mouth daily  fluticasone (FLONASE) 50 MCG/ACT nasal spray, INSTILL 1 SPRAY INTRANASALLY DAILY (Patient taking differently: 1 spray daily)  clotrimazole (LOTRIMIN) 1 % cream, APPLY TOPICALLY AS DIRECTED (Patient taking differently: Apply 1 applicator topically 2 times daily APPLY TOPICALLY AS DIRECTED)  spironolactone (ALDACTONE) 25 MG  5.4 05/10/2025 03:50 AM     05/10/2025 03:50 AM    CO2 18 05/10/2025 03:50 AM    BUN 28 05/10/2025 03:50 AM    CREATININE 1.3 05/10/2025 03:50 AM    CALCIUM 8.4 05/10/2025 03:50 AM    LABGLOM 53 05/10/2025 03:50 AM    LABGLOM >90 06/03/2022 06:15 AM    GLUCOSE 142 05/10/2025 03:50 AM     Hepatic Function Panel:    Lab Results   Component Value Date/Time    ALKPHOS 71 05/10/2025 03:50 AM    ALT 15 05/10/2025 03:50 AM    AST 21 05/10/2025 03:50 AM    BILITOT 0.3 05/10/2025 03:50 AM    BILIDIR 0.2 05/10/2025 03:50 AM     Magnesium:    Lab Results   Component Value Date/Time    MG 1.9 11/13/2016 11:26 AM     Warfarin PT/INR:  No components found for: \"PTPATWAR\", \"PTINRWAR\"  HgBA1c:  No results found for: \"LABA1C\"  FLP:    Lab Results   Component Value Date/Time    TRIG 55 06/01/2022 06:07 AM    HDL 59 06/01/2022 06:07 AM     TSH:    Lab Results   Component Value Date/Time    TSH 3.100 09/27/2024 09:56 PM     BNP:   Lab Results   Component Value Date    .6 (H) 11/09/2013     ASSESSMENT:  Elevated Troponin   DIANNE  Acute blood loss anemia   GI bleeding   Atrial fibrillation  Chronic HFpEF  Mild to moderate aortic stenosis, mitral stenosis   H/o pacemaker placement   COPD   Hypertension   Dyslipidemia     RECOMMENDATIONS:  Echocardiogram 9/2024 revealed EF of 50-55%, mild to moderate aortic stenosis, mild MR, bi-atrial enlargement, Mild to moderate mitral stenosis  He has history of pacemaker placement  Creatinine 1.3  NT-Pro-BNP 2,975  Monitor intake and output   Daily weight   H/o atrial fibrillation, on Xarelto  Xarelto was held after severe anemia was noted  Hgb on admission was 5.5, blood transfusion was ordered  GI was consulted  Keep Hgb above 7  Monitor on telemetry   He may benefit from outpatient evaluation for Watchman device placement   Cardiology was consulted for elevated Troponin  HS Troponin today was 159  He seems to have chronic Troponin elevation. HS Troponin on prior admission on 9/2024 was

## 2025-05-10 NOTE — CONSULTS
Patient seen for consult dictated plan for EGD likely done Monday except to have acute GI bleed after hospitalizations currently on PPI IV twice daily Xarelto on hold H&H will be watch every 6 hours

## 2025-05-10 NOTE — ED PROVIDER NOTES
[facility]  EMERGENCY DEPARTMENT ENCOUNTER          Pt Name: Roberto Prescott  MRN: [id number]  Birthdate 1935  Date of evaluation: 5/10/2025  Treating Resident Physician: Dayne Kaufman MD  Supervising Physician: Daniela Arreguin MD    History obtained from the patient.     CHIEF COMPLAINT       Chief Complaint   Patient presents with    Shortness of Breath    Vomiting       HISTORY OF PRESENT ILLNESS    Roberto Prescott is a 89 y.o. male with a PMH of COPD on 2 L NC, CHF, A-fib on Xarelto who presents to the emergency department by squad from assisted living with complaint of worsening shortness of breath.  Patient reports the last 3 weeks has been experiencing worsening shortness of breath requiring increasing oxygen years.  Patient states has been having dark appearing diarrhea in the last 5 days.  He reports compliance with Xarelto for A-fib.  He endorses distant history of colonoscopy and endoscopy.  He denies recent hospitalization, worsening swelling of lower extremities, smoking cigarettes, sick exposure, calf pain.  He had an episode of vomiting and left-sided chest pain yesterday because of worsening SOB, reason he came to the ED for further evaluation.  Otherwise, review of systems unremarkable except for aforementioned.      Triage notes and Nursing notes were reviewed by myself.  Any discrepancies are addressed above.    PAST MEDICAL HISTORY     Past Medical History:   Diagnosis Date    Arthritis     Asthma     Atrial fibrillation (HCC)     CHF (congestive heart failure) (HCC)     COPD (chronic obstructive pulmonary disease) (HCC)     Encephalopathy acute 11/20/2013    Hyperlipidemia     Hypertension     Muscular deconditioning 12/23/2013    Pneumonia        SURGICAL HISTORY       Past Surgical History:   Procedure Laterality Date    ABDOMEN SURGERY      ABDOMEN SURGERY N/A 7/11/2021    LEFT ABDOMEN WALL WOUND DEBRIDEMENT AND REPAIR performed by Shaka GUAJARDO

## 2025-05-10 NOTE — PROGRESS NOTES
Patient admitted to 4A Room 03 from ED and via cart/stretcher, Anemia    No complaints upon arrival to the room.  IV site free of s/s of infection or infiltration.   Vital signs obtained. Assessment and data collection initiated. Oriented to room. Policies and procedures for 4A explained All questions answered with no further questions at this time. Fall prevention and safety brochure discussed with patient. 2 person skin check completed with Poly BALL.    Patient declines PCP notification.  Patient declines family notification.

## 2025-05-10 NOTE — H&P
Hx and physical (internal medicine)       Patient - Roberto Prescott,  Age - 89 y.o.    - 1935      Room Number - 4A-03/003-A   N -  [id number]   [facility] # - [id number]  Date of Admission -  5/10/2025  3:48 AM  Patient's PCP: Ramin Asif MD     Requesting Physician: Ramin Asif MD     CHIEF COMPLAINT   Shortness of breath     HISTORY OF PRESENT ILLNESS       This is a very pleasant 89 y.o. male who was admitted to the hospital with a chief complaints of shortness of breath. He lives with son. He has hx of CHF atrial fibrillation on xarelto was found to have severe anemia. He denies of vomiting of blood. Has been having gas. Reports of dark stool. He has shortness of breath and gets short of breath on exertion. He had a fall over three wks ago and had broken his forearm. He has seen Dr Mcghee in the past. He is getting blood transfusion. Has seen Dr Gibson strickland. Has elevated troponin. He has OA Asthma CHF and COPD    PAST MEDICAL  HISTORY       Past Medical History:   Diagnosis Date    Arthritis     Asthma     Atrial fibrillation (HCC)     CHF (congestive heart failure) (HCC)     COPD (chronic obstructive pulmonary disease) (HCC)     Encephalopathy acute 2013    Hyperlipidemia     Hypertension     Muscular deconditioning 2013    Pneumonia        PAST SURGICAL HISTORY     Past Surgical History:   Procedure Laterality Date    ABDOMEN SURGERY      ABDOMEN SURGERY N/A 2021    LEFT ABDOMEN WALL WOUND DEBRIDEMENT AND REPAIR performed by Shaka Armendariz MD at [facility] OR    EKG 12-LEAD  2015         FRACTURE SURGERY  unsure    HERNIA REPAIR      Bharati    OTHER SURGICAL HISTORY  10-30-13    abdominal exploration, closure of evisceration with retention sutures-Dr. Calabrese     OTHER SURGICAL HISTORY  10-25-13    Placement of gastrostomy tube-Dr. Calabrese     UPPER GASTROINTESTINAL ENDOSCOPY N/A 2024    ESOPHAGOGASTRODUODENOSCOPY CONTROL

## 2025-05-10 NOTE — ED NOTES
Report from LIZZY Su and care assumed at this time.  Blood transfusing, patient tolerating well.  No needs voiced at this time.

## 2025-05-10 NOTE — ED NOTES
Pt in bed and updated on POC. Pt unable to urinate at this time. Call light in reach. Telemetry in place .

## 2025-05-10 NOTE — ED NOTES
Labs sent, attempted urine sample, stool sample sent. Pt updated on POC by Dr. Oscar and Dr. Arreguin. Call light in reach.

## 2025-05-10 NOTE — ED NOTES
ED to inpatient nurses report      Chief Complaint:  Chief Complaint   Patient presents with    Shortness of Breath    Vomiting     Present to ED from: assisted living    MOA:     LOC: alert and orientated to name, place, date  Mobility: Requires assistance * 2  Oxygen Baseline: 2L NC    Current needs required: 2L NC     Code Status:   Prior    What abnormal results were found and what did you give/do to treat them? Symptomatic anemia, blood transfusion, fluids, Protonix  Any procedures or intervention occur? Stool sample, EKG    Mental Status:  Level of Consciousness: Alert (0)    Psych Assessment:        Vitals:  Patient Vitals for the past 24 hrs:   BP Temp Temp src Pulse Resp SpO2 Height Weight   05/10/25 0642 (!) 108/43 97.8 °F (36.6 °C) Oral 87 20 100 % -- --   05/10/25 0637 (!) 94/44 97.7 °F (36.5 °C) Oral 90 17 100 % -- --   05/10/25 0632 (!) 96/45 98.3 °F (36.8 °C) Oral 86 18 100 % -- --   05/10/25 0621 (!) 109/33 98.1 °F (36.7 °C) -- 81 14 100 % -- --   05/10/25 0523 91/68 -- -- 91 17 95 % -- --   05/10/25 0453 (!) 105/58 -- -- 87 18 99 % -- --   05/10/25 0439 -- -- -- 94 18 100 % -- --   05/10/25 0438 (!) 118/47 -- -- 90 -- 100 % -- --   05/10/25 0352 110/70 98 °F (36.7 °C) Oral 95 21 100 % 1.854 m (6' 1\") 106.1 kg (234 lb)        LDAs:   Peripheral IV 05/10/25 Posterior;Right Wrist (Active)       Peripheral IV 05/10/25 Right Antecubital (Active)       Ambulatory Status:  Presents to emergency department  because of falls (Syncope, seizure, or loss of consciousness): No, Age > 70: Yes, Altered Mental Status, Intoxication with alcohol or substance confusion (Disorientation, impaired judgment, poor safety awaremess, or inability to follow instructions): No, Impaired Mobility: Ambulates or transfers with assistive devices or assistance; Unable to ambulate or transer.: Yes, Nursing Judgement: Yes    Diagnosis:  DISPOSITION Decision To Admit 05/10/2025 05:41:41 AM   Final diagnoses:   Symptomatic anemia     deconditioning 12/23/2013    Pneumonia            Electronically signed by Georges Baltazar RN on 5/10/2025 at 6:50 AM

## 2025-05-10 NOTE — ED TRIAGE NOTES
Pt to ED from assisted living via ems with c/o emesis and SOB. Pt had emesis episode last evening during dinner. Pt stood up and walked around this morning and became short of breath. Pt stopped taking diuretic 2 days ago. Pt chronically on 2L NC. Telemetry in place. EKG in place. Call light in reach.

## 2025-05-11 ENCOUNTER — APPOINTMENT (OUTPATIENT)
Age: 89
End: 2025-05-11
Attending: INTERNAL MEDICINE
Payer: MEDICARE

## 2025-05-11 LAB
ANION GAP SERPL CALC-SCNC: 10 MEQ/L (ref 8–16)
BASOPHILS ABSOLUTE: 0.1 THOU/MM3 (ref 0–0.1)
BASOPHILS NFR BLD AUTO: 0.4 %
BUN SERPL-MCNC: 35 MG/DL (ref 8–23)
CALCIUM SERPL-MCNC: 8.5 MG/DL (ref 8.8–10.2)
CHLORIDE SERPL-SCNC: 103 MEQ/L (ref 98–111)
CO2 SERPL-SCNC: 18 MEQ/L (ref 22–29)
CREAT SERPL-MCNC: 1.1 MG/DL (ref 0.7–1.2)
DEPRECATED RDW RBC AUTO: 54.1 FL (ref 35–45)
ECHO AO ASC DIAM: 3.1 CM
ECHO AO ASCENDING AORTA INDEX: 1.35 CM/M2
ECHO AV AREA PEAK VELOCITY: 1.6 CM2
ECHO AV AREA VTI: 1.4 CM2
ECHO AV AREA/BSA PEAK VELOCITY: 0.7 CM2/M2
ECHO AV AREA/BSA VTI: 0.6 CM2/M2
ECHO AV CUSP MM: 1 CM
ECHO AV MEAN GRADIENT: 9 MMHG
ECHO AV MEAN VELOCITY: 1.4 M/S
ECHO AV PEAK GRADIENT: 20 MMHG
ECHO AV PEAK VELOCITY: 2.2 M/S
ECHO AV VELOCITY RATIO: 0.45
ECHO AV VTI: 49.6 CM
ECHO BSA: 2.34 M2
ECHO EST RA PRESSURE: 8 MMHG
ECHO IVC PROX: 2.7 CM
ECHO LA AREA 2C: 36.4 CM2
ECHO LA AREA 4C: 37.3 CM2
ECHO LA DIAMETER INDEX: 2.13 CM/M2
ECHO LA DIAMETER: 4.9 CM
ECHO LA MAJOR AXIS: 8.6 CM
ECHO LA MINOR AXIS: 7.7 CM
ECHO LA VOL BP: 141 ML (ref 18–58)
ECHO LA VOL MOD A2C: 141 ML (ref 18–58)
ECHO LA VOL MOD A4C: 127 ML (ref 18–58)
ECHO LA VOL/BSA BIPLANE: 61 ML/M2 (ref 16–34)
ECHO LA VOLUME INDEX MOD A2C: 61 ML/M2 (ref 16–34)
ECHO LA VOLUME INDEX MOD A4C: 55 ML/M2 (ref 16–34)
ECHO LV EF PHYSICIAN: 60 %
ECHO LV FRACTIONAL SHORTENING: 35 % (ref 28–44)
ECHO LV INTERNAL DIMENSION DIASTOLE INDEX: 1.87 CM/M2
ECHO LV INTERNAL DIMENSION DIASTOLIC: 4.3 CM (ref 4.2–5.9)
ECHO LV INTERNAL DIMENSION SYSTOLIC INDEX: 1.22 CM/M2
ECHO LV INTERNAL DIMENSION SYSTOLIC: 2.8 CM
ECHO LV ISOVOLUMETRIC RELAXATION TIME (IVRT): 81 MS
ECHO LV IVSD: 1.3 CM (ref 0.6–1)
ECHO LV MASS 2D: 207.8 G (ref 88–224)
ECHO LV MASS INDEX 2D: 90.3 G/M2 (ref 49–115)
ECHO LV POSTERIOR WALL DIASTOLIC: 1.3 CM (ref 0.6–1)
ECHO LV RELATIVE WALL THICKNESS RATIO: 0.6
ECHO LVOT AREA: 3.5 CM2
ECHO LVOT AV VTI INDEX: 0.41
ECHO LVOT DIAM: 2.1 CM
ECHO LVOT MEAN GRADIENT: 2 MMHG
ECHO LVOT PEAK GRADIENT: 4 MMHG
ECHO LVOT PEAK VELOCITY: 1 M/S
ECHO LVOT STROKE VOLUME INDEX: 30.7 ML/M2
ECHO LVOT SV: 70.6 ML
ECHO LVOT VTI: 20.4 CM
ECHO MV AREA VTI: 1.4 CM2
ECHO MV E DECELERATION TIME (DT): 264 MS
ECHO MV E VELOCITY: 1.7 M/S
ECHO MV LVOT VTI INDEX: 2.55
ECHO MV MAX VELOCITY: 2 M/S
ECHO MV MEAN GRADIENT: 5 MMHG
ECHO MV MEAN VELOCITY: 0.9 M/S
ECHO MV PEAK GRADIENT: 16 MMHG
ECHO MV REGURGITANT PEAK GRADIENT: 92 MMHG
ECHO MV REGURGITANT PEAK VELOCITY: 4.8 M/S
ECHO MV VTI: 52 CM
ECHO PV MAX VELOCITY: 1 M/S
ECHO PV PEAK GRADIENT: 4 MMHG
ECHO RIGHT VENTRICULAR SYSTOLIC PRESSURE (RVSP): 46 MMHG
ECHO RV INTERNAL DIMENSION: 3.1 CM
ECHO RV TAPSE: 1.9 CM (ref 1.7–?)
ECHO TV E WAVE: 0.7 M/S
ECHO TV REGURGITANT MAX VELOCITY: 3.09 M/S
ECHO TV REGURGITANT PEAK GRADIENT: 38 MMHG
EKG ATRIAL RATE: 110 BPM
EKG Q-T INTERVAL: 284 MS
EKG Q-T INTERVAL: 354 MS
EKG QRS DURATION: 78 MS
EKG QRS DURATION: 92 MS
EKG QTC CALCULATION (BAZETT): 355 MS
EKG QTC CALCULATION (BAZETT): 433 MS
EKG R AXIS: 42 DEGREES
EKG R AXIS: 72 DEGREES
EKG T AXIS: -108 DEGREES
EKG T AXIS: -137 DEGREES
EKG VENTRICULAR RATE: 90 BPM
EKG VENTRICULAR RATE: 94 BPM
EOSINOPHIL NFR BLD AUTO: 2.7 %
EOSINOPHILS ABSOLUTE: 0.5 THOU/MM3 (ref 0–0.4)
ERYTHROCYTE [DISTWIDTH] IN BLOOD BY AUTOMATED COUNT: 15.8 % (ref 11.5–14.5)
GFR SERPL CREATININE-BSD FRML MDRD: 65 ML/MIN/1.73M2
GLUCOSE SERPL-MCNC: 102 MG/DL (ref 74–109)
HCT VFR BLD AUTO: 22.3 % (ref 42–52)
HCT VFR BLD AUTO: 22.6 % (ref 42–52)
HCT VFR BLD AUTO: 23.1 % (ref 42–52)
HCT VFR BLD AUTO: 23.5 % (ref 42–52)
HGB BLD-MCNC: 7.3 GM/DL (ref 14–18)
HGB BLD-MCNC: 7.5 GM/DL (ref 14–18)
HGB BLD-MCNC: 7.6 GM/DL (ref 14–18)
HGB BLD-MCNC: 8 GM/DL (ref 14–18)
IMM GRANULOCYTES # BLD AUTO: 0.15 THOU/MM3 (ref 0–0.07)
IMM GRANULOCYTES NFR BLD AUTO: 0.9 %
LACTATE SERPL-SCNC: 1.1 MMOL/L (ref 0.5–2)
LYMPHOCYTES ABSOLUTE: 1.2 THOU/MM3 (ref 1–4.8)
LYMPHOCYTES NFR BLD AUTO: 6.9 %
MCH RBC QN AUTO: 31.1 PG (ref 26–33)
MCHC RBC AUTO-ENTMCNC: 32.9 GM/DL (ref 32.2–35.5)
MCV RBC AUTO: 94.7 FL (ref 80–94)
MONOCYTES ABSOLUTE: 1.3 THOU/MM3 (ref 0.4–1.3)
MONOCYTES NFR BLD AUTO: 7.9 %
NEUTROPHILS ABSOLUTE: 13.8 THOU/MM3 (ref 1.8–7.7)
NEUTROPHILS NFR BLD AUTO: 81.2 %
NRBC BLD AUTO-RTO: 0 /100 WBC
PLATELET # BLD AUTO: 223 THOU/MM3 (ref 130–400)
PMV BLD AUTO: 10.8 FL (ref 9.4–12.4)
POTASSIUM SERPL-SCNC: 5 MEQ/L (ref 3.5–5.2)
RBC # BLD AUTO: 2.44 MILL/MM3 (ref 4.7–6.1)
SODIUM SERPL-SCNC: 131 MEQ/L (ref 135–145)
WBC # BLD AUTO: 17 THOU/MM3 (ref 4.8–10.8)

## 2025-05-11 PROCEDURE — 93306 TTE W/DOPPLER COMPLETE: CPT

## 2025-05-11 PROCEDURE — 36415 COLL VENOUS BLD VENIPUNCTURE: CPT

## 2025-05-11 PROCEDURE — 85014 HEMATOCRIT: CPT

## 2025-05-11 PROCEDURE — 93306 TTE W/DOPPLER COMPLETE: CPT | Performed by: INTERNAL MEDICINE

## 2025-05-11 PROCEDURE — 2700000000 HC OXYGEN THERAPY PER DAY

## 2025-05-11 PROCEDURE — 99232 SBSQ HOSP IP/OBS MODERATE 35: CPT | Performed by: PHYSICIAN ASSISTANT

## 2025-05-11 PROCEDURE — 80048 BASIC METABOLIC PNL TOTAL CA: CPT

## 2025-05-11 PROCEDURE — 83605 ASSAY OF LACTIC ACID: CPT

## 2025-05-11 PROCEDURE — 2060000000 HC ICU INTERMEDIATE R&B

## 2025-05-11 PROCEDURE — 6360000002 HC RX W HCPCS: Performed by: INTERNAL MEDICINE

## 2025-05-11 PROCEDURE — 93010 ELECTROCARDIOGRAM REPORT: CPT | Performed by: INTERNAL MEDICINE

## 2025-05-11 PROCEDURE — 85018 HEMOGLOBIN: CPT

## 2025-05-11 PROCEDURE — 6370000000 HC RX 637 (ALT 250 FOR IP): Performed by: INTERNAL MEDICINE

## 2025-05-11 PROCEDURE — 85025 COMPLETE CBC W/AUTO DIFF WBC: CPT

## 2025-05-11 RX ORDER — ONDANSETRON 4 MG/1
4 TABLET, ORALLY DISINTEGRATING ORAL EVERY 6 HOURS PRN
Status: DISCONTINUED | OUTPATIENT
Start: 2025-05-11 | End: 2025-05-16 | Stop reason: HOSPADM

## 2025-05-11 RX ADMIN — METOPROLOL TARTRATE 50 MG: 50 TABLET, FILM COATED ORAL at 08:54

## 2025-05-11 RX ADMIN — METOPROLOL TARTRATE 50 MG: 50 TABLET, FILM COATED ORAL at 22:11

## 2025-05-11 RX ADMIN — PANTOPRAZOLE SODIUM 40 MG: 40 INJECTION, POWDER, FOR SOLUTION INTRAVENOUS at 08:58

## 2025-05-11 RX ADMIN — ACETAMINOPHEN 650 MG: 325 TABLET ORAL at 22:11

## 2025-05-11 RX ADMIN — DILTIAZEM HYDROCHLORIDE 120 MG: 120 CAPSULE, EXTENDED RELEASE ORAL at 08:53

## 2025-05-11 RX ADMIN — ATORVASTATIN CALCIUM 40 MG: 40 TABLET, FILM COATED ORAL at 22:11

## 2025-05-11 RX ADMIN — LATANOPROST 1 DROP: 50 SOLUTION OPHTHALMIC at 22:11

## 2025-05-11 ASSESSMENT — PAIN SCALES - GENERAL
PAINLEVEL_OUTOF10: 5
PAINLEVEL_OUTOF10: 5
PAINLEVEL_OUTOF10: 4
PAINLEVEL_OUTOF10: 0

## 2025-05-11 ASSESSMENT — PAIN DESCRIPTION - DESCRIPTORS
DESCRIPTORS: ACHING
DESCRIPTORS: ACHING

## 2025-05-11 ASSESSMENT — PAIN DESCRIPTION - LOCATION
LOCATION: LEG
LOCATION: LEG

## 2025-05-11 ASSESSMENT — PAIN DESCRIPTION - ONSET
ONSET: ON-GOING
ONSET: ON-GOING

## 2025-05-11 ASSESSMENT — PAIN DESCRIPTION - ORIENTATION
ORIENTATION: LOWER
ORIENTATION: LOWER

## 2025-05-11 ASSESSMENT — PAIN DESCRIPTION - FREQUENCY
FREQUENCY: INTERMITTENT
FREQUENCY: INTERMITTENT

## 2025-05-11 ASSESSMENT — PAIN - FUNCTIONAL ASSESSMENT
PAIN_FUNCTIONAL_ASSESSMENT: ACTIVITIES ARE NOT PREVENTED
PAIN_FUNCTIONAL_ASSESSMENT: ACTIVITIES ARE NOT PREVENTED

## 2025-05-11 ASSESSMENT — PAIN DESCRIPTION - PAIN TYPE
TYPE: ACUTE PAIN
TYPE: ACUTE PAIN

## 2025-05-11 NOTE — PROGRESS NOTES
Cardiology Progress Note      Patient:  Roberto Prescott  YOB: 1935  MRN: [id number]   Acct: [id number]  Admit Date:  5/10/2025  Primary Cardiologist:  Dr Arreaga    Per Dr Diego's consult note  REASON FOR CONSULT:    positive troponin       A Fib, anemia     CHIEF COMPLAINT:    Shortness of breath      HISTORY OF PRESENT ILLNESS:    Roberto Prescott is a pleasant 89 year old male patient with past medical history that includes:   Past Medical History        Past Medical History:   Diagnosis Date    Arthritis      Asthma      Atrial fibrillation (HCC)      CHF (congestive heart failure) (HCC)      COPD (chronic obstructive pulmonary disease) (HCC)      Encephalopathy acute 11/20/2013    Hyperlipidemia      Hypertension      Muscular deconditioning 12/23/2013    Pneumonia        Echocardiogram 9/2024 revealed EF of 50-55%, mild to moderate aortic stenosis, mild MR, bi-atrial enlargement. He has history of pacemaker placement, atrial fibrillation, on Xarelto. Patient is followed by Dr Osborn as outpatient. The patient was admitted to the hospital after he presented with fatigue, shortness of breath and tachypnea. Patient denies chest pain, orthopnea, paroxysmal nocturnal dyspnea, palpitations, dizziness, syncope, recent weight gain or leg swelling. Patient has reported diarrhea with dark stools. Cardiology was consulted for elevated Troponin. HS Troponin today was 159. He seems to have chronic Troponin elevation. HS Troponin on prior admission on 9/2024 was as high as 200. He continues to deny chest pain. EKG revealed atrial fibrillation, ST depression and TWI in inferior and anterolateral leads. EKG changes are chronic and when compared to old EKG, no new significant changes were noted. Creatinine 1.3. NT-Pro-BNP 2,975. Xarelto was held after severe anemia was noted. Hgb on admission was 5.5, blood transfusion was ordered.     Subjective (Events in last 24 hours):   Denies any CP or SOB.   Myxomatotic degeneration of mitral valve.   Calcification of the mitral valve noted.   Decreased mitral valve mobility noted.   Mild mitral regurgitation is present.      Signature      ----------------------------------------------------------------   Electronically signed by Nroma Mathew MD (Interpreting   physician) on 05/31/2022 at 03:14 PM   ----------------------------------------------------------------      Findings      Mitral Valve   Myxomatotic degeneration of mitral valve.   Calcification of the mitral valve noted.   Decreased mitral valve mobility noted.   Mild mitral regurgitation is present.      Aortic Valve   Aortic valve appears tricuspid.   Thickened aortic valve leaflets noted.   Aortic valve leaflets are Moderately calcified.   Mild aortic stenosis is present.      Tricuspid Valve   Mild tricuspid regurgitation visualized.      Pulmonic Valve   The pulmonic valve was not well visualized .   Trivial pulmonic regurgitation visualized.      Left Atrium   Moderately dilated left atrium.      Left Ventricle   Ejection fraction is visually estimated at 50%.   Overall left ventricular function is normal.      Right Atrium   Right atrial size was normal.      Right Ventricle   The right ventricular size was normal with normal systolic function and   wall thickness.      Pericardial Effusion   The pericardium was normal in appearance with no evidence of a pericardial   effusion.      Pleural Effusion   No evidence of pleural effusion.      Aorta / Great Vessels   -Aortic root dimension within normal limits.   -The Pulmonary artery is within normal limits.   -IVC size is within normal limits with normal respiratory phasic changes.     M-Mode/2D Measurements & Calculations      LV Diastolic   LV Systolic Dimension: 3  AV Cusp Separation: 1.8 cmLA   Dimension: 4.2 cm                        Dimension: 4.4 cmAO Root   cm             LV Volume Diastolic: 78.6 Dimension: 3.5 cmLA Area: 30.3   LV FS:28.6 %   23.5 05/11/2025 08:01 AM     05/11/2025 01:43 AM       CMP:    Lab Results   Component Value Date/Time     05/11/2025 01:43 AM    K 5.0 05/11/2025 01:43 AM    K 5.4 05/10/2025 03:50 AM     05/11/2025 01:43 AM    CO2 18 05/11/2025 01:43 AM    BUN 35 05/11/2025 01:43 AM    CREATININE 1.1 05/11/2025 01:43 AM    LABGLOM 65 05/11/2025 01:43 AM    LABGLOM >90 06/03/2022 06:15 AM    GLUCOSE 102 05/11/2025 01:43 AM    CALCIUM 8.5 05/11/2025 01:43 AM       Hepatic Function Panel:    Lab Results   Component Value Date/Time    ALKPHOS 71 05/10/2025 03:50 AM    ALT 15 05/10/2025 03:50 AM    AST 21 05/10/2025 03:50 AM    BILITOT 0.3 05/10/2025 03:50 AM    BILIDIR 0.2 05/10/2025 03:50 AM       Magnesium:    Lab Results   Component Value Date/Time    MG 1.9 11/13/2016 11:26 AM       PT/INR:    Lab Results   Component Value Date/Time    INR 2.80 05/10/2025 03:50 AM       HgBA1c:  No results found for: \"LABA1C\"    FLP:    Lab Results   Component Value Date/Time    TRIG 55 06/01/2022 06:07 AM    HDL 59 06/01/2022 06:07 AM       TSH:    Lab Results   Component Value Date/Time    TSH 3.100 09/27/2024 09:56 PM         Assessment:  Acute blood loss anemia  GI bleed  Elevated Troponin  Chronic HFpEF  HTN  Dyslipidemia  Mild to Moderate AS,MS  S/P PPM  Permanent Afib      Plan:  Echo results pending  Continue to hold Xarelto   Continue Cardizem, and Metoprolol         Electronically signed by Trevor Castro PA-C on 5/11/2025 at 11:37 AM

## 2025-05-11 NOTE — PROGRESS NOTES
Progress note: Internal medicine.    Patient - Roberto Prescott,  Age - 89 y.o.    - 1935      Room Number - 4A-03/003-A   MRN -  [id number]   [facility] # - [id number]  Date of Admission -  5/10/2025  3:48 AM    SUBJECTIVE:   He has no new complaints. Last Hb is 8  Stool: tarry  GI and cardiology input appreciated  OBJECTIVE   VITALS    height is 1.854 m (6' 1\") and weight is 106.1 kg (234 lb). His oral temperature is 98.2 °F (36.8 °C). His blood pressure is 127/80 and his pulse is 63. His respiration is 22 and oxygen saturation is 100%.       Wt Readings from Last 3 Encounters:   05/10/25 106.1 kg (234 lb)   25 106.1 kg (234 lb)   24 110.2 kg (242 lb 15.2 oz)       I/O (24 Hours)    Intake/Output Summary (Last 24 hours) at 2025 0923  Last data filed at 5/10/2025 1516  Gross per 24 hour   Intake 1830.73 ml   Output 800 ml   Net 1030.73 ml       General Appearance  Awake, alert, oriented,  on nasal oxygen  HEENT - normocephalic, atraumatic, pale  conjunctiva,  anicteric sclera  Neck - Supple, no mass  Lungs -  Bilateral   air entry, + rhonchi, no wheeze  Cardiovascular - Heart sounds are normal.     Abdomen - soft, not distended, nontender,   Neurologic -awake and oriented  Skin - No bruising or bleeding  Extremities - No edema,    Hard cast over the left upper arm  MEDICATIONS:      [Held by provider] digoxin  125 mcg Oral Daily    [Held by provider] rivaroxaban  20 mg Oral Daily with breakfast    [Held by provider] spironolactone  25 mg Oral Daily    [Held by provider] traZODone  50 mg Oral Nightly    pantoprazole  40 mg IntraVENous Daily    atorvastatin  40 mg Oral Nightly    dilTIAZem  120 mg Oral Daily    latanoprost  1 drop Both Eyes Nightly    metoprolol tartrate  50 mg Oral Q12H      sodium chloride       sodium chloride, acetaminophen, albuterol, polyvinyl alcohol      LABS:     CBC:   Recent Labs  S/P cardiac pacemaker procedure Z95.0    NSTEMI (non-ST elevated myocardial infarction) ([facility]) I21.4    Respiratory failure with hypercapnia ([facility]) J96.92    Pneumonia J18.9    Pulmonary edema cardiac cause ([facility]) I50.1    Respiratory distress R06.03    COPD exacerbation ([facility]) J44.1    CHF (congestive heart failure) ([facility]) I50.9    Systolic CHF, acute on chronic ([facility]) I50.23    Hyperglycemia R73.9    Hyponatremia E87.1    Abdominal wall abscess L02.211    Cellulitis and abscess of leg L03.119, L02.419    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  GI planning upper endoscopy  Hb every 6 hrs  Dr Asif will resume care tomorrow        Jayant Brown MD, 5/11/2025 9:23 AM

## 2025-05-11 NOTE — CONSULTS
[place] [id number]                              CONSULTATION      PATIENT NAME: GARY KHAN              : 1935  MED REC NO: [id number]                       ROOM: [facility]  ACCOUNT NO: [id number]                       ADMIT DATE: 05/10/2025  PROVIDER: Danny Mcghee MD    GI CONSULT    CONSULT DATE: 05/10/2025      HISTORY OF PRESENT ILLNESS:  The patient is known to practice, seen in GI consult for evaluation of anemia.  The patient resides in assisted living.  He is on Xarelto.  For the last few days, he had black stool.  He got possible severe anemia and got 2 units of blood transfusion.  he felt dizzy and weak.  He said he vomited twice.  The vomit did not contain coffee-grounds material or blood.  His stools were blackish, softer more than usual, diarrhea-like.  No red blood seen with bowel movement.  No mucus with the stool.  The patient denies exposure to any patient with gastroenteritis or starting any medication which might lead to diarrhea.  He feels better at this time after transfusion.  He is short of breath with exertion, also slightly at rest at this time.  It has improved since he had blood transfusion.  He has coronary artery disease.  He has AFib.  He is on Xarelto at this time.    His previous endoscopy done by me was on 2024.  He had an EGD which showed normal EGD with no active GI bleed.    PAST MEDICAL HISTORY:  Significant for AFib, asthma, coronary artery disease, congestive heart failure, COPD, hyperlipidemia, hypertension, macular degeneration, history of pneumonia.    PAST SURGICAL HISTORY:  Significant for exploratory laparotomy; wound in left hip with sudden tension, debridement happened to the wound in the past; fracture surgery; hernia repair; upper endoscopy.    MEDICATIONS:  He is currently on Protonix b.i.d.  His Xarelto was on hold at this time.  Albuterol given.  Digoxin also on hold.

## 2025-05-11 NOTE — PLAN OF CARE
Problem: Chronic Conditions and Co-morbidities  Goal: Patient's chronic conditions and co-morbidity symptoms are monitored and maintained or improved  Outcome: Progressing  Flowsheets  Care Plan - Patient's Chronic Conditions and Co-Morbidity Symptoms are Monitored and Maintained or Improved:   Monitor and assess patient's chronic conditions and comorbid symptoms for stability, deterioration, or improvement   Collaborate with multidisciplinary team to address chronic and comorbid conditions and prevent exacerbation or deterioration     Problem: Discharge Planning  Goal: Discharge to home or other facility with appropriate resources  Outcome: Progressing  Flowsheets   Discharge to home or other facility with appropriate resources:   Arrange for needed discharge resources and transportation as appropriate   Identify barriers to discharge with patient and caregiver     Problem: Pain  Goal: Verbalizes/displays adequate comfort level or baseline comfort level  Outcome: Progressing  Flowsheets   Verbalizes/displays adequate comfort level or baseline comfort level:   Encourage patient to monitor pain and request assistance   Assess pain using appropriate pain scale   Administer analgesics based on type and severity of pain and evaluate response   Implement non-pharmacological measures as appropriate and evaluate response     Problem: Skin/Tissue Integrity  Goal: Skin integrity remains intact  Description: 1.  Monitor for areas of redness and/or skin breakdown2.  Assess vascular access sites hourly3.  Every 4-6 hours minimum:  Change oxygen saturation probe site4.  Every 4-6 hours:  If on nasal continuous positive airway pressure, respiratory therapy assess nares and determine need for appliance change or resting period  Outcome: Progressing  Flowsheets  Skin Integrity Remains Intact: Monitor for areas of redness and/or skin breakdown     Problem: Safety - Adult  Goal: Free from fall injury  Outcome:

## 2025-05-11 NOTE — PROGRESS NOTES
Gastroenterology  Progress Note    5/11/2025 11:12 AM  Subjective:   Admit Date: 5/10/2025    Interval History: Patient presented with acute GI bleed with anemia due to impart anticoagulations required blood transfusions.  Anticoagulation is on hold at this time his last bowel movement today is still black.  He is H&H stable without transfusions currently his PPI plan for diagnostic EGD tomorrow morning  Diet: ADULT DIET; Clear Liquid; No red dye    Medications:   Scheduled Meds:    [Held by provider] digoxin  125 mcg Oral Daily    [Held by provider] rivaroxaban  20 mg Oral Daily with breakfast    [Held by provider] spironolactone  25 mg Oral Daily    [Held by provider] traZODone  50 mg Oral Nightly    pantoprazole  40 mg IntraVENous Daily    atorvastatin  40 mg Oral Nightly    dilTIAZem  120 mg Oral Daily    latanoprost  1 drop Both Eyes Nightly    metoprolol tartrate  50 mg Oral Q12H     Continuous Infusions:    sodium chloride         CBC:   Recent Labs     05/10/25  0350 05/10/25  1456 05/10/25  1934 05/11/25  0143 05/11/25  0801   WBC 17.1*  --   --  17.0*  --    HGB 5.5*   < > 7.7* 7.6* 8.0*     --   --  223  --     < > = values in this interval not displayed.     BMP:    Recent Labs     05/10/25  0350 05/11/25  0143   * 131*   K 5.4* 5.0    103   CO2 18* 18*   BUN 28* 35*   CREATININE 1.3* 1.1   GLUCOSE 142* 102     Hepatic:   Recent Labs     05/10/25  0350   AST 21   ALT 15   BILITOT 0.3   ALKPHOS 71     INR:   Recent Labs     05/10/25  0350   INR 2.80*       Imaging:  No results found for this or any previous visit.    Results for orders placed during the hospital encounter of 11/13/16    CT ABDOMEN PELVIS W IV CONTRAST Additional Contrast? Oral    Narrative  CT ABDOMEN AND PELVIS WITH CONTRAST ENHANCEMENT:    CLINICAL INFORMATION: RLQ hernia. Right groin pain.    COMPARISON: 10/13/2013    TECHNIQUE: Multiple axial 5 mm images of the abdomen, pelvis, and lung bases were obtained  filed at 5/11/2025 0854  Gross per 24 hour   Intake 912.67 ml   Output 1175 ml   Net -262.33 ml     General appearance: alert and cooperative with exam  Lungs: clear to auscultation bilaterally  Heart: regular rate and rhythm, S1, S2 normal, no murmur, click, rub or gallop  Abdomen: Abdomen soft nontender multiple scars seen in the abdomen  Extremities: extremities normal, atraumatic, no cyanosis or edema    Assessment and Plan:   Acute GI blood loss anemia in part due to anticoagulations H&H stable today  Stools are still black however his H&H stable  Currently he is on PPI which we will continue  EGD with band tomorrow for diagnostic and treatment purposes.  If EGD is negative we will consider colonoscopy.      Follow up in GI Clinic after discharge in 2-3 week(s)    Patient Active Problem List:     COPD (chronic obstructive pulmonary disease) with acute bronchitis ([facility])     Encephalopathy acute     Muscular deconditioning     Constipation, chronic     Dermatitis     COPD (chronic obstructive pulmonary disease) ([facility])     COPD with exacerbation ([facility])     Obesity (BMI 30-39.9)     COPD with exacerbation ([facility])     Acute bronchitis     Hypertension     Atrial fibrillation ([facility])     SSS (sick sinus syndrome) ([facility])     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[facility])     Atrial fibrillation, chronic ([facility])     S/P cardiac pacemaker procedure     NSTEMI (non-ST elevated myocardial infarction) ([facility])     Respiratory failure with hypercapnia ([facility])     Pneumonia     Pulmonary edema cardiac cause ([facility])     Respiratory distress     COPD exacerbation ([facility])     CHF (congestive heart failure) ([facility])     Systolic CHF, acute on chronic ([facility])     Hyperglycemia     Hyponatremia     Abdominal wall abscess     Cellulitis and abscess of leg     SIADH (syndrome of inappropriate ADH production)     Sepsis ([facility])     Anemia     Asthma     Arthritis     Hyperlipidemia     GI bleed      Electronically signed by Danny Mcghee MD on 5/11/2025 at 11:12 AM

## 2025-05-12 ENCOUNTER — APPOINTMENT (OUTPATIENT)
Dept: ENDOSCOPY | Age: 89
End: 2025-05-12
Attending: INTERNAL MEDICINE
Payer: MEDICARE

## 2025-05-12 ENCOUNTER — ANESTHESIA (OUTPATIENT)
Dept: ENDOSCOPY | Age: 89
End: 2025-05-12
Payer: MEDICARE

## 2025-05-12 ENCOUNTER — ANESTHESIA EVENT (OUTPATIENT)
Dept: ENDOSCOPY | Age: 89
End: 2025-05-12
Payer: MEDICARE

## 2025-05-12 LAB
FERRITIN SERPL IA-MCNC: 91 NG/ML (ref 30–400)
HCT VFR BLD AUTO: 21.8 % (ref 42–52)
HCT VFR BLD AUTO: 26.1 % (ref 42–52)
HCT VFR BLD AUTO: 26.5 % (ref 42–52)
HGB BLD-MCNC: 7 GM/DL (ref 14–18)
HGB BLD-MCNC: 8.6 GM/DL (ref 14–18)
HGB BLD-MCNC: 8.6 GM/DL (ref 14–18)
IRON SATN MFR SERPL: 10 % (ref 20–50)
IRON SERPL-MCNC: 21 UG/DL (ref 61–157)
TIBC SERPL-MCNC: 202 UG/DL (ref 171–450)

## 2025-05-12 PROCEDURE — 2720000010 HC SURG SUPPLY STERILE: Performed by: INTERNAL MEDICINE

## 2025-05-12 PROCEDURE — 3700000001 HC ADD 15 MINUTES (ANESTHESIA): Performed by: INTERNAL MEDICINE

## 2025-05-12 PROCEDURE — 99232 SBSQ HOSP IP/OBS MODERATE 35: CPT | Performed by: NURSE PRACTITIONER

## 2025-05-12 PROCEDURE — 2580000003 HC RX 258: Performed by: REGISTERED NURSE

## 2025-05-12 PROCEDURE — 0W3P8ZZ CONTROL BLEEDING IN GASTROINTESTINAL TRACT, VIA NATURAL OR ARTIFICIAL OPENING ENDOSCOPIC: ICD-10-PCS | Performed by: INTERNAL MEDICINE

## 2025-05-12 PROCEDURE — 6370000000 HC RX 637 (ALT 250 FOR IP): Performed by: INTERNAL MEDICINE

## 2025-05-12 PROCEDURE — 85018 HEMOGLOBIN: CPT

## 2025-05-12 PROCEDURE — 2580000003 HC RX 258: Performed by: INTERNAL MEDICINE

## 2025-05-12 PROCEDURE — 82728 ASSAY OF FERRITIN: CPT

## 2025-05-12 PROCEDURE — 36415 COLL VENOUS BLD VENIPUNCTURE: CPT

## 2025-05-12 PROCEDURE — 85014 HEMATOCRIT: CPT

## 2025-05-12 PROCEDURE — 2060000000 HC ICU INTERMEDIATE R&B

## 2025-05-12 PROCEDURE — 6360000002 HC RX W HCPCS: Performed by: REGISTERED NURSE

## 2025-05-12 PROCEDURE — 1200000000 HC SEMI PRIVATE

## 2025-05-12 PROCEDURE — 36430 TRANSFUSION BLD/BLD COMPNT: CPT

## 2025-05-12 PROCEDURE — 7100000010 HC PHASE II RECOVERY - FIRST 15 MIN: Performed by: INTERNAL MEDICINE

## 2025-05-12 PROCEDURE — 6360000002 HC RX W HCPCS: Performed by: INTERNAL MEDICINE

## 2025-05-12 PROCEDURE — 3609013000 HC EGD TRANSORAL CONTROL BLEEDING ANY METHOD: Performed by: INTERNAL MEDICINE

## 2025-05-12 PROCEDURE — 83550 IRON BINDING TEST: CPT

## 2025-05-12 PROCEDURE — 7100000011 HC PHASE II RECOVERY - ADDTL 15 MIN: Performed by: INTERNAL MEDICINE

## 2025-05-12 PROCEDURE — 3700000000 HC ANESTHESIA ATTENDED CARE: Performed by: INTERNAL MEDICINE

## 2025-05-12 PROCEDURE — 2709999900 HC NON-CHARGEABLE SUPPLY: Performed by: INTERNAL MEDICINE

## 2025-05-12 PROCEDURE — 83540 ASSAY OF IRON: CPT

## 2025-05-12 PROCEDURE — 1200000003 HC TELEMETRY R&B

## 2025-05-12 RX ORDER — PROPOFOL 10 MG/ML
INJECTION, EMULSION INTRAVENOUS
Status: DISCONTINUED | OUTPATIENT
Start: 2025-05-12 | End: 2025-05-12 | Stop reason: SDUPTHER

## 2025-05-12 RX ORDER — LIDOCAINE HYDROCHLORIDE 20 MG/ML
INJECTION, SOLUTION EPIDURAL; INFILTRATION; INTRACAUDAL; PERINEURAL
Status: DISCONTINUED | OUTPATIENT
Start: 2025-05-12 | End: 2025-05-12 | Stop reason: SDUPTHER

## 2025-05-12 RX ORDER — SODIUM CHLORIDE 9 MG/ML
INJECTION, SOLUTION INTRAVENOUS CONTINUOUS
Status: DISCONTINUED | OUTPATIENT
Start: 2025-05-12 | End: 2025-05-13

## 2025-05-12 RX ORDER — SODIUM CHLORIDE 9 MG/ML
INJECTION, SOLUTION INTRAVENOUS PRN
Status: DISCONTINUED | OUTPATIENT
Start: 2025-05-12 | End: 2025-05-14

## 2025-05-12 RX ORDER — SODIUM CHLORIDE 9 MG/ML
INJECTION, SOLUTION INTRAVENOUS
Status: DISCONTINUED | OUTPATIENT
Start: 2025-05-12 | End: 2025-05-12 | Stop reason: SDUPTHER

## 2025-05-12 RX ADMIN — LIDOCAINE HYDROCHLORIDE 100 MG: 20 INJECTION, SOLUTION EPIDURAL; INFILTRATION; INTRACAUDAL; PERINEURAL at 07:45

## 2025-05-12 RX ADMIN — ATORVASTATIN CALCIUM 40 MG: 40 TABLET, FILM COATED ORAL at 20:14

## 2025-05-12 RX ADMIN — PROPOFOL 20 MG: 10 INJECTION, EMULSION INTRAVENOUS at 07:49

## 2025-05-12 RX ADMIN — PHENYLEPHRINE HYDROCHLORIDE 100 MCG: 10 INJECTION INTRAVENOUS at 07:59

## 2025-05-12 RX ADMIN — PANTOPRAZOLE SODIUM 40 MG: 40 INJECTION, POWDER, FOR SOLUTION INTRAVENOUS at 10:08

## 2025-05-12 RX ADMIN — ACETAMINOPHEN 650 MG: 325 TABLET ORAL at 03:40

## 2025-05-12 RX ADMIN — ACETAMINOPHEN 650 MG: 325 TABLET ORAL at 20:14

## 2025-05-12 RX ADMIN — ONDANSETRON 4 MG: 4 TABLET, ORALLY DISINTEGRATING ORAL at 20:14

## 2025-05-12 RX ADMIN — LATANOPROST 1 DROP: 50 SOLUTION OPHTHALMIC at 20:15

## 2025-05-12 RX ADMIN — SODIUM CHLORIDE: 0.9 INJECTION, SOLUTION INTRAVENOUS at 16:31

## 2025-05-12 RX ADMIN — METOPROLOL TARTRATE 50 MG: 50 TABLET, FILM COATED ORAL at 20:14

## 2025-05-12 RX ADMIN — PHENYLEPHRINE HYDROCHLORIDE 300 MCG: 10 INJECTION INTRAVENOUS at 08:01

## 2025-05-12 RX ADMIN — PROPOFOL 40 MG: 10 INJECTION, EMULSION INTRAVENOUS at 07:45

## 2025-05-12 RX ADMIN — SODIUM CHLORIDE: 9 INJECTION, SOLUTION INTRAVENOUS at 07:38

## 2025-05-12 ASSESSMENT — PAIN - FUNCTIONAL ASSESSMENT
PAIN_FUNCTIONAL_ASSESSMENT: NONE - DENIES PAIN
PAIN_FUNCTIONAL_ASSESSMENT: PREVENTS OR INTERFERES SOME ACTIVE ACTIVITIES AND ADLS
PAIN_FUNCTIONAL_ASSESSMENT: NONE - DENIES PAIN
PAIN_FUNCTIONAL_ASSESSMENT: PREVENTS OR INTERFERES SOME ACTIVE ACTIVITIES AND ADLS
PAIN_FUNCTIONAL_ASSESSMENT: NONE - DENIES PAIN

## 2025-05-12 ASSESSMENT — PAIN SCALES - GENERAL
PAINLEVEL_OUTOF10: 5
PAINLEVEL_OUTOF10: 3
PAINLEVEL_OUTOF10: 0
PAINLEVEL_OUTOF10: 0
PAINLEVEL_OUTOF10: 3

## 2025-05-12 ASSESSMENT — PAIN DESCRIPTION - PAIN TYPE: TYPE: ACUTE PAIN

## 2025-05-12 ASSESSMENT — PAIN DESCRIPTION - DESCRIPTORS
DESCRIPTORS: ACHING
DESCRIPTORS: ACHING

## 2025-05-12 ASSESSMENT — COPD QUESTIONNAIRES: CAT_SEVERITY: SEVERE

## 2025-05-12 ASSESSMENT — PAIN DESCRIPTION - LOCATION
LOCATION: LEG
LOCATION: GENERALIZED

## 2025-05-12 ASSESSMENT — PAIN DESCRIPTION - ORIENTATION: ORIENTATION: LEFT

## 2025-05-12 ASSESSMENT — ENCOUNTER SYMPTOMS: SHORTNESS OF BREATH: 1

## 2025-05-12 NOTE — BRIEF OP NOTE
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      Infection Present At Time Of Surgery (PATOS) (choose all levels that have infection present):  No infection present  Other Findings:      Electronically signed by Danny Mcghee MD on 5/12/2025 at 7:59 AM

## 2025-05-12 NOTE — PROGRESS NOTES
Scope #  .  EGD completed, tolerated well. no biopsies taken.  Photos taken. APC completed on stomach settings, grounding pad removed and skin intact. 1 clip placed to control bleeding.

## 2025-05-12 NOTE — CARE COORDINATION
Case Management Assessment Initial Evaluation    Date/Time of Evaluation: 2025 7:20 AM  Assessment Completed by: Melissa Knowles RN    If patient is discharged prior to next notation, then this note serves as note for discharge by case management.    Patient Name: Roberto Prescott                   YOB: 1935  Diagnosis: Anemia [D64.9]  Symptomatic anemia [D64.9]  GI bleed [K92.2]                   Date / Time: 5/10/2025  3:48 AM  [facility] ENDO POOL /NONE     Patient Admission Status: Inpatient   Readmission Risk Low 0-14, Mod 15-19), High > 20: Readmission Risk Score: 20.1    Current PCP: Ramin Asif MD  Health Care Decision Makers:   Primary Decision Maker: Juan Prescott - Agatha - [phone]    Additional Case Management Notes: From ED, Hgb 5.5 on presentation to ED, transfused PRBC's, Hgb is 7.0 today. WBC 17.0. Telemetry, Cardiology, GI consult, order in for PRBC transfusion. Nebs, Zofran prn, IV Protonix.    Procedures:   5/10 ECHO EF 55-60%     EGD pending    Imagin/10 CXR 1. Stable cardiomegaly with interstitial thickening/passive venous   congestion.     Patient Goals/Plan/Treatment Preferences: Roberto currently resides at [facility]. Assessment deferred to . Refer to SW note.

## 2025-05-12 NOTE — PROGRESS NOTES
Admitted to Endo department and admitted to Endo recovery room  Plan of care reviewed with patient.   Call light within reach.   Allergies reviewed with patient  Bed in lowest position, locked, with one bed rail up.   Appropriate arm bands on patient.   Bathroom offered.   All questions and concerns of patient addressed

## 2025-05-12 NOTE — ANESTHESIA PRE PROCEDURE
Department of Anesthesiology  Preprocedure Note       Name:  Roberto Prescott   Age:  89 y.o.  :  1935                                          MRN:  [id number]         Date:  2025      Surgeon: Surgeon(s):  Danny Mcghee MD    Procedure: Procedure(s):  ESOPHAGOGASTRODUODENOSCOPY CONTROL HEMORRHAGE    Medications prior to admission:   Prior to Admission medications    Medication Sig Start Date End Date Taking? Authorizing Provider   traZODone (DESYREL) 150 MG tablet Take 1 tablet by mouth nightly   Yes Jose Marquez MD   atorvastatin (LIPITOR) 40 MG tablet Take 1 tablet by mouth nightly 22   Ramin Asif MD   sodium chloride 1 g tablet Take 1 tablet by mouth 3 times daily (with meals) 3/8/22   Ramin Asif MD   clobetasol propionate 0.05 % GEL gel Apply 1 applicator topically daily    Jose Marquez MD   LACTOBACILLUS PO Take 1 tablet by mouth 2 times daily    Jose Marquez MD   Propylene Glycol (SYSTANE COMPLETE OP) Apply 1 drop to eye 4 times daily    Jose Marquez MD   metoprolol tartrate (LOPRESSOR) 25 MG tablet Take 2 tablets by mouth every 12 hours 21   Ramin Asif MD   candesartan (ATACAND) 16 MG tablet Take 1 tablet by mouth 2 times daily    Jose Marquez MD   vitamin B-12 (CYANOCOBALAMIN) 100 MCG tablet Take 10 tablets by mouth daily    Jose Marquez MD   triamcinolone (KENALOG) 0.1 % cream Apply 0.1 g topically every 12 hours as needed (rash) Apply topically 2 times daily.    Jose Marquez MD   Cholecalciferol (VITAMIN D) 50 MCG ( UT) CAPS capsule Take 1 capsule by mouth daily    Jose Marquez MD   pantoprazole (PROTONIX) 40 MG tablet Take 1 tablet by mouth daily    Jose Marquez MD   SPIRIVA HANDIHALER 18 MCG inhalation capsule INHALE THE CONTENTS OF ONE CAPSULE BY MOUTH ONCE DAILY AS DIRECTED 12/15/16   Michelle Colmenares APRN - CNP   docusate sodium (COLACE) 100 MG capsule TAKE ONE (1)

## 2025-05-12 NOTE — CARE COORDINATION
05/12/25 1422   Service Assessment   Patient Orientation Alert and Oriented;Person;Place;Situation;Self   Cognition Alert   History Provided By Patient   Primary Caregiver Other (Comment)  (ECF)   Accompanied By/Relationship alone   Support Systems Children   Patient's Healthcare Decision Maker is: Legal Next of Kin   PCP Verified by CM Yes   Last Visit to PCP Within last 3 months   Prior Functional Level Assistance with the following:;Bathing;Dressing;Cooking;Housework;Shopping;Mobility;Toileting   Current Functional Level Assistance with the following:;Bathing;Dressing;Cooking;Housework;Shopping;Mobility;Toileting   Can patient return to prior living arrangement Yes   Ability to make needs known: Good   Family able to assist with home care needs: No   Would you like for me to discuss the discharge plan with any other family members/significant others, and if so, who? Yes  (Juan- son)   Financial Resources Medicaid;Medicare   Community Resources ECF/Home Care   Social/Functional History   Lives With Other (Comment)  (ECF)   Type of Home Facility   Active  No   Discharge Planning   Type of Residence Long-Term Care   Living Arrangements Other (Comment)   Current Services Prior To Admission Extended Care Facility;Transportation   Potential Assistance Needed Extended Care Facility;Transportation   DME Ordered? No   Potential Assistance Purchasing Medications No   Type of Home Care Services None   Patient expects to be discharged to: Long-term care   Services At/After Discharge   Transition of Care Consult (CM Consult) N/A   Services At/After Discharge Long term care;In ambulette   Mode of Transport at Discharge [facility]   Condition of Participation: Discharge Planning   The Plan for Transition of Care is related to the following treatment goals: Get better   The Patient and/or Patient Representative was provided with a Choice of Provider? Patient   The Patient and/Or Patient Representative agree with the

## 2025-05-12 NOTE — PROGRESS NOTES
Recovery mode. Patient denies discomfort. Dr. Mcghee discussed findings with patient. Report called to Beatriz BALL on 4A. Discharge instructions provided and understanding verbalized.

## 2025-05-12 NOTE — DISCHARGE INSTR - COC
Continuity of Care Form    Patient Name: Roberto Prescott   :  1935  MRN:  [id number]    Admit date:  5/10/2025  Discharge date:  2025    Code Status Order: Prior   Advance Directives:     Admitting Physician:  Ramin Asif MD  PCP: Ramin Asif MD    Discharging Nurse: LIZZY Henderson  Discharging Hospital Unit/Room#: [facility] ENDO POOL RM/NONE  Discharging Unit Phone Number: [phone]    Emergency Contact:   Extended Emergency Contact Information  Primary Emergency Contact: Juan Prescott   [facility]  Home Phone: [phone]  Relation: Child    Past Surgical History:  Past Surgical History:   Procedure Laterality Date    ABDOMEN SURGERY      ABDOMEN SURGERY N/A 2021    LEFT ABDOMEN WALL WOUND DEBRIDEMENT AND REPAIR performed by Shaka Armendariz MD at [facility] OR    EKG 12-LEAD  2015         FRACTURE SURGERY  unsure    HERNIA REPAIR      Bharati    OTHER SURGICAL HISTORY  10-30-13    abdominal exploration, closure of evisceration with retention sutures-Dr. Calabrese     OTHER SURGICAL HISTORY  10-25-13    Placement of gastrostomy tube-Dr. Calabrese     UPPER GASTROINTESTINAL ENDOSCOPY N/A 2024    ESOPHAGOGASTRODUODENOSCOPY CONTROL HEMORRHAGE performed by Danny Mcghee MD at [facility] ENDOSCOPY       Immunization History:   Immunization History   Administered Date(s) Administered    COVID-19, PFIZER Bivalent, DO NOT Dilute, (age 12y+), IM, 30 mcg/0.3 mL 10/11/2022    COVID-19, PFIZER, , (age 12y+), IM, 30mcg/0.3mL 2024, 10/24/2024    Influenza Virus Vaccine 10/10/2013, 2014, 10/14/2015    Pneumococcal, PCV-13, PREVNAR 13, (age 6w+), IM, 0.5mL 10/14/2015    TDaP, ADACEL (age 10y-64y), BOOSTRIX (age 10y+), IM, 0.5mL 2022       Active Problems:  Patient Active Problem List   Diagnosis Code    COPD (chronic obstructive pulmonary disease) with acute bronchitis (HCC) J44.0, J20.9    Encephalopathy acute G93.40    Muscular deconditioning R29.898       whole    Wound Care Documentation and Therapy:  Wound 05/31/22 Back Lateral;Right (Active)   Number of days: 1077       Wound 05/31/22 Arm Lower;Right 2 Large Skin tears (Active)   Number of days: 1077       Wound 05/31/22 Elbow Right;Posterior (Active)   Number of days: 1077       Wound 05/31/22 Elbow Left;Posterior small skin tear (Active)   Number of days: 1077       Wound 05/31/22 Buttocks Medial (Active)   Number of days: 1077        Elimination:  Continence:   Bowel: Yes  Bladder: Yes  Urinary Catheter: None   Colostomy/Ileostomy/Ileal Conduit: No       Date of Last BM: 5/15/2025    Intake/Output Summary (Last 24 hours) at 5/12/2025 0802  Last data filed at 5/12/2025 0729  Gross per 24 hour   Intake --   Output 2450 ml   Net -2450 ml     I/O last 3 completed shifts:  In: -   Out: 2200 [Urine:2200]    Safety Concerns:     None    Impairments/Disabilities:      None    Nutrition Therapy:  Current Nutrition Therapy:   - Oral Diet:  General    Routes of Feeding: Oral  Liquids: Thin Liquids  Daily Fluid Restriction: no  Last Modified Barium Swallow with Video (Video Swallowing Test): not done    Treatments at the Time of Hospital Discharge:   Respiratory Treatments:  Oxygen Therapy:  is on oxygen at 2 L/min per nasal cannula.  Ventilator:    - No ventilator support    Rehab Therapies:   Weight Bearing Status/Restrictions: No weight bearing restrictions  Other Medical Equipment (for information only, NOT a DME order):  walker  Other Treatments:     Patient's personal belongings (please select all that are sent with patient):  None    RN SIGNATURE:  Electronically signed by Beatriz Gipson RN on 5/16/25 at 11:44 AM EDT    CASE MANAGEMENT/SOCIAL WORK SECTION    Inpatient Status Date: 5/10/2025    Readmission Risk Assessment Score:  [facility] RISK OF UNPLANNED READMISSION 2.0             20.1 Total Score        Discharging to Facility/ Agency   Name: [facility]   Address:[facility] [facility], [place], [place]

## 2025-05-12 NOTE — OP NOTE
[address] [address]                            OPERATIVE REPORT      PATIENT NAME: GARY KHAN              : 1935  MED REC NO: [id number]                       ROOM: [facility]  ACCOUNT NO: [id number]                       ADMIT DATE: 05/10/2025  PROVIDER: Danny Mcghee MD      DATE OF PROCEDURE:  2025    SURGEON:  Danny Mcghee MD    INDICATIONS FOR PROCEDURE:  The patient presented with acute GI blood loss anemia.  The patient is on Xarelto, which is on hold.  Plan today for EGD to evaluate.    ASA CLASSIFICATION:  Please see Anesthesia note.    ESTIMATED BLOOD LOSS:  None.    PROCEDURE PERFORMED:  Esophagogastroduodenoscopy with control of bleeding using argon plasma coagulator as well as Resolution clip.    DESCRIPTION OF PROCEDURE:  The patient was brought to the GI lab.  Consent obtained.  Risks involved with the procedure were explained to the patient.  Informed consent was obtained.  The patient was monitored during the procedure with pulse oximetry, blood pressure monitoring, and oxygen by nasal cannula.  Sedation mainly consisted of IV propofol given.  Sedation achieved with monitored anesthesia care.  For ASA classification and medication given during the procedure, please see Anesthesia note.    EGD with control of bleeding using argon plasma coagulator as well as Resolution clip.     The patient was placed in left lateral decubitus position.  Upper scope was advanced under direct visualization from the oral cavity up to the duodenum.  The esophagus appeared normal.  Scope advanced to the stomach.  Seen in the posterior wall vascular malformation, which had wet clot in it.  With washing, started to bleed, likely the source of the GI bleed.  Scope was advanced to the antrum, which appeared normal.  Scope was advanced to the duodenum, which appeared normal.  Scope was withdrawn to the body of the stomach.

## 2025-05-12 NOTE — PLAN OF CARE
Problem: Chronic Conditions and Co-morbidities  Goal: Patient's chronic conditions and co-morbidity symptoms are monitored and maintained or improved  5/12/2025 0215 by Danuta Kaur RN  Outcome: Progressing  Flowsheets (Taken 5/12/2025 0215)  Care Plan - Patient's Chronic Conditions and Co-Morbidity Symptoms are Monitored and Maintained or Improved:   Monitor and assess patient's chronic conditions and comorbid symptoms for stability, deterioration, or improvement   Collaborate with multidisciplinary team to address chronic and comorbid conditions and prevent exacerbation or deterioration   Update acute care plan with appropriate goals if chronic or comorbid symptoms are exacerbated and prevent overall improvement and discharge     Problem: Discharge Planning  Goal: Discharge to home or other facility with appropriate resources  5/12/2025 0215 by Danuta Kaur RN  Outcome: Progressing  Flowsheets (Taken 5/12/2025 0215)  Discharge to home or other facility with appropriate resources: Identify barriers to discharge with patient and caregiver     Problem: Pain  Goal: Verbalizes/displays adequate comfort level or baseline comfort level  5/12/2025 0215 by Danuta Kaur, RN  Outcome: Progressing  Flowsheets (Taken 5/12/2025 0215)  Verbalizes/displays adequate comfort level or baseline comfort level:   Encourage patient to monitor pain and request assistance   Administer analgesics based on type and severity of pain and evaluate response   Consider cultural and social influences on pain and pain management   Assess pain using appropriate pain scale   Implement non-pharmacological measures as appropriate and evaluate response   Notify Licensed Independent Practitioner if interventions unsuccessful or patient reports new pain     Problem: Skin/Tissue Integrity  Goal: Skin integrity remains intact  Description: 1.  Monitor for areas of redness and/or skin breakdown2.  Assess vascular access sites hourly3.  Every 4-6  hours minimum:  Change oxygen saturation probe site4.  Every 4-6 hours:  If on nasal continuous positive airway pressure, respiratory therapy assess nares and determine need for appliance change or resting period  5/12/2025 0215 by Danuta Kaur RN  Outcome: Progressing  Flowsheets (Taken 5/12/2025 0215)  Skin Integrity Remains Intact: Monitor for areas of redness and/or skin breakdown     Problem: Safety - Adult  Goal: Free from fall injury  5/12/2025 0215 by Danuta Kaur RN  Outcome: Progressing  Flowsheets (Taken 5/12/2025 0215)  Free From Fall Injury:   Instruct family/caregiver on patient safety   Based on caregiver fall risk screen, instruct family/caregiver to ask for assistance with transferring infant if caregiver noted to have fall risk factors     Problem: ABCDS Injury Assessment  Goal: Absence of physical injury  5/12/2025 0215 by Danuta Kaur RN  Outcome: Progressing  Flowsheets (Taken 5/12/2025 0215)  Absence of Physical Injury: Implement safety measures based on patient assessment     Problem: Infection - Adult  Goal: Absence of infection at discharge  5/12/2025 0215 by Danuta Kaur RN  Outcome: Progressing  Flowsheets (Taken 5/12/2025 0215)  Absence of infection at discharge:   Assess and monitor for signs and symptoms of infection   Monitor lab/diagnostic results     Problem: Hematologic - Adult  Goal: Maintains hematologic stability  5/12/2025 0215 by Danuta Kaur RN  Outcome: Progressing  Flowsheets (Taken 5/12/2025 0215)  Maintains hematologic stability: Assess for signs and symptoms of bleeding or hemorrhage   Careplan reviewed with patient and family. Patient and family verbalized understanding of the plan of care.

## 2025-05-12 NOTE — ANESTHESIA POSTPROCEDURE EVALUATION
Department of Anesthesiology  Postprocedure Note    Patient: Roberto Prescott  MRN: [id number]  YOB: 1935  Date of evaluation: 5/12/2025    Procedure Summary       Date: 05/12/25 Room / Location: [place] [address] / [facility]    Anesthesia Start: 0738 Anesthesia Stop: 0804    Procedure: ESOPHAGOGASTRODUODENOSCOPY CONTROL HEMORRHAGE Diagnosis:       Gastrointestinal hemorrhage, unspecified gastrointestinal hemorrhage type      (Gastrointestinal hemorrhage, unspecified gastrointestinal hemorrhage type [K92.2])    Surgeons: Danny Mcghee MD Responsible Provider: Vinnie Mcnamara DO    Anesthesia Type: MAC ASA Status: 4            Anesthesia Type: No value filed.    Елена Phase I: Елена Score: 9    Елена Phase II:      Anesthesia Post Evaluation    Patient location during evaluation: bedside  Patient participation: complete - patient participated  Level of consciousness: awake and alert  Airway patency: patent  Nausea & Vomiting: no nausea and no vomiting  Cardiovascular status: hemodynamically stable and blood pressure returned to baseline  Respiratory status: acceptable, spontaneous ventilation, nonlabored ventilation and room air  Hydration status: stable  Pain management: adequate        No notable events documented.

## 2025-05-12 NOTE — PROGRESS NOTES
INTERNAL MEDICINE Progress Note  5/12/2025 3:35 PM  Subjective:   Admit Date: 5/10/2025  PCP: Karly Hoffman MD  Interval History: Resumption of care  89-year-old gentleman admitted with melena / GI bleed, required 2 units of packed red cell transfusion.  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  On ice chips p.o.  Patient denies abdominal pain, no dizziness.  No nausea, no vomiting.      Objective:   Vitals: BP 91/78   Pulse 59   Temp 97.5 °F (36.4 °C) (Oral)   Resp 16   Ht 1.854 m (6' 1\")   Wt 106.1 kg (234 lb)   SpO2 92%   BMI 30.87 kg/m²   General appearance: alert and cooperative with exam  HEENT: Head: atraumatic  Neck: no adenopathy, no carotid bruit, and no JVD  Lungs: clear to auscultation bilaterally  Heart: regular rate and rhythm, S1, S2 normal, no murmur, click, rub or gallop  Abdomen: normal findings: bowel sounds normal, no masses palpable, soft, non-tender, and symmetric  Extremities: no edema, redness or tenderness in the calves or thighs  Neurologic: Mental status: Alert, oriented, thought content appropriate      Medications:   Scheduled Meds:   [Held by provider] digoxin  125 mcg Oral Daily    [Held by provider] rivaroxaban  20 mg Oral Daily with breakfast    [Held by provider] spironolactone  25 mg Oral Daily    [Held by provider] traZODone  50 mg Oral Nightly    pantoprazole  40 mg IntraVENous Daily    atorvastatin  40 mg Oral Nightly    dilTIAZem  120 mg Oral Daily    latanoprost  1 drop Both Eyes Nightly    metoprolol tartrate  50 mg Oral Q12H     Continuous Infusions:   sodium chloride      sodium chloride         Lab Results:   CBC:   Recent Labs     05/10/25  0350 05/10/25  1456 05/11/25  0143 05/11/25  0801 05/11/25  2013 05/12/25  0212 05/12/25  1415   WBC 17.1*  --  17.0*  --   --   --   --    HGB 5.5*   < > 7.6*   < > 7.3* 7.0* 8.6*     --  223  --   --   --

## 2025-05-12 NOTE — PROGRESS NOTES
Cardiology Progress Note      Patient:  Roberto Prescott  YOB: 1935  MRN: [id number]   Acct: [id number]  Admit Date:  5/10/2025  Primary Cardiologist: Dr. Arreaga   Seen by dr. Diego     Per prior cardiology consult note-  REASON FOR CONSULT:    positive troponin       A Fib, anemia     CHIEF COMPLAINT:    Shortness of breath      HISTORY OF PRESENT ILLNESS:    Roberto Prescott is a pleasant 89 year old male patient with past medical history that includes:   Past Medical History        Past Medical History:   Diagnosis Date    Arthritis      Asthma      Atrial fibrillation (HCC)      CHF (congestive heart failure) (HCC)      COPD (chronic obstructive pulmonary disease) (HCC)      Encephalopathy acute 11/20/2013    Hyperlipidemia      Hypertension      Muscular deconditioning 12/23/2013    Pneumonia        Echocardiogram 9/2024 revealed EF of 50-55%, mild to moderate aortic stenosis, mild MR, bi-atrial enlargement. He has history of pacemaker placement, atrial fibrillation, on Xarelto. Patient is followed by Dr Osborn as outpatient. The patient was admitted to the hospital after he presented with fatigue, shortness of breath and tachypnea. Patient denies chest pain, orthopnea, paroxysmal nocturnal dyspnea, palpitations, dizziness, syncope, recent weight gain or leg swelling. Patient has reported diarrhea with dark stools. Cardiology was consulted for elevated Troponin. HS Troponin today was 159. He seems to have chronic Troponin elevation. HS Troponin on prior admission on 9/2024 was as high as 200. He continues to deny chest pain. EKG revealed atrial fibrillation, ST depression and TWI in inferior and anterolateral leads. EKG changes are chronic and when compared to old EKG, no new significant changes were noted. Creatinine 1.3. NT-Pro-BNP 2,975. Xarelto was held after severe anemia was noted. Hgb on admission was 5.5, blood transfusion was ordered.     Subjective (Events in last 24 hours):   Pt in

## 2025-05-13 LAB
ANION GAP SERPL CALC-SCNC: 10 MEQ/L (ref 8–16)
BUN SERPL-MCNC: 20 MG/DL (ref 8–23)
CALCIUM SERPL-MCNC: 8.2 MG/DL (ref 8.8–10.2)
CHLORIDE SERPL-SCNC: 103 MEQ/L (ref 98–111)
CO2 SERPL-SCNC: 19 MEQ/L (ref 22–29)
CREAT SERPL-MCNC: 0.9 MG/DL (ref 0.7–1.2)
EKG ATRIAL RATE: 66 BPM
EKG Q-T INTERVAL: 510 MS
EKG QRS DURATION: 190 MS
EKG QTC CALCULATION (BAZETT): 517 MS
EKG R AXIS: 73 DEGREES
EKG T AXIS: -127 DEGREES
EKG VENTRICULAR RATE: 62 BPM
GFR SERPL CREATININE-BSD FRML MDRD: 82 ML/MIN/1.73M2
GLUCOSE SERPL-MCNC: 79 MG/DL (ref 74–109)
HCT VFR BLD AUTO: 23.5 % (ref 42–52)
HCT VFR BLD AUTO: 24.7 % (ref 42–52)
HCT VFR BLD AUTO: 25.7 % (ref 42–52)
HGB BLD-MCNC: 7.9 GM/DL (ref 14–18)
HGB BLD-MCNC: 8.1 GM/DL (ref 14–18)
HGB BLD-MCNC: 8.4 GM/DL (ref 14–18)
POTASSIUM SERPL-SCNC: 4.5 MEQ/L (ref 3.5–5.2)
SODIUM SERPL-SCNC: 132 MEQ/L (ref 135–145)

## 2025-05-13 PROCEDURE — 1200000003 HC TELEMETRY R&B

## 2025-05-13 PROCEDURE — 94761 N-INVAS EAR/PLS OXIMETRY MLT: CPT

## 2025-05-13 PROCEDURE — 85018 HEMOGLOBIN: CPT

## 2025-05-13 PROCEDURE — 2700000000 HC OXYGEN THERAPY PER DAY

## 2025-05-13 PROCEDURE — 1200000000 HC SEMI PRIVATE

## 2025-05-13 PROCEDURE — 6370000000 HC RX 637 (ALT 250 FOR IP): Performed by: INTERNAL MEDICINE

## 2025-05-13 PROCEDURE — 97535 SELF CARE MNGMENT TRAINING: CPT

## 2025-05-13 PROCEDURE — 85014 HEMATOCRIT: CPT

## 2025-05-13 PROCEDURE — 97530 THERAPEUTIC ACTIVITIES: CPT

## 2025-05-13 PROCEDURE — 94640 AIRWAY INHALATION TREATMENT: CPT

## 2025-05-13 PROCEDURE — 93010 ELECTROCARDIOGRAM REPORT: CPT | Performed by: INTERNAL MEDICINE

## 2025-05-13 PROCEDURE — 6360000002 HC RX W HCPCS: Performed by: INTERNAL MEDICINE

## 2025-05-13 PROCEDURE — 80048 BASIC METABOLIC PNL TOTAL CA: CPT

## 2025-05-13 PROCEDURE — 93005 ELECTROCARDIOGRAM TRACING: CPT | Performed by: INTERNAL MEDICINE

## 2025-05-13 PROCEDURE — 36415 COLL VENOUS BLD VENIPUNCTURE: CPT

## 2025-05-13 PROCEDURE — 2580000003 HC RX 258: Performed by: INTERNAL MEDICINE

## 2025-05-13 PROCEDURE — 97166 OT EVAL MOD COMPLEX 45 MIN: CPT

## 2025-05-13 RX ORDER — SPIRONOLACTONE 25 MG/1
25 TABLET ORAL DAILY
Status: DISCONTINUED | OUTPATIENT
Start: 2025-05-13 | End: 2025-05-16 | Stop reason: HOSPADM

## 2025-05-13 RX ORDER — PANTOPRAZOLE SODIUM 40 MG/1
40 TABLET, DELAYED RELEASE ORAL
Status: DISCONTINUED | OUTPATIENT
Start: 2025-05-13 | End: 2025-05-16 | Stop reason: HOSPADM

## 2025-05-13 RX ADMIN — PANTOPRAZOLE SODIUM 40 MG: 40 TABLET, DELAYED RELEASE ORAL at 16:34

## 2025-05-13 RX ADMIN — PANTOPRAZOLE SODIUM 40 MG: 40 INJECTION, POWDER, FOR SOLUTION INTRAVENOUS at 08:43

## 2025-05-13 RX ADMIN — PANTOPRAZOLE SODIUM 40 MG: 40 TABLET, DELAYED RELEASE ORAL at 12:31

## 2025-05-13 RX ADMIN — LATANOPROST 1 DROP: 50 SOLUTION OPHTHALMIC at 20:29

## 2025-05-13 RX ADMIN — ACETAMINOPHEN 650 MG: 325 TABLET ORAL at 20:29

## 2025-05-13 RX ADMIN — METOPROLOL TARTRATE 50 MG: 50 TABLET, FILM COATED ORAL at 20:29

## 2025-05-13 RX ADMIN — SPIRONOLACTONE 25 MG: 25 TABLET ORAL at 12:31

## 2025-05-13 RX ADMIN — SODIUM CHLORIDE: 0.9 INJECTION, SOLUTION INTRAVENOUS at 04:12

## 2025-05-13 RX ADMIN — METOPROLOL TARTRATE 50 MG: 50 TABLET, FILM COATED ORAL at 08:43

## 2025-05-13 RX ADMIN — ATORVASTATIN CALCIUM 40 MG: 40 TABLET, FILM COATED ORAL at 20:29

## 2025-05-13 RX ADMIN — DILTIAZEM HYDROCHLORIDE 120 MG: 120 CAPSULE, EXTENDED RELEASE ORAL at 08:43

## 2025-05-13 RX ADMIN — ALBUTEROL SULFATE 2.5 MG: 2.5 SOLUTION RESPIRATORY (INHALATION) at 22:16

## 2025-05-13 ASSESSMENT — PAIN SCALES - GENERAL
PAINLEVEL_OUTOF10: 3
PAINLEVEL_OUTOF10: 0
PAINLEVEL_OUTOF10: 0

## 2025-05-13 ASSESSMENT — PAIN DESCRIPTION - DESCRIPTORS: DESCRIPTORS: ACHING

## 2025-05-13 ASSESSMENT — PAIN DESCRIPTION - LOCATION: LOCATION: GENERALIZED

## 2025-05-13 ASSESSMENT — PAIN - FUNCTIONAL ASSESSMENT: PAIN_FUNCTIONAL_ASSESSMENT: ACTIVITIES ARE NOT PREVENTED

## 2025-05-13 NOTE — CARE COORDINATION
5/13/25, 11:25 AM EDT    DISCHARGE ON GOING EVALUATION    Roberto CASANDRA [facility] day: 3  Location: -03/003-A Reason for admit: Anemia [D64.9]  Symptomatic anemia [D64.9]  GI bleed [K92.2]     Procedures:   5/10 ECHO EF 55-60%     5/12 EGD     Imaging since last note: none    Barriers to Discharge: Hgb 8.1, I following, Cardiology has signed off. PT/OT, full liquid diet, nebs, Zofran prn, IV Protonix.    PCP: Karly Hoffman MD  Readmission Risk Score: 19.3    Patient Goals/Plan/Treatment Preferences: Plan is to return to [facility]. SW following.

## 2025-05-13 NOTE — PLAN OF CARE
Problem: Chronic Conditions and Co-morbidities  Goal: Patient's chronic conditions and co-morbidity symptoms are monitored and maintained or improved  Outcome: Progressing  Flowsheets (Taken 5/12/2025 2248)  Care Plan - Patient's Chronic Conditions and Co-Morbidity Symptoms are Monitored and Maintained or Improved:   Monitor and assess patient's chronic conditions and comorbid symptoms for stability, deterioration, or improvement   Collaborate with multidisciplinary team to address chronic and comorbid conditions and prevent exacerbation or deterioration   Update acute care plan with appropriate goals if chronic or comorbid symptoms are exacerbated and prevent overall improvement and discharge     Problem: Discharge Planning  Goal: Discharge to home or other facility with appropriate resources  5/12/2025 2248 by Danuta Kaur RN  Outcome: Progressing  Flowsheets (Taken 5/12/2025 2248)  Discharge to home or other facility with appropriate resources: Identify barriers to discharge with patient and caregiver     Problem: Pain  Goal: Verbalizes/displays adequate comfort level or baseline comfort level  Outcome: Progressing  Flowsheets (Taken 5/12/2025 2248)  Verbalizes/displays adequate comfort level or baseline comfort level:   Encourage patient to monitor pain and request assistance   Administer analgesics based on type and severity of pain and evaluate response   Consider cultural and social influences on pain and pain management   Assess pain using appropriate pain scale   Implement non-pharmacological measures as appropriate and evaluate response   Notify Licensed Independent Practitioner if interventions unsuccessful or patient reports new pain     Problem: Skin/Tissue Integrity  Goal: Skin integrity remains intact  Description: 1.  Monitor for areas of redness and/or skin breakdown2.  Assess vascular access sites hourly3.  Every 4-6 hours minimum:  Change oxygen saturation probe site4.  Every 4-6 hours:  If

## 2025-05-13 NOTE — PROGRESS NOTES
Gastroenterology  Progress Note    5/13/2025 5:27 PM  Subjective:   Admit Date: 5/10/2025    Interval History: Patient presented with acute GI bleed with anemia due to impart anticoagulations required blood transfusions.  Anticoagulation is on hold at this time his last bowel movement today is still black.  He is H&H stable without transfusions currently his PPI plan for diagnostic EGD tomorrow morning.    5/14/25 discussed the finding with the EGD vascular malformation was bleeding.  Argon plasma coagulator as a residual clip patient continues to be on PPI Xarelto on hold at this time H&H stable.  No plan for close at the patient age group at this time    Diet: ADULT DIET; Full Liquid    Medications:   Scheduled Meds:    pantoprazole  40 mg Oral BID AC    spironolactone  25 mg Oral Daily    [Held by provider] digoxin  125 mcg Oral Daily    [Held by provider] rivaroxaban  20 mg Oral Daily with breakfast    [Held by provider] traZODone  50 mg Oral Nightly    atorvastatin  40 mg Oral Nightly    dilTIAZem  120 mg Oral Daily    latanoprost  1 drop Both Eyes Nightly    metoprolol tartrate  50 mg Oral Q12H     Continuous Infusions:    sodium chloride      sodium chloride         CBC:   Recent Labs     05/11/25  0143 05/11/25  0801 05/13/25  0207 05/13/25  0814 05/13/25  1406   WBC 17.0*  --   --   --   --    HGB 7.6*   < > 7.9* 8.1* 8.4*     --   --   --   --     < > = values in this interval not displayed.     BMP:    Recent Labs     05/11/25  0143 05/13/25  0207   * 132*   K 5.0 4.5    103   CO2 18* 19*   BUN 35* 20   CREATININE 1.1 0.9   GLUCOSE 102 79     Hepatic:   No results for input(s): \"AST\", \"ALT\", \"BILITOT\", \"ALKPHOS\" in the last 72 hours.    Invalid input(s): \"ALB\"    INR:   No results for input(s): \"INR\" in the last 72 hours.      Imaging:  No results found for this or any previous visit.    Results for orders placed during the hospital encounter of 11/13/16    CT ABDOMEN PELVIS W IV

## 2025-05-13 NOTE — PROGRESS NOTES
INTERNAL MEDICINE Progress Note  5/13/2025 11:30 AM  Subjective:   Admit Date: 5/10/2025  PCP: Karly Hoffman MD  Interval History:   89-year-old gentleman admitted with melena / GI bleed, required 2 units of packed red cell transfusion.  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  On ice chips p.o.  Patient denies abdominal pain, no dizziness.  No nausea, no vomiting.    5/13/25  No new c/o  No BM, no hematemesis  VSS, H/H stable    Objective:   Vitals: BP (!) 126/49   Pulse 60   Temp 97.9 °F (36.6 °C) (Oral)   Resp 18   Ht 1.854 m (6' 1\")   Wt 109.1 kg (240 lb 8.4 oz)   SpO2 100%   BMI 31.73 kg/m²   General appearance: alert and cooperative with exam  HEENT:  atraumatic  Neck: no adenopathy, no carotid bruit, and no JVD  Lungs: clear to auscultation bilaterally  Heart: regular rate and rhythm, S1, S2 normal, no murmur, click, rub or gallop  Abdomen: normal findings: bowel sounds normal, no masses palpable, soft, non-tender, and symmetric  Extremities: no edema, redness or tenderness in the calves or thighs  Neurologic:Alert, oriented, thought content appropriate      Medications:   Scheduled Meds:   [Held by provider] digoxin  125 mcg Oral Daily    [Held by provider] rivaroxaban  20 mg Oral Daily with breakfast    [Held by provider] spironolactone  25 mg Oral Daily    [Held by provider] traZODone  50 mg Oral Nightly    pantoprazole  40 mg IntraVENous Daily    atorvastatin  40 mg Oral Nightly    dilTIAZem  120 mg Oral Daily    latanoprost  1 drop Both Eyes Nightly    metoprolol tartrate  50 mg Oral Q12H     Continuous Infusions:   sodium chloride      sodium chloride         Lab Results:   CBC:   Recent Labs     05/11/25  0143 05/11/25  0801 05/12/25  1942 05/13/25  0207 05/13/25  0814   WBC 17.0*  --   --   --   --    HGB 7.6*   < > 8.6* 7.9* 8.1*     --   --   --   --     < > = values in

## 2025-05-13 NOTE — PROGRESS NOTES
Pt was in bed as his nurse was caring for him. He was dealing with anemia. He was encouraged and blessed.    05/13/25 1429   Encounter Summary   Encounter Overview/Reason Initial Encounter   Service Provided For Patient   Referral/Consult From [facility]   Support System Family members   Last Encounter  05/13/25   Complexity of Encounter Low   Begin Time 0900   End Time  0906   Total Time Calculated 6 min   Spiritual/Emotional needs   Type Spiritual Support   Assessment/Intervention/Outcome   Assessment Hopeful   Intervention Empowerment   Outcome Encouraged

## 2025-05-13 NOTE — PROGRESS NOTES
[facility]  INPATIENT OCCUPATIONAL THERAPY  [facility] NEUROSCIENCES 4A  EVALUATION      Discharge Recommendations: Long Term Care with OT  Equipment Recommendations: No defer to next level of care    Time In: 1351  Time Out: 1430  Timed Code Treatment Minutes: 24 Minutes  Minutes: 39          Date: 2025  Patient Name: Roberto Prescott,   Gender: male      MRN: [id number]  : 1935  (89 y.o.)  Referring Practitioner: Ramin Asif MD  Diagnosis: Anemia  Additional Pertinent Hx: Per H&P: his is a very pleasant 89 y.o. male who was admitted to the hospital with a chief complaints of shortness of breath. He lives with son. He has hx of CHF atrial fibrillation on xarelto was found to have severe anemia. He denies of vomiting of blood. Has been having gas. Reports of dark stool. He has shortness of breath and gets short of breath on exertion. He had a fall over three wks ago and had broken his forearm. Pt found to have acute GI bleed s/p PRBC. 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 treated with argon plasma coagulator and application of resolution clip with control of bleeding on 25    Restrictions/Precautions:  Restrictions/Precautions: Fall Risk, Weight Bearing  Left Upper Extremity Weight Bearing: Non Weight Bearing  Position Activity Restriction  Other Position/Activity Restrictions: L forearm fx ~3 weeks ago    Subjective  Chart Reviewed: Yes, Orders, Progress Notes, History and Physical  Patient assessed for rehabilitation services?: Yes  Family / Caregiver Present: Yes (sisters present initially although departed when session began)    Subjective: Pt seated in bed upon arrival, agreeable to OT session. Pt noted to have L cast on forearm.  Comments: Pt reported has had cast for ~3 weeks, had removed, bones reset, and then placed new cast stating \"they told me the bones weren't healing right.\" Further reinforced importance of adhering to NWB precautions to promote  for upright posture. Pt demoes fair carryover with adhering to NWB to LUE.    BED MOBILITY:  Supine to Sit: Minimal Assistance, with head of bed raised, with rail, with verbal cues , with increased time for completion ; assist to elevate trunk/shoulders off bed  Scooting: Moderate Assistance, with increased time for completion ; advancing hips forward to EOB. Pt required moderate assistance to avoid pushing through LUE    TRANSFERS:  Sit to Stand:  Minimal Assistance, with increased time for completion, cues for hand placement, with verbal cues, decreased force production. ; transfer completed from partially elevated EOB  Stand to Sit: Moderate Assistance, with increased time for completion, cues for hand placement, with verbal cues, poor eccentric control, cues for alignment to surface and for safety with assistive device.    ** Moderate cues for NWB to LUE.    FUNCTIONAL MOBILITY:  Assistive Device: Rolling Walker  Assist Level:  Minimal Assistance, with verbal cues , and with increased time for completion.   Distance: Within room (~5 ft X2)  **Unsteady, poor safety with RW. Pt demoes fair compliance with NWB through LUE, primarily only using LUE to assist with balance.    **Increased SOB noted with exertion during fxnl mobility, moderate cuing required for pursed lip breathing during with poor carryover of proper technique.    Activity Tolerance:  Patient tolerance of  treatment: Fair treatment tolerance, Limited by fatigue, Reduced activity pace, and Need for increased rest breaks       AM-PAC Inpatient Daily Activity Raw Score: 14     Modified [place]:  Premorbid Functional Status: Not Applicable  Current Functional Status:  Not Applicable    Education:  Learners: Patient  Plan of Care, Role of OT,ADL's, Importance of Increasing Activity, and transfer training, up in chair for meals. , precautions    Assessment:  Assessment: Pt presents requiring increased assistance for ADLs, transfers, and functional mobility

## 2025-05-13 NOTE — PROCEDURES
PROCEDURE NOTE  Date: 5/13/2025   Name: Roberto Prescott  YOB: 1935    Procedures  12 lead EKG completed. Results handed to Danuta BALL.

## 2025-05-14 LAB
ANION GAP SERPL CALC-SCNC: 9 MEQ/L (ref 8–16)
BUN SERPL-MCNC: 17 MG/DL (ref 8–23)
CALCIUM SERPL-MCNC: 8.4 MG/DL (ref 8.8–10.2)
CHLORIDE SERPL-SCNC: 100 MEQ/L (ref 98–111)
CO2 SERPL-SCNC: 20 MEQ/L (ref 22–29)
CREAT SERPL-MCNC: 1.1 MG/DL (ref 0.7–1.2)
DEPRECATED RDW RBC AUTO: 54.8 FL (ref 35–45)
ERYTHROCYTE [DISTWIDTH] IN BLOOD BY AUTOMATED COUNT: 17.1 % (ref 11.5–14.5)
GFR SERPL CREATININE-BSD FRML MDRD: 65 ML/MIN/1.73M2
GLUCOSE SERPL-MCNC: 118 MG/DL (ref 74–109)
HCT VFR BLD AUTO: 25.4 % (ref 42–52)
HGB BLD-MCNC: 8.5 GM/DL (ref 14–18)
MCH RBC QN AUTO: 30.5 PG (ref 26–33)
MCHC RBC AUTO-ENTMCNC: 33.5 GM/DL (ref 32.2–35.5)
MCV RBC AUTO: 91 FL (ref 80–94)
PLATELET # BLD AUTO: 238 THOU/MM3 (ref 130–400)
PMV BLD AUTO: 10.6 FL (ref 9.4–12.4)
POTASSIUM SERPL-SCNC: 4.5 MEQ/L (ref 3.5–5.2)
RBC # BLD AUTO: 2.79 MILL/MM3 (ref 4.7–6.1)
SODIUM SERPL-SCNC: 129 MEQ/L (ref 135–145)
WBC # BLD AUTO: 12.8 THOU/MM3 (ref 4.8–10.8)

## 2025-05-14 PROCEDURE — 80048 BASIC METABOLIC PNL TOTAL CA: CPT

## 2025-05-14 PROCEDURE — 6370000000 HC RX 637 (ALT 250 FOR IP): Performed by: INTERNAL MEDICINE

## 2025-05-14 PROCEDURE — 97116 GAIT TRAINING THERAPY: CPT

## 2025-05-14 PROCEDURE — 97162 PT EVAL MOD COMPLEX 30 MIN: CPT

## 2025-05-14 PROCEDURE — 97530 THERAPEUTIC ACTIVITIES: CPT

## 2025-05-14 PROCEDURE — 36415 COLL VENOUS BLD VENIPUNCTURE: CPT

## 2025-05-14 PROCEDURE — 85027 COMPLETE CBC AUTOMATED: CPT

## 2025-05-14 PROCEDURE — 1200000000 HC SEMI PRIVATE

## 2025-05-14 RX ADMIN — METOPROLOL TARTRATE 50 MG: 50 TABLET, FILM COATED ORAL at 09:45

## 2025-05-14 RX ADMIN — DILTIAZEM HYDROCHLORIDE 120 MG: 120 CAPSULE, EXTENDED RELEASE ORAL at 09:45

## 2025-05-14 RX ADMIN — SPIRONOLACTONE 25 MG: 25 TABLET ORAL at 09:45

## 2025-05-14 RX ADMIN — PANTOPRAZOLE SODIUM 40 MG: 40 TABLET, DELAYED RELEASE ORAL at 04:28

## 2025-05-14 RX ADMIN — ACETAMINOPHEN 650 MG: 325 TABLET ORAL at 23:14

## 2025-05-14 RX ADMIN — PANTOPRAZOLE SODIUM 40 MG: 40 TABLET, DELAYED RELEASE ORAL at 16:18

## 2025-05-14 RX ADMIN — LATANOPROST 1 DROP: 50 SOLUTION OPHTHALMIC at 20:04

## 2025-05-14 RX ADMIN — ACETAMINOPHEN 650 MG: 325 TABLET ORAL at 09:50

## 2025-05-14 RX ADMIN — ATORVASTATIN CALCIUM 40 MG: 40 TABLET, FILM COATED ORAL at 20:04

## 2025-05-14 ASSESSMENT — PAIN DESCRIPTION - DESCRIPTORS: DESCRIPTORS: ACHING;DISCOMFORT

## 2025-05-14 ASSESSMENT — PAIN SCALES - GENERAL: PAINLEVEL_OUTOF10: 3

## 2025-05-14 ASSESSMENT — PAIN DESCRIPTION - ONSET: ONSET: ON-GOING

## 2025-05-14 ASSESSMENT — PAIN DESCRIPTION - PAIN TYPE: TYPE: ACUTE PAIN

## 2025-05-14 ASSESSMENT — PAIN DESCRIPTION - FREQUENCY: FREQUENCY: INTERMITTENT

## 2025-05-14 ASSESSMENT — PAIN DESCRIPTION - ORIENTATION: ORIENTATION: LEFT

## 2025-05-14 ASSESSMENT — PAIN - FUNCTIONAL ASSESSMENT: PAIN_FUNCTIONAL_ASSESSMENT: ACTIVITIES ARE NOT PREVENTED

## 2025-05-14 ASSESSMENT — PAIN DESCRIPTION - LOCATION: LOCATION: SHOULDER

## 2025-05-14 NOTE — PLAN OF CARE
Problem: Chronic Conditions and Co-morbidities  Goal: Patient's chronic conditions and co-morbidity symptoms are monitored and maintained or improved  Outcome: Progressing  Flowsheets (Taken 5/13/2025 2215)  Care Plan - Patient's Chronic Conditions and Co-Morbidity Symptoms are Monitored and Maintained or Improved:   Monitor and assess patient's chronic conditions and comorbid symptoms for stability, deterioration, or improvement   Collaborate with multidisciplinary team to address chronic and comorbid conditions and prevent exacerbation or deterioration   Update acute care plan with appropriate goals if chronic or comorbid symptoms are exacerbated and prevent overall improvement and discharge     Problem: Discharge Planning  Goal: Discharge to home or other facility with appropriate resources  Outcome: Progressing  Flowsheets (Taken 5/13/2025 2215)  Discharge to home or other facility with appropriate resources: Identify barriers to discharge with patient and caregiver     Problem: Pain  Goal: Verbalizes/displays adequate comfort level or baseline comfort level  Outcome: Progressing  Flowsheets (Taken 5/13/2025 2215)  Verbalizes/displays adequate comfort level or baseline comfort level:   Encourage patient to monitor pain and request assistance   Administer analgesics based on type and severity of pain and evaluate response   Consider cultural and social influences on pain and pain management   Assess pain using appropriate pain scale   Implement non-pharmacological measures as appropriate and evaluate response   Notify Licensed Independent Practitioner if interventions unsuccessful or patient reports new pain     Problem: Skin/Tissue Integrity  Goal: Skin integrity remains intact  Description: 1.  Monitor for areas of redness and/or skin breakdown2.  Assess vascular access sites hourly3.  Every 4-6 hours minimum:  Change oxygen saturation probe site4.  Every 4-6 hours:  If on nasal continuous positive airway

## 2025-05-14 NOTE — PROGRESS NOTES
INTERNAL MEDICINE Progress Note  5/14/2025 5:19 PM  Subjective:   Admit Date: 5/10/2025  PCP: Karly Hoffman MD  Interval History:   89-year-old gentleman admitted with melena / GI bleed, required 2 units of packed red cell transfusion.  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  On ice chips p.o.  Patient denies abdominal pain, no dizziness.  No nausea, no vomiting.    5/13/25  No new c/o  No BM, no hematemesis  VSS, H/H stable    5/14/25  Tolerating regular meals.  No melena no hematochezia  Stable H&H.    Objective:   Vitals: /86   Pulse 62   Temp 98.6 °F (37 °C) (Oral)   Resp 18   Ht 1.854 m (6' 1\")   Wt 109.1 kg (240 lb 8.4 oz)   SpO2 100%   BMI 31.73 kg/m²   General appearance: alert and cooperative with exam  HEENT:  atraumatic  Neck: no adenopathy, no carotid bruit, and no JVD  Lungs: clear to auscultation bilaterally  Heart: regular rate and rhythm, S1, S2 normal, no murmur, click, rub or gallop  Abdomen: normal findings: bowel sounds normal, no masses palpable, soft, non-tender, and symmetric  Extremities: no edema, redness or tenderness in the calves or thighs  Neurologic:Alert, oriented, thought content appropriate      Medications:   Scheduled Meds:   pantoprazole  40 mg Oral BID AC    spironolactone  25 mg Oral Daily    [Held by provider] digoxin  125 mcg Oral Daily    [Held by provider] rivaroxaban  20 mg Oral Daily with breakfast    [Held by provider] traZODone  50 mg Oral Nightly    atorvastatin  40 mg Oral Nightly    dilTIAZem  120 mg Oral Daily    latanoprost  1 drop Both Eyes Nightly    metoprolol tartrate  50 mg Oral Q12H     Continuous Infusions:   sodium chloride      sodium chloride         Lab Results:   CBC:   Recent Labs     05/13/25  0814 05/13/25  1406 05/14/25  0411   WBC  --   --  12.8*   HGB 8.1* 8.4* 8.5*   PLT  --   --  238     BMP:    Recent Labs

## 2025-05-14 NOTE — PROGRESS NOTES
[facility]  INPATIENT PHYSICAL THERAPY  EVALUATION  [facility] NEUROSCIENCES 4A - 4A-03/003-A    Discharge Recommendations: Subacute/Skilled Nursing Facility  Equipment Recommendations: No (defer to next level of care)             Time In: 746  Time Out: 836  Timed Code Treatment Minutes: 41 Minutes  Minutes: 50          Date: 2025  Patient Name: Roberto Prescott,  Gender:  male        MRN: [id number]  : 1935  (89 y.o.)      Referring Practitioner: Ramin Asif MD  Diagnosis: Anemia  Additional Pertinent Hx: Per EMR \"This is a very pleasant 89 y.o. male who was admitted to the hospital with a chief complaints of shortness of breath. He lives with son. He has hx of CHF atrial fibrillation on xarelto was found to have severe anemia. He denies of vomiting of blood. Has been having gas. Reports of dark stool. He has shortness of breath and gets short of breath on exertion. He had a fall over three wks ago and had broken his forearm. He has seen Dr Mcghee in the past. He is getting blood transfusion. Has seen Dr Gibson strickland. Has elevated troponin. He has OA Asthma CHF and COPD\" Pt had an EDG.     Restrictions/Precautions:  Restrictions/Precautions: Fall Risk, Weight Bearing, Contact Precautions  Left Upper Extremity Weight Bearing: Non Weight Bearing    Other Position/Activity Restrictions: L forearm fx ~3 weeks ago-cast, [place], 2 L of O2 at baseline    Required Braces or Orthoses?: No      Subjective:  Chart Reviewed: Yes  Patient assessed for rehabilitation services?: Yes  Family/Caregiver Present: No  Subjective: Ok to see pt per nursing. Pt in bed when PT arrived, agreeable to PT session with min encouragement. Once completing tasks, pt talkative, cooperative/pleasant.    General:  Overall Orientation Status: Within Functional Limits  Orientation Level: Oriented X4  Vision: Impaired  Vision Exceptions: Wears glasses at all times  Hearing: Exceptions to WFL  Hearing Exceptions: Hard of

## 2025-05-14 NOTE — CARE COORDINATION
5/14/25, 10:16 AM EDT    DISCHARGE PLANNING EVALUATION    SW did speak with RN Netta, patient may be ready for discharge today but she has not spoke with provider yet. Did ask her to call JULIANN if they decide to discharge.

## 2025-05-14 NOTE — PLAN OF CARE
Problem: Chronic Conditions and Co-morbidities  Goal: Patient's chronic conditions and co-morbidity symptoms are monitored and maintained or improved  Outcome: Progressing  Flowsheets (Taken 5/14/2025 1325)  Care Plan - Patient's Chronic Conditions and Co-Morbidity Symptoms are Monitored and Maintained or Improved:   Monitor and assess patient's chronic conditions and comorbid symptoms for stability, deterioration, or improvement   Collaborate with multidisciplinary team to address chronic and comorbid conditions and prevent exacerbation or deterioration     Problem: Discharge Planning  Goal: Discharge to home or other facility with appropriate resources  Outcome: Progressing  Flowsheets (Taken 5/14/2025 1325)  Discharge to home or other facility with appropriate resources:   Arrange for needed discharge resources and transportation as appropriate   Identify barriers to discharge with patient and caregiver     Problem: Pain  Goal: Verbalizes/displays adequate comfort level or baseline comfort level  Outcome: Progressing  Flowsheets (Taken 5/14/2025 1325)  Verbalizes/displays adequate comfort level or baseline comfort level:   Encourage patient to monitor pain and request assistance   Assess pain using appropriate pain scale   Administer analgesics based on type and severity of pain and evaluate response   Implement non-pharmacological measures as appropriate and evaluate response     Problem: Skin/Tissue Integrity  Goal: Skin integrity remains intact  Description: 1.  Monitor for areas of redness and/or skin breakdown2.  Assess vascular access sites hourly3.  Every 4-6 hours minimum:  Change oxygen saturation probe site4.  Every 4-6 hours:  If on nasal continuous positive airway pressure, respiratory therapy assess nares and determine need for appliance change or resting period  Outcome: Progressing  Flowsheets (Taken 5/14/2025 1325)  Skin Integrity Remains Intact: Monitor for areas of redness and/or skin

## 2025-05-15 ENCOUNTER — APPOINTMENT (OUTPATIENT)
Dept: GENERAL RADIOLOGY | Age: 89
End: 2025-05-15
Payer: MEDICARE

## 2025-05-15 LAB
ANION GAP SERPL CALC-SCNC: 9 MEQ/L (ref 8–16)
BUN SERPL-MCNC: 12 MG/DL (ref 8–23)
CALCIUM SERPL-MCNC: 8.4 MG/DL (ref 8.8–10.2)
CHLORIDE SERPL-SCNC: 97 MEQ/L (ref 98–111)
CO2 SERPL-SCNC: 21 MEQ/L (ref 22–29)
CREAT SERPL-MCNC: 0.8 MG/DL (ref 0.7–1.2)
CREAT UR-MCNC: 39.8 MG/DL
DEPRECATED RDW RBC AUTO: 53.2 FL (ref 35–45)
EKG Q-T INTERVAL: 406 MS
EKG QRS DURATION: 94 MS
EKG QTC CALCULATION (BAZETT): 418 MS
EKG R AXIS: 59 DEGREES
EKG T AXIS: 60 DEGREES
EKG VENTRICULAR RATE: 64 BPM
ERYTHROCYTE [DISTWIDTH] IN BLOOD BY AUTOMATED COUNT: 16.6 % (ref 11.5–14.5)
GFR SERPL CREATININE-BSD FRML MDRD: 85 ML/MIN/1.73M2
GLUCOSE SERPL-MCNC: 98 MG/DL (ref 74–109)
HCT VFR BLD AUTO: 23.9 % (ref 42–52)
HGB BLD-MCNC: 7.9 GM/DL (ref 14–18)
MCH RBC QN AUTO: 29.9 PG (ref 26–33)
MCHC RBC AUTO-ENTMCNC: 33.1 GM/DL (ref 32.2–35.5)
MCV RBC AUTO: 90.5 FL (ref 80–94)
OSMOLALITY SERPL: 264 MOSMOL/KG (ref 275–295)
OSMOLALITY UR: 257 MOSMOL/KG (ref 250–750)
PLATELET # BLD AUTO: 221 THOU/MM3 (ref 130–400)
PMV BLD AUTO: 10.6 FL (ref 9.4–12.4)
POTASSIUM SERPL-SCNC: 4.2 MEQ/L (ref 3.5–5.2)
RBC # BLD AUTO: 2.64 MILL/MM3 (ref 4.7–6.1)
SODIUM SERPL-SCNC: 127 MEQ/L (ref 135–145)
SODIUM UR-SCNC: 49 MEQ/L
TSH SERPL DL<=0.05 MIU/L-ACNC: 1.53 UIU/ML (ref 0.27–4.2)
WBC # BLD AUTO: 11.7 THOU/MM3 (ref 4.8–10.8)

## 2025-05-15 PROCEDURE — 73100 X-RAY EXAM OF WRIST: CPT

## 2025-05-15 PROCEDURE — 6370000000 HC RX 637 (ALT 250 FOR IP): Performed by: INTERNAL MEDICINE

## 2025-05-15 PROCEDURE — 83935 ASSAY OF URINE OSMOLALITY: CPT

## 2025-05-15 PROCEDURE — 2700000000 HC OXYGEN THERAPY PER DAY

## 2025-05-15 PROCEDURE — 84443 ASSAY THYROID STIM HORMONE: CPT

## 2025-05-15 PROCEDURE — 80048 BASIC METABOLIC PNL TOTAL CA: CPT

## 2025-05-15 PROCEDURE — 97535 SELF CARE MNGMENT TRAINING: CPT

## 2025-05-15 PROCEDURE — 6360000002 HC RX W HCPCS: Performed by: INTERNAL MEDICINE

## 2025-05-15 PROCEDURE — 94761 N-INVAS EAR/PLS OXIMETRY MLT: CPT

## 2025-05-15 PROCEDURE — 97530 THERAPEUTIC ACTIVITIES: CPT

## 2025-05-15 PROCEDURE — 84300 ASSAY OF URINE SODIUM: CPT

## 2025-05-15 PROCEDURE — 1200000000 HC SEMI PRIVATE

## 2025-05-15 PROCEDURE — 85027 COMPLETE CBC AUTOMATED: CPT

## 2025-05-15 PROCEDURE — 94640 AIRWAY INHALATION TREATMENT: CPT

## 2025-05-15 PROCEDURE — 93005 ELECTROCARDIOGRAM TRACING: CPT | Performed by: INTERNAL MEDICINE

## 2025-05-15 PROCEDURE — 83930 ASSAY OF BLOOD OSMOLALITY: CPT

## 2025-05-15 PROCEDURE — 93010 ELECTROCARDIOGRAM REPORT: CPT | Performed by: INTERNAL MEDICINE

## 2025-05-15 PROCEDURE — 36415 COLL VENOUS BLD VENIPUNCTURE: CPT

## 2025-05-15 PROCEDURE — 82570 ASSAY OF URINE CREATININE: CPT

## 2025-05-15 RX ORDER — SODIUM CHLORIDE 1 G/1
1 TABLET ORAL
Status: DISCONTINUED | OUTPATIENT
Start: 2025-05-15 | End: 2025-05-16 | Stop reason: HOSPADM

## 2025-05-15 RX ORDER — ALBUTEROL SULFATE 0.83 MG/ML
2.5 SOLUTION RESPIRATORY (INHALATION)
Status: DISCONTINUED | OUTPATIENT
Start: 2025-05-15 | End: 2025-05-16 | Stop reason: HOSPADM

## 2025-05-15 RX ADMIN — DILTIAZEM HYDROCHLORIDE 120 MG: 120 CAPSULE, EXTENDED RELEASE ORAL at 08:47

## 2025-05-15 RX ADMIN — PANTOPRAZOLE SODIUM 40 MG: 40 TABLET, DELAYED RELEASE ORAL at 16:26

## 2025-05-15 RX ADMIN — RIVAROXABAN 20 MG: 20 TABLET, FILM COATED ORAL at 17:00

## 2025-05-15 RX ADMIN — SODIUM CHLORIDE 1 G: 1 TABLET ORAL at 08:47

## 2025-05-15 RX ADMIN — ALBUTEROL SULFATE 2.5 MG: 2.5 SOLUTION RESPIRATORY (INHALATION) at 18:12

## 2025-05-15 RX ADMIN — SODIUM CHLORIDE 1 G: 1 TABLET ORAL at 16:26

## 2025-05-15 RX ADMIN — SODIUM CHLORIDE 1 G: 1 TABLET ORAL at 12:44

## 2025-05-15 RX ADMIN — SPIRONOLACTONE 25 MG: 25 TABLET ORAL at 08:47

## 2025-05-15 RX ADMIN — LATANOPROST 1 DROP: 50 SOLUTION OPHTHALMIC at 21:39

## 2025-05-15 RX ADMIN — METOPROLOL TARTRATE 50 MG: 50 TABLET, FILM COATED ORAL at 08:47

## 2025-05-15 RX ADMIN — ATORVASTATIN CALCIUM 40 MG: 40 TABLET, FILM COATED ORAL at 21:38

## 2025-05-15 RX ADMIN — ALBUTEROL SULFATE 2.5 MG: 2.5 SOLUTION RESPIRATORY (INHALATION) at 22:45

## 2025-05-15 RX ADMIN — PANTOPRAZOLE SODIUM 40 MG: 40 TABLET, DELAYED RELEASE ORAL at 06:51

## 2025-05-15 RX ADMIN — METOPROLOL TARTRATE 50 MG: 50 TABLET, FILM COATED ORAL at 21:38

## 2025-05-15 NOTE — PLAN OF CARE
Problem: Chronic Conditions and Co-morbidities  Goal: Patient's chronic conditions and co-morbidity symptoms are monitored and maintained or improved  Outcome: Progressing     Problem: Discharge Planning  Goal: Discharge to home or other facility with appropriate resources  Outcome: Progressing     Problem: Pain  Goal: Verbalizes/displays adequate comfort level or baseline comfort level  Outcome: Progressing     Problem: Skin/Tissue Integrity  Goal: Skin integrity remains intact  Description: 1.  Monitor for areas of redness and/or skin breakdown2.  Assess vascular access sites hourly3.  Every 4-6 hours minimum:  Change oxygen saturation probe site4.  Every 4-6 hours:  If on nasal continuous positive airway pressure, respiratory therapy assess nares and determine need for appliance change or resting period  Outcome: Progressing     Problem: Safety - Adult  Goal: Free from fall injury  Outcome: Progressing     Problem: ABCDS Injury Assessment  Goal: Absence of physical injury  Outcome: Progressing     Problem: Infection - Adult  Goal: Absence of infection at discharge  Outcome: Progressing     Problem: Hematologic - Adult  Goal: Maintains hematologic stability  Outcome: Progressing     Problem: Respiratory - Adult  Goal: Achieves optimal ventilation and oxygenation  Outcome: Progressing

## 2025-05-15 NOTE — PROGRESS NOTES
[facility]  [facility] NEUROSCIENCES 4A  Occupational Therapy  Daily Note    Discharge Recommendations: Subacute/skilled nursing facility  Equipment Recommendations: No defer to next level of care      Time In: 1300  Time Out: 1324  Timed Code Treatment Minutes: 24 Minutes  Minutes: 24          Date: 5/15/2025  Patient Name: Roberto Prescott,   Gender: male      Room: [facility]03/003-A  MRN: [id number]  : 1935  (89 y.o.)  Referring Practitioner: Ramin Asif MD  Diagnosis: Anemia  Additional Pertinent Hx: Per H&P: his is a very pleasant 89 y.o. male who was admitted to the hospital with a chief complaints of shortness of breath. He lives with son. He has hx of CHF atrial fibrillation on xarelto was found to have severe anemia. He denies of vomiting of blood. Has been having gas. Reports of dark stool. He has shortness of breath and gets short of breath on exertion. He had a fall over three wks ago and had broken his forearm. Pt found to have acute GI bleed s/p PRBC. 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 treated with argon plasma coagulator and application of resolution clip with control of bleeding on 25    Restrictions/Precautions:  Restrictions/Precautions: Fall Risk, Weight Bearing, Contact Precautions  Left Upper Extremity Weight Bearing: Non Weight Bearing  Position Activity Restriction  Other Position/Activity Restrictions: L forearm fx ~3 weeks ago-cast, [place], 2 L of O2 at baseline     Social/Functional History:  Lives With: Other (Comment)  Type of Home: Facility ([facility])  Home Equipment: Electric scooter, Roll About           Prior Level of Assist for ADLs: Needs assistance  Prior Level of Assist for Transfers: Needs assistance  Prior Level of Assist for Ambulation: Needs assistance (uses rollator and has 1-2 staff assist with mobility)  Has the patient had two or more falls in the past year or any fall with injury in the past year?: Yes    Active : No

## 2025-05-15 NOTE — PROCEDURES
PROCEDURE NOTE  Date: 5/15/2025   Name: Roberto GUAJARDO Kenyon  YOB: 1935    Procedures  EKG completed

## 2025-05-15 NOTE — CARE COORDINATION
5/15/25, 1:28 PM EDT    DISCHARGE ON GOING EVALUATION    [facility] day: 5  Location: -03/003-A Reason for admit: Anemia [D64.9]  Symptomatic anemia [D64.9]  GI bleed [K92.2]     Procedures:   5/10 ECHO EF 55-60%     5/12 EGD    Imaging since last note: none    Barriers to Discharge: Orthopedics consult for s/p closed reduction of left arm. PT/OT, Na 127, nebs, Zofran prn, Protonix.    PCP: Karly Hoffman MD  Readmission Risk Score: 18.7    Patient Goals/Plan/Treatment Preferences: Plan is to return to [facility]. SW following

## 2025-05-15 NOTE — CONSULTS
Orthopaedic Consult  Patient:  Roberto Prescott  YOB: 1935  MRN: [id number]     Acct: [id number]    PCP: Karly Hoffman MD  Date of Admission: 5/10/2025  Date of Service: Pt seen/examined on 5/15/2025     Chief Complaint: LUE swelling  History Of Present Illness: 89 y.o. male who presented to [facility] with melena/GI bleed on 5/10/25. Noted some progressive erythema to the LUE just proximal to the cast prompting orthopaedic evaluation. Patient sustained a L distal radius fracture on 3/30/25, he was seen in office at O 3/31/25 where closed treatment was recommended with serial casting, repeat imaging revealed no displacement in fracture at most recent visit on 4/21/25. From LUE standpoint he is doing well. His pain is well controlled, he has been compliant in NWB LUE status and platform walker use.  Some discomfort at the L elbow from splint rubbing but no msk concerns otherwise.     Past Medical History:        Diagnosis Date    Arthritis     Asthma     Atrial fibrillation (HCC)     CHF (congestive heart failure) (HCC)     COPD (chronic obstructive pulmonary disease) (HCC)     Encephalopathy acute 11/20/2013    Hyperlipidemia     Hypertension     Muscular deconditioning 12/23/2013    Pneumonia        Past Surgical History:        Procedure Laterality Date    ABDOMEN SURGERY      ABDOMEN SURGERY N/A 7/11/2021    LEFT ABDOMEN WALL WOUND DEBRIDEMENT AND REPAIR performed by Shaka Armendariz MD at [facility] OR    EKG 12-LEAD  8/5/2015         FRACTURE SURGERY  unsure    HERNIA REPAIR  2013    [facility]    OTHER SURGICAL HISTORY  10-30-13    abdominal exploration, closure of evisceration with retention sutures-Dr. Calabrese     OTHER SURGICAL HISTORY  10-25-13    Placement of gastrostomy tube-Dr. Calabrese     UPPER GASTROINTESTINAL ENDOSCOPY N/A 9/30/2024    ESOPHAGOGASTRODUODENOSCOPY CONTROL HEMORRHAGE performed by Danny Mcghee MD at [facility] ENDOSCOPY    UPPER GASTROINTESTINAL ENDOSCOPY N/A 5/12/2025     Zheng Aviles MD   albuterol sulfate HFA (PROAIR HFA) 108 (90 BASE) MCG/ACT inhaler INHALE 2 PUFFS BY MOUTH EVERY 4 HOURS AS NEEDED 8/30/16   Michelle Colmenares APRN - CNP   LANOXIN 125 MCG tablet Take 1 tablet by mouth daily 8/30/16   Michelle Colmenares APRN - CNP   fluticasone (FLONASE) 50 MCG/ACT nasal spray INSTILL 1 SPRAY INTRANASALLY DAILY  Patient taking differently: 1 spray daily 7/26/16   Zheng Aviles MD   clotrimazole (LOTRIMIN) 1 % cream APPLY TOPICALLY AS DIRECTED  Patient taking differently: Apply 1 applicator topically 2 times daily APPLY TOPICALLY AS DIRECTED 7/20/16   Zheng Aviles MD   spironolactone (ALDACTONE) 25 MG tablet TAKE 1 TABLET BY MOUTH ONCE DAILY 4/19/16   Zheng Aviles MD   SYMBICORT 160-4.5 MCG/ACT AERO INHALE 2 (TWO) PUFFS BY MOUTH TWICE DAILY 3/1/16   Cesar Flores MD   diltiazem (CARDIZEM CD) 120 MG ER capsule Take 1 capsule by mouth daily 1/19/16   Zheng Aviles MD   Lift Chair MISC by Does not apply route 1/11/16   Zheng Aviles MD   AMITIZA 24 MCG capsule TAKE ONE (1) CAPSULE BY MOUTH ONCE DAILY WITH FOOD 1/7/16   Zheng Aviles MD   Multiple Vitamins-Minerals (CERTAVITE SENIOR/ANTIOXIDANT PO) Take by mouth    Jose Marquez MD   polyvinyl alcohol (LIQUIFILM TEARS) 1.4 % ophthalmic solution 1 drop as needed    Jose Marquez MD   latanoprost (XALATAN) 0.005 % ophthalmic solution 1 drop nightly    Jose Marquez MD   magnesium hydroxide (MILK OF MAGNESIA CONCENTRATE) 2400 MG/10ML SUSP Take 10 mLs by mouth once as needed    Jose Marquez MD   acetaminophen 650 MG TABS Take 650 mg by mouth every 4 hours as needed 7/5/15   Ramin Asif MD   albuterol (PROVENTIL) (2.5 MG/3ML) 0.083% nebulizer solution Take 3 mLs by nebulization every 6 hours as needed for Wheezing 7/5/15   Ramin Asif MD   guaiFENesin (ROBITUSSIN) 100 MG/5ML syrup  negative  Musculoskeletal ROS: See HPI  Neurological ROS: negative for - bowel and bladder control changes, gait disturbance or numbness/tingling  All other systems reviewed and are negative      PHYSICAL EXAM:  BP (!) 142/61   Pulse 63   Temp 97.8 °F (36.6 °C) (Oral)   Resp 20   Ht 1.854 m (6' 1\")   Wt 110.9 kg (244 lb 7.8 oz)   SpO2 99%   BMI 32.26 kg/m²   GENERAL APPEARANCE: Awake and oriented x4. No acute distress, except appropriate to injury.  MOOD AND AFFECT: Calm appropriate to situation  HEAD: normocephalic, atraumatic   EYES: equal and reactive to light, Extraocular movements are normal. Pupils are equal in size.  Hematologic/Lymphatic: no lymphadenopathy to upper or lower extremity.   MSK  LUE: atraumatic shoulder humerus, antecubital fossa  erythema and and induration, no poen wounds, swelling here, discrete, no fluctuance, short arm cast removed, no skin breakdown through remainder of distal extremity, painless shoulder ROM, elbow ROM stable and painless active and passive, wrist digits stable as well, some tenderness at distal radius however anticipated, digital ROM intact, SILT, hand warm well perfused.     Labs:   CBC:   Lab Results   Component Value Date/Time    WBC 11.7 05/15/2025 03:41 AM    RBC 2.64 05/15/2025 03:41 AM     BMP:  Lab Results   Component Value Date/Time    GLUCOSE 98 05/15/2025 03:41 AM    CO2 21 05/15/2025 03:41 AM    BUN 12 05/15/2025 03:41 AM    CREATININE 0.8 05/15/2025 03:41 AM    CALCIUM 8.4 05/15/2025 03:41 AM     PT/INR:   Lab Results   Component Value Date/Time    INR 2.80 05/10/2025 03:50 AM     Type and Screen:   Lab Results   Component Value Date/Time    LABANTI NEG 05/10/2025 05:19 AM     CRP:   Lab Results   Component Value Date/Time    CRP 3.52 03/08/2022 04:47 AM     ESR:   Lab Results   Component Value Date/Time    SEDRATE 70 03/08/2022 04:47 AM     HgBA1c:  No results found for: \"LABA1C\"    Radiology report reviewed.    ASSESSMENT:  89 y.o. male with left

## 2025-05-15 NOTE — PROGRESS NOTES
INTERNAL MEDICINE Progress Note  5/15/2025 2:53 PM  Subjective:   Admit Date: 5/10/2025  PCP: Karly Hoffman MD  Interval History:   89-year-old gentleman admitted with melena / GI bleed, required 2 units of packed red cell transfusion.  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  On ice chips p.o.  Patient denies abdominal pain, no dizziness.  No nausea, no vomiting.    5/13/25  No new c/o  No BM, no hematemesis  VSS, H/H stable    5/14/25  Tolerating regular meals.  No melena no hematochezia  Stable H&H.    5/15/25  BM, non bloody  Swollen left elbow, left wrist cast removed, X ray noted Fx distal radius    Objective:   Vitals: BP (!) 142/61   Pulse 63   Temp 97.8 °F (36.6 °C) (Oral)   Resp 20   Ht 1.854 m (6' 1\")   Wt 110.9 kg (244 lb 7.8 oz)   SpO2 99%   BMI 32.26 kg/m²   General appearance: alert and cooperative with exam  HEENT:  atraumatic  Neck: no adenopathy, no carotid bruit, and no JVD  Lungs: clear to auscultation bilaterally  Heart: regular rate and rhythm, S1, S2 normal, no murmur, click, rub or gallop  Abdomen: normal findings: bowel sounds normal, no masses palpable, soft, non-tender, and symmetric  Extremities: no edema, redness or tenderness in the calves or thighs, splinted left wrist, edema / erythema left elbow  Neurologic:Alert, oriented, thought content appropriate      Medications:   Scheduled Meds:   sodium chloride  1 g Oral TID WC    pantoprazole  40 mg Oral BID AC    spironolactone  25 mg Oral Daily    [Held by provider] digoxin  125 mcg Oral Daily    [Held by provider] rivaroxaban  20 mg Oral Daily with breakfast    [Held by provider] traZODone  50 mg Oral Nightly    atorvastatin  40 mg Oral Nightly    dilTIAZem  120 mg Oral Daily    latanoprost  1 drop Both Eyes Nightly    metoprolol tartrate  50 mg Oral Q12H     Continuous Infusions:        Lab Results:   CBC:  WRIST LEFT (2 VIEWS)    CLINICAL INFORMATION: distal radius fx, ok to shoot in splint.    COMPARISON: No prior study.    TECHNIQUE: 2 views of the left wrist    FINDINGS:    There is a fracture in the distal radius. The fracture line extends to the radiocarpal joint space. There is a questionable fracture in the mid pole of the navicular bone. There are degenerative changes especially at the first carpometacarpal joint.  There is diffuse osteopenia.  There is vascular calcification.   Impression:       1. Fracture in the distal radius.  2. Questionable lucency in the mid pole of the navicular bone. CT scan would be  helpful for better evaluation.  3. Diffuse osteopenia.  4. Degenerative changes.  5. Vascular calcification..     EKG 12 Lead [[id number]]    Collected: 05/15/25 0805    Updated: 05/15/25 0935    Order Status: Completed     Ventricular Rate 64 BPM    QRS Duration 94 ms    Q-T Interval 406 ms    QTc Calculation (Bazett) 418 ms    R Axis 59 degrees    T Axis 60 degrees   Narrative:     Atrial fibrillation with frequent ventricular-paced complexes  ST & T wave abnormality, consider lateral ischemia  Abnormal ECG     Creatinine, Random Urine [[id number]]    Collected: 05/15/25 0845    Updated: 05/15/25 0918    Order Status: Completed    Specimen Type: Urine     Creatinine, Ur 39.8 mg/dl    Comment: Performed at [facility] [address] [place] [id number]      Osmolality, urine [[id number]]    Collected: 05/15/25 0845    Updated: 05/15/25 0910    Order Status: Completed    Specimen Type: Urine     Osmolality, Ur 257 mosmol/kg    Comment: Performed at [facility] [address] [place] [id number]      Sodium, urine, random [[id number]]    Collected: 05/15/25 0845    Updated: 05/15/25 0904    Order Status: Completed    Specimen Type: Urine     Sodium, Ur 49      Osmolality, Serum [[id number]] (Abnormal)    Collected: 05/15/25 0341    Updated: 05/15/25 0805    Order Status: Completed

## 2025-05-16 VITALS
HEART RATE: 62 BPM | OXYGEN SATURATION: 100 % | BODY MASS INDEX: 32.4 KG/M2 | TEMPERATURE: 98.6 F | SYSTOLIC BLOOD PRESSURE: 135 MMHG | DIASTOLIC BLOOD PRESSURE: 74 MMHG | HEIGHT: 73 IN | RESPIRATION RATE: 18 BRPM | WEIGHT: 244.49 LBS

## 2025-05-16 LAB
ANION GAP SERPL CALC-SCNC: 10 MEQ/L (ref 8–16)
BUN SERPL-MCNC: 9 MG/DL (ref 8–23)
CALCIUM SERPL-MCNC: 8.2 MG/DL (ref 8.8–10.2)
CHLORIDE SERPL-SCNC: 96 MEQ/L (ref 98–111)
CO2 SERPL-SCNC: 21 MEQ/L (ref 22–29)
CREAT SERPL-MCNC: 0.8 MG/DL (ref 0.7–1.2)
DEPRECATED RDW RBC AUTO: 54.8 FL (ref 35–45)
ERYTHROCYTE [DISTWIDTH] IN BLOOD BY AUTOMATED COUNT: 16.3 % (ref 11.5–14.5)
GFR SERPL CREATININE-BSD FRML MDRD: 85 ML/MIN/1.73M2
GLUCOSE SERPL-MCNC: 100 MG/DL (ref 74–109)
HCT VFR BLD AUTO: 24.9 % (ref 42–52)
HGB BLD-MCNC: 8.1 GM/DL (ref 14–18)
MCH RBC QN AUTO: 29.9 PG (ref 26–33)
MCHC RBC AUTO-ENTMCNC: 32.5 GM/DL (ref 32.2–35.5)
MCV RBC AUTO: 91.9 FL (ref 80–94)
PLATELET # BLD AUTO: 248 THOU/MM3 (ref 130–400)
PMV BLD AUTO: 10.6 FL (ref 9.4–12.4)
POTASSIUM SERPL-SCNC: 4.6 MEQ/L (ref 3.5–5.2)
RBC # BLD AUTO: 2.71 MILL/MM3 (ref 4.7–6.1)
SODIUM SERPL-SCNC: 127 MEQ/L (ref 135–145)
WBC # BLD AUTO: 11.9 THOU/MM3 (ref 4.8–10.8)

## 2025-05-16 PROCEDURE — 36415 COLL VENOUS BLD VENIPUNCTURE: CPT

## 2025-05-16 PROCEDURE — 94761 N-INVAS EAR/PLS OXIMETRY MLT: CPT

## 2025-05-16 PROCEDURE — 6370000000 HC RX 637 (ALT 250 FOR IP): Performed by: INTERNAL MEDICINE

## 2025-05-16 PROCEDURE — 2700000000 HC OXYGEN THERAPY PER DAY

## 2025-05-16 PROCEDURE — 80048 BASIC METABOLIC PNL TOTAL CA: CPT

## 2025-05-16 PROCEDURE — 6360000002 HC RX W HCPCS: Performed by: INTERNAL MEDICINE

## 2025-05-16 PROCEDURE — 94640 AIRWAY INHALATION TREATMENT: CPT

## 2025-05-16 PROCEDURE — 85027 COMPLETE CBC AUTOMATED: CPT

## 2025-05-16 RX ADMIN — ALBUTEROL SULFATE 2.5 MG: 2.5 SOLUTION RESPIRATORY (INHALATION) at 06:36

## 2025-05-16 RX ADMIN — DILTIAZEM HYDROCHLORIDE 120 MG: 120 CAPSULE, EXTENDED RELEASE ORAL at 08:01

## 2025-05-16 RX ADMIN — SPIRONOLACTONE 25 MG: 25 TABLET ORAL at 08:01

## 2025-05-16 RX ADMIN — SODIUM CHLORIDE 1 G: 1 TABLET ORAL at 12:00

## 2025-05-16 RX ADMIN — SODIUM CHLORIDE 1 G: 1 TABLET ORAL at 08:01

## 2025-05-16 RX ADMIN — METOPROLOL TARTRATE 50 MG: 50 TABLET, FILM COATED ORAL at 08:01

## 2025-05-16 RX ADMIN — ACETAMINOPHEN 650 MG: 325 TABLET ORAL at 00:24

## 2025-05-16 RX ADMIN — PANTOPRAZOLE SODIUM 40 MG: 40 TABLET, DELAYED RELEASE ORAL at 08:01

## 2025-05-16 ASSESSMENT — PAIN DESCRIPTION - ORIENTATION: ORIENTATION: LEFT

## 2025-05-16 ASSESSMENT — PAIN DESCRIPTION - LOCATION: LOCATION: ARM

## 2025-05-16 ASSESSMENT — PAIN SCALES - GENERAL
PAINLEVEL_OUTOF10: 0
PAINLEVEL_OUTOF10: 2
PAINLEVEL_OUTOF10: 0

## 2025-05-16 NOTE — DISCHARGE SUMMARY
Discharge Summary    Roberto Prescott  :  1935  MRN:  [id number]    Admit date:  5/10/2025  Discharge date:      Admitting Physician:  Ramin Asif MD    Discharge Diagnoses:            Upper GI bleed  Acute blood loss anemia status post postop transfusion x 2 units  Gastric AVM, status post EGD/APC treatment; 2025  DIANNE/azotemia related to above.  Hyponatremia  Chronic atrial fibrillation, on Xarelto, Xarelto on hold  Chronic systolic congestive heart failure complaints stated.  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     fluticasone 50 MCG/ACT nasal spray  Commonly known as: FLONASE  INSTILL 1 SPRAY INTRANASALLY DAILY  What changed: See the new instructions.     traZODone 150 MG tablet  Commonly known as: DESYREL  What changed: Another medication with the same name was removed. Continue taking this medication, and follow the directions you see here.            CONTINUE taking these medications      Acetaminophen 650 MG Tabs     * albuterol (2.5 MG/3ML) 0.083% nebulizer solution  Commonly known as: PROVENTIL  Take 3 mLs by nebulization every 6 hours as needed for Wheezing     * albuterol sulfate  (90 Base) MCG/ACT inhaler  Commonly known as: ProAir HFA  INHALE 2 PUFFS BY MOUTH EVERY 4 HOURS AS NEEDED     Amitiza 24 MCG capsule  Generic drug: lubiprostone  TAKE ONE (1) CAPSULE BY MOUTH ONCE DAILY WITH FOOD

## 2025-05-16 NOTE — PROGRESS NOTES
Called and gave report to Nurse at [facility]. Discharge teaching and instructions for diagnosis/procedure of Anemia completed with nurse. AVS reviewed. Discharged in a wheelchair to  skilled nursing per EMS transportation.

## 2025-05-16 NOTE — CARE COORDINATION
5/16/25, 11:23 AM EDT    Patient goals/plan/ treatment preferences discussed by  and .  Patient goals/plan/ treatment preferences reviewed with patient/ family.  Patient/ family verbalize understanding of discharge plan and are in agreement with goal/plan/treatment preferences.  Understanding was demonstrated using the teach back method.  AVS provided by RN at time of discharge, which includes all necessary medical information pertaining to the patients current course of illness, treatment, post-discharge goals of care, and treatment preferences.     Services At/After Discharge: Skilled Nursing Facility (SNF), Aide services, In ambulette, Nursing service, OT, and PT       Roberto will be discharged to [facility] home today.  He will return under his Medicare benefit.  He will be transported by ambulette.  Discharge instructions are attached to blue discharge packet.  Spoke with patient and called his son Juan to make them aware of discharge.  Called Julio C at the facility and they are ready to accept back.

## 2025-05-16 NOTE — PLAN OF CARE
Repeat imaging stable  New cast applied LUE, NVI post application   Platform wb LUE, otherwise NWB LUE  Follow up 2 weeks Dr Arriaza outpatient    Seema Mcdonald PA-C

## 2025-05-16 NOTE — PROGRESS NOTES
Physician Progress Note      PATIENT:               GARY KHAN  CSN #:                  [id number]  :                       1935  ADMIT DATE:       5/10/2025 3:48 AM  DISCH DATE:  RESPONDING  PROVIDER #:        Ramin Asif MD          QUERY TEXT:    Per H&P and subsequent documentation Patient with \" hx of CHF atrial   fibrillation on xarelto \"  Based on your medical judgment, please clarify these findings and document if   any of the following are being evaluated and/or treated:    The clinical indicators include:  Risk : known chronic Afib, htn , chf, copd, Hx of SSS, pacemaker, NSTEMI    Clinical indicators : per H&P  hx of CHF atrial fibrillation on xarelto, PMH    Treatment : Fall precautions, bleeding and clotting precautions, serial labs,   telemetry, EKG, Cardiology Consult , attention and treatment for Anemia from   comorbid condition    Thank you,  Hiram RAY, RN, CRCR  Clinical   P: [phone]  Options provided:  -- Secondary hypercoagulable state in a patient with atrial fibrillation  -- Other - I will add my own diagnosis  -- Disagree - Not applicable / Not valid  -- Disagree - Clinically unable to determine / Unknown  -- Refer to Clinical Documentation Reviewer    PROVIDER RESPONSE TEXT:    This patient has secondary hypercoagulable state in a patient with atrial   fibrillation.    Query created by: Hiram Chatterjee on 2025 11:05 AM      Electronically signed by:  Ramin Asif MD 2025 12:24 PM

## 2025-05-16 NOTE — PLAN OF CARE
Problem: Chronic Conditions and Co-morbidities  Goal: Patient's chronic conditions and co-morbidity symptoms are monitored and maintained or improved  Outcome: Adequate for Discharge  Flowsheets (Taken 5/16/2025 1201)  Care Plan - Patient's Chronic Conditions and Co-Morbidity Symptoms are Monitored and Maintained or Improved:   Monitor and assess patient's chronic conditions and comorbid symptoms for stability, deterioration, or improvement   Collaborate with multidisciplinary team to address chronic and comorbid conditions and prevent exacerbation or deterioration   Update acute care plan with appropriate goals if chronic or comorbid symptoms are exacerbated and prevent overall improvement and discharge     Problem: Discharge Planning  Goal: Discharge to home or other facility with appropriate resources  Outcome: Adequate for Discharge  Flowsheets (Taken 5/16/2025 1201)  Discharge to home or other facility with appropriate resources:   Identify barriers to discharge with patient and caregiver   Identify discharge learning needs (meds, wound care, etc)   Refer to discharge planning if patient needs post-hospital services based on physician order or complex needs related to functional status, cognitive ability or social support system   Arrange for interpreters to assist at discharge as needed     Problem: Pain  Goal: Verbalizes/displays adequate comfort level or baseline comfort level  Outcome: Adequate for Discharge  Flowsheets (Taken 5/16/2025 1201)  Verbalizes/displays adequate comfort level or baseline comfort level:   Encourage patient to monitor pain and request assistance   Administer analgesics based on type and severity of pain and evaluate response   Consider cultural and social influences on pain and pain management   Assess pain using appropriate pain scale   Implement non-pharmacological measures as appropriate and evaluate response   Notify Licensed Independent Practitioner if interventions

## 2025-05-16 NOTE — PROGRESS NOTES
INTERNAL MEDICINE Progress Note  5/16/2025 11:52 AM  Subjective:   Admit Date: 5/10/2025  PCP: Karly Hoffman MD  Interval History:   89-year-old gentleman admitted with melena / GI bleed, required 2 units of packed red cell transfusion.  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  On ice chips p.o.  Patient denies abdominal pain, no dizziness.  No nausea, no vomiting.    5/13/25  No new c/o  No BM, no hematemesis  VSS, H/H stable    5/14/25  Tolerating regular meals.  No melena no hematochezia  Stable H&H.    5/15/25  BM, non bloody  Swollen left elbow, left wrist cast removed, X ray noted Fx distal radius    5/16/25  New cast to left wrist this am  No new c/o    Objective:   Vitals: /74   Pulse 62   Temp 98.6 °F (37 °C) (Oral)   Resp 18   Ht 1.854 m (6' 1\")   Wt 110.9 kg (244 lb 7.8 oz)   SpO2 100%   BMI 32.26 kg/m²   General appearance: alert and cooperative with exam  HEENT:  atraumatic  Neck: no adenopathy, no carotid bruit, and no JVD  Lungs: clear to auscultation bilaterally  Heart: regular rate and rhythm, S1, S2 normal, no murmur, click, rub or gallop  Abdomen: normal findings: bowel sounds normal, no masses palpable, soft, non-tender, and symmetric  Extremities: no edema, redness or tenderness in the calves or thighs, cast to left wrist  Neurologic:Alert, oriented, thought content appropriate      Medications:   Scheduled Meds:   sodium chloride  1 g Oral TID WC    rivaroxaban  20 mg Oral Dinner    albuterol  2.5 mg Nebulization TID RT    pantoprazole  40 mg Oral BID AC    spironolactone  25 mg Oral Daily    [Held by provider] traZODone  50 mg Oral Nightly    atorvastatin  40 mg Oral Nightly    dilTIAZem  120 mg Oral Daily    latanoprost  1 drop Both Eyes Nightly    metoprolol tartrate  50 mg Oral Q12H     Continuous Infusions:        Lab Results:   CBC:   Recent Labs

## 2025-06-24 ENCOUNTER — CLINICAL SUPPORT (OUTPATIENT)
Dept: CARDIOLOGY CLINIC | Age: 89
End: 2025-06-24

## 2025-06-24 DIAGNOSIS — Z95.0 PACEMAKER: Primary | ICD-10-CM

## 2025-08-26 ENCOUNTER — CLINICAL SUPPORT (OUTPATIENT)
Dept: CARDIOLOGY CLINIC | Age: 89
End: 2025-08-26

## 2025-08-26 DIAGNOSIS — Z95.0 PACEMAKER: Primary | ICD-10-CM

## (undated) DEVICE — SPONGE LAP W18XL18IN WHT COT 4 PLY FLD STRUNG RADPQ DISP ST

## (undated) DEVICE — GLOVE ORANGE PI 7   MSG9070

## (undated) DEVICE — GLOVE ORTHO 8   MSG9480

## (undated) DEVICE — BREAST HERNIA PACK: Brand: MEDLINE INDUSTRIES, INC.

## (undated) DEVICE — GLOVE ORANGE PI 8   MSG9080

## (undated) DEVICE — PENCIL SMK EVAC ALL IN 1 DSGN ENH VISIBILITY IMPROVED AIR

## (undated) DEVICE — FIAPC® PROBE W/ FILTER 3000 A OD 2.3MM/6.9FR; L 3M/9.8FT: Brand: ERBE

## (undated) DEVICE — SPONGE GZ W4XL4IN COT 12 PLY TYP VII WVN C FLD DSGN